# Patient Record
Sex: MALE | Race: WHITE | NOT HISPANIC OR LATINO | Employment: PART TIME | URBAN - NONMETROPOLITAN AREA
[De-identification: names, ages, dates, MRNs, and addresses within clinical notes are randomized per-mention and may not be internally consistent; named-entity substitution may affect disease eponyms.]

---

## 2021-11-19 ENCOUNTER — OFFICE VISIT (OUTPATIENT)
Dept: FAMILY MEDICINE CLINIC | Facility: CLINIC | Age: 46
End: 2021-11-19

## 2021-11-19 VITALS
OXYGEN SATURATION: 98 % | HEIGHT: 72 IN | BODY MASS INDEX: 42.66 KG/M2 | DIASTOLIC BLOOD PRESSURE: 64 MMHG | SYSTOLIC BLOOD PRESSURE: 132 MMHG | HEART RATE: 85 BPM | RESPIRATION RATE: 22 BRPM | WEIGHT: 315 LBS

## 2021-11-19 DIAGNOSIS — E10.9 TYPE 1 DIABETES MELLITUS WITHOUT COMPLICATION (HCC): ICD-10-CM

## 2021-11-19 DIAGNOSIS — I10 PRIMARY HYPERTENSION: ICD-10-CM

## 2021-11-19 DIAGNOSIS — Z76.89 ENCOUNTER TO ESTABLISH CARE: Primary | ICD-10-CM

## 2021-11-19 PROCEDURE — 99203 OFFICE O/P NEW LOW 30 MIN: CPT | Performed by: NURSE PRACTITIONER

## 2021-11-19 RX ORDER — ASCORBIC ACID 500 MG
500 TABLET ORAL DAILY
COMMUNITY

## 2021-11-19 RX ORDER — PROCHLORPERAZINE 25 MG/1
1 SUPPOSITORY RECTAL ONCE AS NEEDED
Qty: 1 EACH | Refills: 0 | Status: SHIPPED | OUTPATIENT
Start: 2021-11-19

## 2021-11-19 RX ORDER — LISINOPRIL AND HYDROCHLOROTHIAZIDE 25; 20 MG/1; MG/1
1 TABLET ORAL DAILY
COMMUNITY
Start: 2021-10-15 | End: 2021-11-19 | Stop reason: SDUPTHER

## 2021-11-19 RX ORDER — INSULIN GLARGINE 100 [IU]/ML
INJECTION, SOLUTION SUBCUTANEOUS
COMMUNITY
End: 2021-11-19

## 2021-11-19 RX ORDER — PROCHLORPERAZINE 25 MG/1
SUPPOSITORY RECTAL
Qty: 3 EACH | Refills: 3 | Status: SHIPPED | OUTPATIENT
Start: 2021-11-19

## 2021-11-19 RX ORDER — LISINOPRIL AND HYDROCHLOROTHIAZIDE 25; 20 MG/1; MG/1
1 TABLET ORAL DAILY
Qty: 30 TABLET | Refills: 11 | Status: SHIPPED | OUTPATIENT
Start: 2021-11-19 | End: 2022-02-02 | Stop reason: SDUPTHER

## 2021-11-19 RX ORDER — PROCHLORPERAZINE 25 MG/1
1 SUPPOSITORY RECTAL
Qty: 1 EACH | Refills: 3 | Status: SHIPPED | OUTPATIENT
Start: 2021-11-19

## 2021-11-19 RX ORDER — ASPIRIN 81 MG/1
81 TABLET, CHEWABLE ORAL DAILY
COMMUNITY

## 2021-11-19 NOTE — PROGRESS NOTES
Chief Complaint  Establish Care    Subjective          Taylor Le presents to [facility] - [place] to establish care. He is a Type 1 diabetic, currently using Lantus two times a day, Novolog with meals. Currently using augustin to help monitor his blood sugar, this has helped him be more compliant with keeping blood sugars stable.           Diabetes  He presents for his initial diabetic visit. He has type 1 diabetes mellitus. His disease course has been fluctuating. Hypoglycemia symptoms include sweats. Pertinent negatives for hypoglycemia include no confusion or dizziness. Associated symptoms include visual change. Pertinent negatives for diabetes include no fatigue, no polyphagia and no polyuria. Hypoglycemia complications include nocturnal hypoglycemia. Symptoms are improving. Risk factors for coronary artery disease include male sex, obesity and family history. He is compliant with treatment most of the time. He is following a generally healthy diet. Eye exam is current.     Outpatient Medications Prior to Visit   Medication Sig Dispense Refill   • ascorbic acid (VITAMIN C) 500 MG tablet Take 500 mg by mouth Daily.     • aspirin 81 MG chewable tablet Chew 81 mg Daily.     • insulin aspart (novoLOG FLEXPEN) 100 UNIT/ML solution pen-injector sc pen INJECT APPROXIMATELY 50 UNITS DAILY BASED ON CARBOHYDRATES AND BLOOD GLUCOSE.     • insulin glargine (LANTUS, SEMGLEE) 100 UNIT/ML injection Inject  under the skin into the appropriate area as directed.     • lisinopril-hydrochlorothiazide (PRINZIDE,ZESTORETIC) 20-25 MG per tablet Take 1 tablet by mouth Daily.       No facility-administered medications prior to visit.       Review of Systems   Constitutional: Negative for activity change, appetite change, diaphoresis, fatigue and unexpected weight change.   HENT: Negative for congestion and ear pain.    Eyes: Negative for discharge and visual disturbance.   Respiratory: Negative  "for apnea, cough, choking, shortness of breath, wheezing and stridor.    Gastrointestinal: Negative for abdominal distention, constipation, diarrhea and nausea.   Endocrine: Negative for cold intolerance, heat intolerance, polyphagia and polyuria.   Genitourinary: Negative.  Negative for frequency.   Musculoskeletal: Negative for arthralgias, back pain and myalgias.   Skin: Negative for color change and wound.   Allergic/Immunologic: Negative.    Neurological: Negative for dizziness, syncope and light-headedness.   Psychiatric/Behavioral: Negative for agitation and confusion.         Objective   Vital Signs:   Visit Vitals  /64 (BP Location: Right arm, Patient Position: Sitting, Cuff Size: Large Adult)   Pulse 85   Resp 22   Ht 182.9 cm (72\")   Wt (!) 144 kg (316 lb 8 oz)   SpO2 98%   BMI 42.93 kg/m²     Physical Exam  Vitals and nursing note reviewed.   Constitutional:       Appearance: Normal appearance. He is well-developed.   HENT:      Head: Normocephalic and atraumatic.      Right Ear: Hearing normal.      Left Ear: Hearing normal.      Nose: Nose normal. No mucosal edema or rhinorrhea.   Eyes:      General: Lids are normal.      Conjunctiva/sclera: Conjunctivae normal.      Pupils: Pupils are equal, round, and reactive to light.   Neck:      Thyroid: No thyroid mass or thyromegaly.      Trachea: Trachea normal. No tracheal tenderness or tracheal deviation.   Cardiovascular:      Rate and Rhythm: Normal rate.      Pulses: Normal pulses.      Heart sounds: Normal heart sounds.   Pulmonary:      Effort: Pulmonary effort is normal. No respiratory distress.      Breath sounds: Normal breath sounds. No stridor. No wheezing or rales.   Abdominal:      Palpations: Abdomen is soft.   Musculoskeletal:         General: Normal range of motion.      Cervical back: Normal range of motion.   Lymphadenopathy:      Head:      Right side of head: No submental, submandibular or tonsillar adenopathy.      Left side of " head: No submental, submandibular or tonsillar adenopathy.      Cervical: No cervical adenopathy.   Skin:     General: Skin is warm and dry.   Neurological:      Mental Status: He is alert and oriented to person, place, and time.   Psychiatric:         Mood and Affect: Mood is not anxious. Affect is not inappropriate.         Speech: Speech normal.         Behavior: Behavior normal. Behavior is not agitated or hyperactive.         Thought Content: Thought content normal.         Judgment: Judgment normal.        Result Review :                 Assessment and Plan    Diagnoses and all orders for this visit:    1. Encounter to establish care (Primary)  -     TSH; Future  -     Hemoglobin A1c; Future  -     Comprehensive Metabolic Panel; Future  -     CBC & Differential; Future  -     Vitamin B12; Future  -     Lipid Panel; Future    2. Type 1 diabetes mellitus without complication (HCC)  -     Continuous Blood Gluc  (Dexcom G6 ) device; 1 each 1 (One) Time As Needed (one device a year) for up to 1 dose.  Dispense: 1 each; Refill: 0  -     Continuous Blood Gluc Transmit (Dexcom G6 Transmitter) misc; 1 each Every 3 (Three) Months.  Dispense: 1 each; Refill: 3  -     Continuous Blood Gluc Sensor (Dexcom G6 Sensor); Every 10 (Ten) Days.  Dispense: 3 each; Refill: 3  -     TSH; Future  -     Hemoglobin A1c; Future  -     Comprehensive Metabolic Panel; Future  -     CBC & Differential; Future  -     Vitamin B12; Future  -     Lipid Panel; Future  -     Insulin Glargine (LANTUS SOLOSTAR) 100 UNIT/ML injection pen; Inject 30 Units under the skin into the appropriate area as directed 2 (Two) Times a Day.  Dispense: 10 pen; Refill: 11  -     insulin aspart (novoLOG FLEXPEN) 100 UNIT/ML solution pen-injector sc pen; Give 20 units with each meal and as needed  Dispense: 5 pen; Refill: 11    3. Primary hypertension  -     lisinopril-hydrochlorothiazide (PRINZIDE,ZESTORETIC) 20-25 MG per tablet; Take 1 tablet by  mouth Daily.  Dispense: 30 tablet; Refill: 11      Continue current medications       He is currently taking Lantus 38 units in AM, and PM taking 28 units. He has been waking with low blood sugar, we will cut 2 units on his PM dose.     Novolog- 1unit per 10carb      Continue to monitor blood pressure at home      Complete ordered lab work   We will call with results       I spent 30 minutes caring for Taylor on this date of service. This time includes time spent by me in the following activities:preparing for the visit, performing a medically appropriate examination and/or evaluation , counseling and educating the patient/family/caregiver, ordering medications, tests, or procedures and documenting information in the medical record  Follow Up   Return in about 3 months (around 2/19/2022), or if symptoms worsen or fail to improve, for Recheck.  Patient was given instructions and counseling regarding his condition or for health maintenance advice. Please see specific information pulled into the AVS if appropriate.           This document has been electronically signed by TRISHA Parra on November 19, 2021 15:16 CST

## 2021-12-03 ENCOUNTER — TELEPHONE (OUTPATIENT)
Dept: FAMILY MEDICINE CLINIC | Facility: CLINIC | Age: 46
End: 2021-12-03

## 2021-12-03 NOTE — TELEPHONE ENCOUNTER
Taylor called and said he and his wife spoke to Rachel about writing a letter stating they are medically fit to adopt.  He wanted an update at [phone]

## 2021-12-22 ENCOUNTER — LAB (OUTPATIENT)
Dept: LAB | Facility: HOSPITAL | Age: 46
End: 2021-12-22

## 2021-12-22 DIAGNOSIS — E10.9 TYPE 1 DIABETES MELLITUS WITHOUT COMPLICATION (HCC): Primary | ICD-10-CM

## 2021-12-22 DIAGNOSIS — Z76.89 ENCOUNTER TO ESTABLISH CARE: ICD-10-CM

## 2021-12-22 DIAGNOSIS — E10.9 TYPE 1 DIABETES MELLITUS WITHOUT COMPLICATION (HCC): ICD-10-CM

## 2021-12-22 PROCEDURE — 83036 HEMOGLOBIN GLYCOSYLATED A1C: CPT

## 2021-12-22 PROCEDURE — 36415 COLL VENOUS BLD VENIPUNCTURE: CPT

## 2021-12-22 PROCEDURE — 82607 VITAMIN B-12: CPT

## 2021-12-22 PROCEDURE — 80053 COMPREHEN METABOLIC PANEL: CPT

## 2021-12-22 PROCEDURE — 80061 LIPID PANEL: CPT

## 2021-12-22 PROCEDURE — 85025 COMPLETE CBC W/AUTO DIFF WBC: CPT

## 2021-12-22 PROCEDURE — 84443 ASSAY THYROID STIM HORMONE: CPT

## 2021-12-22 RX ORDER — FLASH GLUCOSE SCANNING READER
1 EACH MISCELLANEOUS AS NEEDED
Qty: 1 EACH | Refills: 0 | Status: SHIPPED | OUTPATIENT
Start: 2021-12-22

## 2021-12-22 RX ORDER — FLASH GLUCOSE SENSOR
1 KIT MISCELLANEOUS
Qty: 3 EACH | Refills: 11 | Status: SHIPPED | OUTPATIENT
Start: 2021-12-22 | End: 2022-01-03 | Stop reason: SDUPTHER

## 2021-12-23 ENCOUNTER — TELEPHONE (OUTPATIENT)
Dept: FAMILY MEDICINE CLINIC | Facility: CLINIC | Age: 46
End: 2021-12-23

## 2021-12-23 LAB
ALBUMIN SERPL-MCNC: 4.1 G/DL (ref 3.5–5.2)
ALBUMIN/GLOB SERPL: 1.5 G/DL
ALP SERPL-CCNC: 91 U/L (ref 39–117)
ALT SERPL W P-5'-P-CCNC: 27 U/L (ref 1–41)
ANION GAP SERPL CALCULATED.3IONS-SCNC: 7.5 MMOL/L (ref 5–15)
AST SERPL-CCNC: 18 U/L (ref 1–40)
BASOPHILS # BLD AUTO: 0.08 10*3/MM3 (ref 0–0.2)
BASOPHILS NFR BLD AUTO: 1.1 % (ref 0–1.5)
BILIRUB SERPL-MCNC: 0.3 MG/DL (ref 0–1.2)
BUN SERPL-MCNC: 17 MG/DL (ref 6–20)
BUN/CREAT SERPL: 18.7 (ref 7–25)
CALCIUM SPEC-SCNC: 9.3 MG/DL (ref 8.6–10.5)
CHLORIDE SERPL-SCNC: 98 MMOL/L (ref 98–107)
CHOLEST SERPL-MCNC: 178 MG/DL (ref 0–200)
CO2 SERPL-SCNC: 29.5 MMOL/L (ref 22–29)
CREAT SERPL-MCNC: 0.91 MG/DL (ref 0.76–1.27)
DEPRECATED RDW RBC AUTO: 39.8 FL (ref 37–54)
EOSINOPHIL # BLD AUTO: 0.13 10*3/MM3 (ref 0–0.4)
EOSINOPHIL NFR BLD AUTO: 1.8 % (ref 0.3–6.2)
ERYTHROCYTE [DISTWIDTH] IN BLOOD BY AUTOMATED COUNT: 13 % (ref 12.3–15.4)
GFR SERPL CREATININE-BSD FRML MDRD: 91 ML/MIN/1.73
GLOBULIN UR ELPH-MCNC: 2.7 GM/DL
GLUCOSE SERPL-MCNC: 203 MG/DL (ref 65–99)
HBA1C MFR BLD: 9.75 % (ref 4.8–5.6)
HCT VFR BLD AUTO: 40 % (ref 37.5–51)
HDLC SERPL-MCNC: 41 MG/DL (ref 40–60)
HGB BLD-MCNC: 13.5 G/DL (ref 13–17.7)
IMM GRANULOCYTES # BLD AUTO: 0.02 10*3/MM3 (ref 0–0.05)
IMM GRANULOCYTES NFR BLD AUTO: 0.3 % (ref 0–0.5)
LDLC SERPL CALC-MCNC: 125 MG/DL (ref 0–100)
LDLC/HDLC SERPL: 3.03 {RATIO}
LYMPHOCYTES # BLD AUTO: 2.56 10*3/MM3 (ref 0.7–3.1)
LYMPHOCYTES NFR BLD AUTO: 35 % (ref 19.6–45.3)
MCH RBC QN AUTO: 28.4 PG (ref 26.6–33)
MCHC RBC AUTO-ENTMCNC: 33.8 G/DL (ref 31.5–35.7)
MCV RBC AUTO: 84.2 FL (ref 79–97)
MONOCYTES # BLD AUTO: 0.52 10*3/MM3 (ref 0.1–0.9)
MONOCYTES NFR BLD AUTO: 7.1 % (ref 5–12)
NEUTROPHILS NFR BLD AUTO: 4 10*3/MM3 (ref 1.7–7)
NEUTROPHILS NFR BLD AUTO: 54.7 % (ref 42.7–76)
NRBC BLD AUTO-RTO: 0.1 /100 WBC (ref 0–0.2)
PLATELET # BLD AUTO: 315 10*3/MM3 (ref 140–450)
PMV BLD AUTO: 10.5 FL (ref 6–12)
POTASSIUM SERPL-SCNC: 4.7 MMOL/L (ref 3.5–5.2)
PROT SERPL-MCNC: 6.8 G/DL (ref 6–8.5)
RBC # BLD AUTO: 4.75 10*6/MM3 (ref 4.14–5.8)
SODIUM SERPL-SCNC: 135 MMOL/L (ref 136–145)
TRIGL SERPL-MCNC: 63 MG/DL (ref 0–150)
TSH SERPL DL<=0.05 MIU/L-ACNC: 3.36 UIU/ML (ref 0.27–4.2)
VIT B12 BLD-MCNC: 545 PG/ML (ref 211–946)
VLDLC SERPL-MCNC: 12 MG/DL (ref 5–40)
WBC NRBC COR # BLD: 7.31 10*3/MM3 (ref 3.4–10.8)

## 2021-12-23 NOTE — TELEPHONE ENCOUNTER
Patient is requesting the prescription for the sensors to be refax to [facility]. [facility] saying that [facility] didn't receive the rx order.

## 2022-01-03 DIAGNOSIS — E10.9 TYPE 1 DIABETES MELLITUS WITHOUT COMPLICATION: Primary | ICD-10-CM

## 2022-01-03 RX ORDER — FLASH GLUCOSE SENSOR
1 KIT MISCELLANEOUS
Qty: 3 EACH | Refills: 11 | Status: SHIPPED | OUTPATIENT
Start: 2022-01-03

## 2022-01-11 DIAGNOSIS — E10.65 TYPE 1 DIABETES MELLITUS WITH HYPERGLYCEMIA: ICD-10-CM

## 2022-01-11 DIAGNOSIS — E10.9 TYPE 1 DIABETES MELLITUS WITHOUT COMPLICATION: Primary | ICD-10-CM

## 2022-02-02 DIAGNOSIS — I10 PRIMARY HYPERTENSION: ICD-10-CM

## 2022-02-02 RX ORDER — LISINOPRIL AND HYDROCHLOROTHIAZIDE 25; 20 MG/1; MG/1
1 TABLET ORAL DAILY
Qty: 30 TABLET | Refills: 11 | Status: SHIPPED | OUTPATIENT
Start: 2022-02-02 | End: 2023-01-30 | Stop reason: SDUPTHER

## 2022-03-21 ENCOUNTER — OFFICE VISIT (OUTPATIENT)
Dept: FAMILY MEDICINE CLINIC | Facility: CLINIC | Age: 47
End: 2022-03-21

## 2022-03-21 VITALS
HEIGHT: 72 IN | OXYGEN SATURATION: 99 % | WEIGHT: 315 LBS | SYSTOLIC BLOOD PRESSURE: 144 MMHG | DIASTOLIC BLOOD PRESSURE: 80 MMHG | BODY MASS INDEX: 42.66 KG/M2 | RESPIRATION RATE: 24 BRPM | HEART RATE: 67 BPM

## 2022-03-21 DIAGNOSIS — R60.9 EDEMA, UNSPECIFIED TYPE: Primary | ICD-10-CM

## 2022-03-21 PROCEDURE — 99213 OFFICE O/P EST LOW 20 MIN: CPT | Performed by: NURSE PRACTITIONER

## 2022-03-21 RX ORDER — FUROSEMIDE 20 MG/1
20 TABLET ORAL 2 TIMES DAILY
Qty: 60 TABLET | Refills: 3 | Status: SHIPPED | OUTPATIENT
Start: 2022-03-21 | End: 2022-08-30 | Stop reason: SDUPTHER

## 2022-03-21 NOTE — PROGRESS NOTES
Chief Complaint  Hypertension    Subjective          Taylor Le presents to [facility] [place] for follow up. Swelling in both legs, typically always has some degree of swelling, but in the past few days it has been worse. He has compression socks, but does not wear them routinely.         Hypertension  Pertinent negatives include no shortness of breath.   Leg Swelling  This is a chronic problem. The current episode started more than 1 month ago. The problem occurs constantly. The problem has been waxing and waning. Pertinent negatives include no arthralgias, congestion, coughing, diaphoresis, fatigue, myalgias or nausea. The symptoms are aggravated by standing. The treatment provided no relief.     Outpatient Medications Prior to Visit   Medication Sig Dispense Refill   • ascorbic acid (VITAMIN C) 500 MG tablet Take 500 mg by mouth Daily.     • aspirin 81 MG chewable tablet Chew 81 mg Daily.     • Continuous Blood Gluc  (Dexcom G6 ) device 1 each 1 (One) Time As Needed (one device a year) for up to 1 dose. 1 each 0   • Continuous Blood Gluc  (FreeStyle Sander 14 Day [place]) device 1 Device As Needed (Check blood sugar 4 times a day and  PRN). 1 each 0   • Continuous Blood Gluc Sensor (Dexcom G6 Sensor) Every 10 (Ten) Days. 3 each 3   • Continuous Blood Gluc Sensor (FreeStyle Sander 14 Day Sensor) misc 1 each Every 14 (Fourteen) Days. 3 each 11   • Continuous Blood Gluc Transmit (Dexcom G6 Transmitter) misc 1 each Every 3 (Three) Months. 1 each 3   • insulin aspart (novoLOG FLEXPEN) 100 UNIT/ML solution pen-injector sc pen Give 20 units with each meal and as needed 5 pen 11   • Insulin Glargine (LANTUS SOLOSTAR) 100 UNIT/ML injection pen Inject 30 Units under the skin into the appropriate area as directed 2 (Two) Times a Day. 10 pen 11   • lisinopril-hydrochlorothiazide (PRINZIDE,ZESTORETIC) 20-25 MG per tablet Take 1 tablet by mouth Daily. 30  "tablet 11     No facility-administered medications prior to visit.       Review of Systems   Constitutional: Negative for activity change, appetite change, diaphoresis, fatigue and unexpected weight change.   HENT: Negative for congestion and ear pain.    Eyes: Negative for discharge and visual disturbance.   Respiratory: Negative for apnea, cough, choking, shortness of breath, wheezing and stridor.    Cardiovascular: Positive for leg swelling.   Gastrointestinal: Negative for abdominal distention, constipation, diarrhea and nausea.   Endocrine: Negative for cold intolerance, heat intolerance, polyphagia and polyuria.   Genitourinary: Negative.  Negative for frequency.   Musculoskeletal: Negative for arthralgias, back pain and myalgias.   Skin: Negative for color change and wound.   Allergic/Immunologic: Negative.    Neurological: Negative for dizziness, syncope and light-headedness.   Psychiatric/Behavioral: Negative for agitation and confusion. The patient is not nervous/anxious.          Objective   Vital Signs:   Visit Vitals  /80 (BP Location: Left arm, Patient Position: Sitting, Cuff Size: Large Adult)   Pulse 67   Resp 24   Ht 182.9 cm (72\")   Wt (!) 143 kg (316 lb 4.8 oz)   SpO2 99%   BMI 42.90 kg/m²     Physical Exam  Vitals and nursing note reviewed.   Constitutional:       Appearance: Normal appearance. He is well-developed.   HENT:      Head: Normocephalic and atraumatic.      Right Ear: Hearing normal.      Left Ear: Hearing normal.      Nose: Nose normal. No mucosal edema or rhinorrhea.   Eyes:      General: Lids are normal.      Conjunctiva/sclera: Conjunctivae normal.      Pupils: Pupils are equal, round, and reactive to light.   Neck:      Thyroid: No thyroid mass or thyromegaly.      Trachea: Trachea normal. No tracheal tenderness or tracheal deviation.   Cardiovascular:      Rate and Rhythm: Normal rate.      Pulses: Normal pulses.      Heart sounds: Normal heart sounds.   Pulmonary:      " Effort: Pulmonary effort is normal. No respiratory distress.      Breath sounds: Normal breath sounds. No stridor. No wheezing or rales.   Abdominal:      Palpations: Abdomen is soft.   Musculoskeletal:         General: Normal range of motion.      Cervical back: Normal range of motion.      Right lower leg: Edema present.      Left lower leg: Edema present.        Legs:    Lymphadenopathy:      Head:      Right side of head: No submental, submandibular or tonsillar adenopathy.      Left side of head: No submental, submandibular or tonsillar adenopathy.      Cervical: No cervical adenopathy.   Skin:     General: Skin is warm and dry.   Neurological:      Mental Status: He is alert and oriented to person, place, and time.   Psychiatric:         Mood and Affect: Mood is not anxious. Affect is not inappropriate.         Speech: Speech normal.         Behavior: Behavior normal. Behavior is not agitated or hyperactive.         Thought Content: Thought content normal.         Judgment: Judgment normal.        Result Review :                 Assessment and Plan    Diagnoses and all orders for this visit:    1. Edema, unspecified type (Primary)  -     furosemide (Lasix) 20 MG tablet; Take 1 tablet by mouth 2 (Two) Times a Day.  Dispense: 60 tablet; Refill: 3  -     Comprehensive Metabolic Panel; Future      We will start Lasix  If swelling worsens please call the office    Complete ordered labs   We will call with results  Increase dietary potassium at this time   We discussed possibly adding potassium supplement             I spent 25 minutes caring for Taylor on this date of service. This time includes time spent by me in the following activities:preparing for the visit, reviewing tests, performing a medically appropriate examination and/or evaluation , counseling and educating the patient/family/caregiver, ordering medications, tests, or procedures and documenting information in the medical record  Follow Up   Return in  about 2 weeks (around 4/4/2022), or if symptoms worsen or fail to improve, for Recheck.  Patient was given instructions and counseling regarding his condition or for health maintenance advice. Please see specific information pulled into the AVS if appropriate.           This document has been electronically signed by TRISHA Parra on March 22, 2022 08:34 CDT

## 2022-04-12 ENCOUNTER — LAB (OUTPATIENT)
Dept: LAB | Facility: HOSPITAL | Age: 47
End: 2022-04-12

## 2022-04-12 DIAGNOSIS — R60.9 EDEMA, UNSPECIFIED TYPE: ICD-10-CM

## 2022-04-12 PROCEDURE — 36415 COLL VENOUS BLD VENIPUNCTURE: CPT

## 2022-04-12 PROCEDURE — 80053 COMPREHEN METABOLIC PANEL: CPT

## 2022-04-13 ENCOUNTER — OFFICE VISIT (OUTPATIENT)
Dept: FAMILY MEDICINE CLINIC | Facility: CLINIC | Age: 47
End: 2022-04-13

## 2022-04-13 VITALS
DIASTOLIC BLOOD PRESSURE: 62 MMHG | OXYGEN SATURATION: 99 % | BODY MASS INDEX: 42.19 KG/M2 | SYSTOLIC BLOOD PRESSURE: 118 MMHG | HEIGHT: 72 IN | HEART RATE: 71 BPM | RESPIRATION RATE: 24 BRPM | WEIGHT: 311.5 LBS

## 2022-04-13 DIAGNOSIS — R60.9 EDEMA, UNSPECIFIED TYPE: Primary | ICD-10-CM

## 2022-04-13 LAB
ALBUMIN SERPL-MCNC: 4.2 G/DL (ref 3.5–5.2)
ALBUMIN/GLOB SERPL: 1.4 G/DL
ALP SERPL-CCNC: 87 U/L (ref 39–117)
ALT SERPL W P-5'-P-CCNC: 25 U/L (ref 1–41)
ANION GAP SERPL CALCULATED.3IONS-SCNC: 13.1 MMOL/L (ref 5–15)
AST SERPL-CCNC: 17 U/L (ref 1–40)
BILIRUB SERPL-MCNC: 0.3 MG/DL (ref 0–1.2)
BUN SERPL-MCNC: 19 MG/DL (ref 6–20)
BUN/CREAT SERPL: 17.9 (ref 7–25)
CALCIUM SPEC-SCNC: 8.9 MG/DL (ref 8.6–10.5)
CHLORIDE SERPL-SCNC: 93 MMOL/L (ref 98–107)
CO2 SERPL-SCNC: 25.9 MMOL/L (ref 22–29)
CREAT SERPL-MCNC: 1.06 MG/DL (ref 0.76–1.27)
EGFRCR SERPLBLD CKD-EPI 2021: 88.7 ML/MIN/1.73
GLOBULIN UR ELPH-MCNC: 2.9 GM/DL
GLUCOSE SERPL-MCNC: 330 MG/DL (ref 65–99)
POTASSIUM SERPL-SCNC: 4.7 MMOL/L (ref 3.5–5.2)
PROT SERPL-MCNC: 7.1 G/DL (ref 6–8.5)
SODIUM SERPL-SCNC: 132 MMOL/L (ref 136–145)

## 2022-04-13 PROCEDURE — 99213 OFFICE O/P EST LOW 20 MIN: CPT | Performed by: NURSE PRACTITIONER

## 2022-04-13 RX ORDER — FUROSEMIDE 40 MG/1
40 TABLET ORAL 2 TIMES DAILY
Qty: 10 TABLET | Refills: 0 | Status: SHIPPED | OUTPATIENT
Start: 2022-04-13 | End: 2022-04-20

## 2022-04-13 NOTE — PROGRESS NOTES
Chief Complaint  Edema    Subjective          Taylor Le presents to [facility] - [place] for follow-up regarding swelling.  Swelling has been improved however recently went on a long car ride and now swelling has returned.  Leg Swelling  This is a chronic problem. The current episode started more than 1 month ago. The problem occurs constantly. The problem has been waxing and waning. Pertinent negatives include no arthralgias, congestion, coughing, diaphoresis, fatigue, myalgias or nausea. The symptoms are aggravated by standing. The treatment provided no relief.     Outpatient Medications Prior to Visit   Medication Sig Dispense Refill   • ascorbic acid (VITAMIN C) 500 MG tablet Take 500 mg by mouth Daily.     • aspirin 81 MG chewable tablet Chew 81 mg Daily.     • Continuous Blood Gluc  (Dexcom G6 ) device 1 each 1 (One) Time As Needed (one device a year) for up to 1 dose. 1 each 0   • Continuous Blood Gluc  (FreeStyle Sander 14 Day [place]) device 1 Device As Needed (Check blood sugar 4 times a day and  PRN). 1 each 0   • Continuous Blood Gluc Sensor (Dexcom G6 Sensor) Every 10 (Ten) Days. 3 each 3   • Continuous Blood Gluc Sensor (FreeStyle Sander 14 Day Sensor) misc 1 each Every 14 (Fourteen) Days. 3 each 11   • Continuous Blood Gluc Transmit (Dexcom G6 Transmitter) misc 1 each Every 3 (Three) Months. 1 each 3   • furosemide (Lasix) 20 MG tablet Take 1 tablet by mouth 2 (Two) Times a Day. 60 tablet 3   • insulin aspart (novoLOG FLEXPEN) 100 UNIT/ML solution pen-injector sc pen Give 20 units with each meal and as needed 5 pen 11   • Insulin Glargine (LANTUS SOLOSTAR) 100 UNIT/ML injection pen Inject 30 Units under the skin into the appropriate area as directed 2 (Two) Times a Day. 10 pen 11   • lisinopril-hydrochlorothiazide (PRINZIDE,ZESTORETIC) 20-25 MG per tablet Take 1 tablet by mouth Daily. 30 tablet 11     No facility-administered  "medications prior to visit.       Review of Systems   Constitutional: Negative for diaphoresis and fatigue.   HENT: Negative for congestion.    Respiratory: Negative for cough.    Gastrointestinal: Negative for nausea.   Musculoskeletal: Negative for arthralgias and myalgias.         Objective   Vital Signs:   Visit Vitals  /62 (BP Location: Right arm, Patient Position: Sitting, Cuff Size: Large Adult)   Pulse 71   Resp 24   Ht 182.9 cm (72\")   Wt (!) 141 kg (311 lb 8 oz)   SpO2 99%   BMI 42.25 kg/m²     Physical Exam  Vitals and nursing note reviewed.   Constitutional:       Appearance: Normal appearance. He is well-developed.   HENT:      Head: Normocephalic and atraumatic.      Right Ear: Hearing normal.      Left Ear: Hearing normal.      Nose: Nose normal. No mucosal edema or rhinorrhea.   Eyes:      General: Lids are normal.      Conjunctiva/sclera: Conjunctivae normal.      Pupils: Pupils are equal, round, and reactive to light.   Neck:      Thyroid: No thyroid mass or thyromegaly.      Trachea: Trachea normal. No tracheal tenderness or tracheal deviation.   Cardiovascular:      Rate and Rhythm: Normal rate.      Pulses: Normal pulses.      Heart sounds: Normal heart sounds.   Pulmonary:      Effort: Pulmonary effort is normal. No respiratory distress.      Breath sounds: Normal breath sounds. No stridor. No wheezing or rales.   Abdominal:      Palpations: Abdomen is soft.   Musculoskeletal:         General: Normal range of motion.      Cervical back: Normal range of motion.      Right lower leg: Edema present.      Left lower leg: Edema present.        Legs:    Lymphadenopathy:      Head:      Right side of head: No submental, submandibular or tonsillar adenopathy.      Left side of head: No submental, submandibular or tonsillar adenopathy.      Cervical: No cervical adenopathy.   Skin:     General: Skin is warm and dry.   Neurological:      Mental Status: He is alert and oriented to person, place, and " time.   Psychiatric:         Mood and Affect: Mood is not anxious. Affect is not inappropriate.         Speech: Speech normal.         Behavior: Behavior normal. Behavior is not agitated or hyperactive.         Thought Content: Thought content normal.         Judgment: Judgment normal.        Result Review :                 Assessment and Plan    Diagnoses and all orders for this visit:    1. Edema, unspecified type (Primary)  -     furosemide (Lasix) 40 MG tablet; Take 1 tablet by mouth 2 (Two) Times a Day.  Dispense: 10 tablet; Refill: 0        We will increase Lasix to 40 mg twice daily for 5 days  Fact that we will go back to regular dose    Please call the office if you have any issues or worsening of edema.    Continue to elevate legs  Avoid long car rides    Follow Up   Return if symptoms worsen or fail to improve, for Next scheduled follow up.  Patient was given instructions and counseling regarding his condition or for health maintenance advice. Please see specific information pulled into the AVS if appropriate.           This document has been electronically signed by TRISHA Parra on April 18, 2022 15:44 CDT

## 2022-04-14 ENCOUNTER — TELEPHONE (OUTPATIENT)
Dept: FAMILY MEDICINE CLINIC | Facility: CLINIC | Age: 47
End: 2022-04-14

## 2022-04-14 ENCOUNTER — TRANSCRIBE ORDERS (OUTPATIENT)
Dept: LAB | Facility: HOSPITAL | Age: 47
End: 2022-04-14

## 2022-04-14 ENCOUNTER — LAB (OUTPATIENT)
Dept: LAB | Facility: HOSPITAL | Age: 47
End: 2022-04-14

## 2022-04-14 DIAGNOSIS — Z11.4 SCREENING FOR HUMAN IMMUNODEFICIENCY VIRUS: ICD-10-CM

## 2022-04-14 DIAGNOSIS — Z11.3 SCREENING EXAMINATION FOR VENEREAL DISEASE: Primary | ICD-10-CM

## 2022-04-14 DIAGNOSIS — Z11.3 SCREENING EXAMINATION FOR VENEREAL DISEASE: ICD-10-CM

## 2022-04-14 LAB
HBV SURFACE AG SERPL QL IA: NORMAL
HCV AB SER DONR QL: NORMAL
HIV1+2 AB SER QL: NORMAL

## 2022-04-14 PROCEDURE — 87340 HEPATITIS B SURFACE AG IA: CPT

## 2022-04-14 PROCEDURE — G0432 EIA HIV-1/HIV-2 SCREEN: HCPCS

## 2022-04-14 PROCEDURE — 86803 HEPATITIS C AB TEST: CPT

## 2022-04-14 PROCEDURE — 86780 TREPONEMA PALLIDUM: CPT

## 2022-04-14 PROCEDURE — 36415 COLL VENOUS BLD VENIPUNCTURE: CPT

## 2022-04-15 LAB — TREPONEMA PALLIDUM IGG+IGM AB [PRESENCE] IN SERUM OR PLASMA BY IMMUNOASSAY: NON REACTIVE

## 2022-04-20 ENCOUNTER — TELEMEDICINE (OUTPATIENT)
Dept: ENDOCRINOLOGY | Facility: CLINIC | Age: 47
End: 2022-04-20

## 2022-04-20 DIAGNOSIS — I10 PRIMARY HYPERTENSION: ICD-10-CM

## 2022-04-20 DIAGNOSIS — E78.2 MIXED DIABETIC HYPERLIPIDEMIA ASSOCIATED WITH TYPE 1 DIABETES MELLITUS: ICD-10-CM

## 2022-04-20 DIAGNOSIS — E10.65 TYPE 1 DIABETES MELLITUS WITH HYPERGLYCEMIA: Primary | ICD-10-CM

## 2022-04-20 DIAGNOSIS — E10.69 MIXED DIABETIC HYPERLIPIDEMIA ASSOCIATED WITH TYPE 1 DIABETES MELLITUS: ICD-10-CM

## 2022-04-20 PROCEDURE — 99204 OFFICE O/P NEW MOD 45 MIN: CPT | Performed by: INTERNAL MEDICINE

## 2022-04-20 PROCEDURE — 95251 CONT GLUC MNTR ANALYSIS I&R: CPT | Performed by: INTERNAL MEDICINE

## 2022-04-20 RX ORDER — GLUCAGON INJECTION, SOLUTION 1 MG/.2ML
1 INJECTION, SOLUTION SUBCUTANEOUS AS NEEDED
Qty: 0.4 ML | Refills: 1 | Status: SHIPPED | OUTPATIENT
Start: 2022-04-20

## 2022-04-20 RX ORDER — ROSUVASTATIN CALCIUM 10 MG/1
10 TABLET, COATED ORAL NIGHTLY
Qty: 30 TABLET | Refills: 11 | Status: SHIPPED | OUTPATIENT
Start: 2022-04-20 | End: 2023-04-20

## 2022-04-20 NOTE — PROGRESS NOTES
CC, Type 1 DM                                      This was a Telehealth Encounter. Benefits and Disadvantages of a Telehealth Visit were discussed and accepted by patient. .  Patient agreed to receive service through Telehealth visit as patient is being compliant with social distancing recommendations imparted by CDC.     You have chosen to receive care through a telehealth visit.  Do you consent to use a video/audio connection for your medical care today? Yes      History of Present Illness    46 y.o. male     Diabetes Type 1    Duration - since age 15 years old    Complications -    Neuropathy   Retinopathy , cataracts, vision loss    Present Monitoring -      Fingersticks -     CGM -     Present Regimen -  Basal, bolus insulin    Carb Intake -   Counts carbs     Exercise -   None    Symptoms -     Numbness, tingling   ==========================================  PE    There were no vitals taken for this visit.  AOx3  No visible goiter  Normal Respiratory Effort   No Edema    Labs    Lab Results   Component Value Date    WBC 7.31 12/22/2021    HGB 13.5 12/22/2021    HCT 40.0 12/22/2021    MCV 84.2 12/22/2021     12/22/2021     Lab Results   Component Value Date    GLUCOSE 330 (H) 04/12/2022    BUN 19 04/12/2022    CREATININE 1.06 04/12/2022    EGFRIFNONA 91 12/22/2021    BCR 17.9 04/12/2022     (L) 04/12/2022    K 4.7 04/12/2022    CO2 25.9 04/12/2022    CALCIUM 8.9 04/12/2022    ALBUMIN 4.20 04/12/2022    LABIL2 1.6 07/19/2021    AST 17 04/12/2022    ALT 25 04/12/2022                 ==========================================      ICD-10-CM ICD-9-CM   1. Type 1 diabetes mellitus with hyperglycemia (HCC)  E10.65 250.01   2. Mixed diabetic hyperlipidemia associated with type 1 diabetes mellitus (HCC)  E10.69 250.81    E78.2 272.2   3. Primary hypertension  I10 401.9       Diabetes  [place]    --------------------------------------------------------------------------------------------------------------------------------------------    Glycemic Management   Lab Results   Component Value Date    HGBA1C 9.75 (H) 12/22/2021       CGM 2 week review , augustin 2   Type 1 dm   w hypoglycemia      Lantus   38 am and 28 ---  Change to 30 units bid   And self adjust by 10 % changes    ---    Novolog   10 grams - 1 units, change to 1 to 8 , predict 1 :5    30 - 1 unit    Target 100          Glucagon  rx      Criteria for Approval of CGM and/or Insulin Pump     The patient has diabetes mellitus, insulin-dependent.    Our Diabetes Department has evaluated the patient in the last six months and will continue counseling on insulin adjustment.     The patient performs blood glucose testing at least four times daily with proven glucose variability from 50 to 300 mg per dl.    The patient is administering basal insulin and prandial insulin four times per day for more than six months.    The patient uses a home blood glucose monitor to assess blood glucose at least four times daily for more than six months.    The patient requires frequent adjustment of insulin treatment regimen based on blood glucose readings.    The patient has frequent variability in blood glucose readings due to activity and variability in meal content and time.     The patient has completed a diabetes education program with us.    The patient has demonstrated the ability to self-monitor her glucose.     The patient is motivated in improving diabetes control     ==========================================================================  Microvascular Complication Monitoring    Neuropathy                       Yes, sensorial , not painful   Retinopathy    yes   Renal     GFR   Lab Results   Component Value Date    EGFR 88.7 04/12/2022       Albuminuria   No results found for: ROBERT          ==========================================================================    Lipids  Lab Results   Component Value Date    CHOL 178 12/22/2021    CHLPL 199 05/15/2020    TRIG 63 12/22/2021    HDL 41 12/22/2021     (H) 12/22/2021       Statin  crestor 10 mg qhs     ==========================================================================    HTN  There were no vitals taken for this visit.    Lisinopril - hctz   Lasix       ==========================================================================    Bone Health    Vit D    No results found for: [id number]    Calcium intake     ==========================================================================    Thyroid Assessment  Lab Results   Component Value Date    TSH 3.360 12/22/2021           ==========================================================================    Vitamin B12  Lab Results   Component Value Date    OEJNYRLR10 545 12/22/2021         ==========================================================================    Celiac Panel     Pending         No orders of the defined types were placed in this encounter.               This document has been electronically signed by Bernabe Juarez MD on April 20, 2022 08:26 CDT

## 2022-04-28 ENCOUNTER — DOCUMENTATION (OUTPATIENT)
Dept: ENDOCRINOLOGY | Facility: CLINIC | Age: 47
End: 2022-04-28

## 2022-04-28 NOTE — PROGRESS NOTES
DARCIE PAPERWORK, CHART NOTES, INS CARDS AND CMN SENT TO [facility] AT [facility] 4/25/22    CONFIRMATION RECEIVED AND SENT TO  4/28/22

## 2022-07-01 ENCOUNTER — TELEPHONE (OUTPATIENT)
Dept: ENDOCRINOLOGY | Facility: CLINIC | Age: 47
End: 2022-07-01

## 2022-07-13 ENCOUNTER — TELEPHONE (OUTPATIENT)
Dept: ENDOCRINOLOGY | Facility: CLINIC | Age: 47
End: 2022-07-13

## 2022-07-20 ENCOUNTER — TELEMEDICINE (OUTPATIENT)
Dept: ENDOCRINOLOGY | Facility: CLINIC | Age: 47
End: 2022-07-20

## 2022-07-20 DIAGNOSIS — E10.649 TYPE 1 DIABETES MELLITUS WITH HYPOGLYCEMIA UNAWARENESS: Primary | ICD-10-CM

## 2022-07-20 DIAGNOSIS — E10.69 MIXED DIABETIC HYPERLIPIDEMIA ASSOCIATED WITH TYPE 1 DIABETES MELLITUS: ICD-10-CM

## 2022-07-20 DIAGNOSIS — E78.2 MIXED DIABETIC HYPERLIPIDEMIA ASSOCIATED WITH TYPE 1 DIABETES MELLITUS: ICD-10-CM

## 2022-07-20 DIAGNOSIS — I10 PRIMARY HYPERTENSION: ICD-10-CM

## 2022-07-20 LAB — HBA1C MFR BLD: 8.2 %

## 2022-07-20 PROCEDURE — 95251 CONT GLUC MNTR ANALYSIS I&R: CPT | Performed by: INTERNAL MEDICINE

## 2022-07-20 PROCEDURE — 99214 OFFICE O/P EST MOD 30 MIN: CPT | Performed by: INTERNAL MEDICINE

## 2022-07-20 NOTE — PROGRESS NOTES
CC, Type 1 DM                                      This was a Telehealth Encounter. Benefits and Disadvantages of a Telehealth Visit were discussed and accepted by patient. .  Patient agreed to receive service through Telehealth visit as patient is being compliant with social distancing recommendations imparted by CDC.     You have chosen to receive care through a telehealth visit.  Do you consent to use a video/audio connection for your medical care today? Yes      History of Present Illness    46 y.o. male     Diabetes Type 1    Duration - since age 15 years old    Complications -    Neuropathy   Retinopathy , cataracts, vision loss    Present Monitoring -      Fingersticks -     CGM -     Present Regimen -  Basal, bolus insulin    Carb Intake -   Counts carbs     Exercise -   None    Symptoms -     Numbness, tingling   ==========================================  PE    There were no vitals taken for this visit.  AOx3  No visible goiter  Normal Respiratory Effort   No Edema    Labs    Lab Results   Component Value Date    WBC 7.31 12/22/2021    HGB 13.5 12/22/2021    HCT 40.0 12/22/2021    MCV 84.2 12/22/2021     12/22/2021     Lab Results   Component Value Date    GLUCOSE 330 (H) 04/12/2022    BUN 19 04/12/2022    CREATININE 1.06 04/12/2022    EGFRIFNONA 91 12/22/2021    BCR 17.9 04/12/2022     (L) 04/12/2022    K 4.7 04/12/2022    CO2 25.9 04/12/2022    CALCIUM 8.9 04/12/2022    ALBUMIN 4.20 04/12/2022    LABIL2 1.6 07/19/2021    AST 17 04/12/2022    ALT 25 04/12/2022                 ==========================================      ICD-10-CM ICD-9-CM   1. Type 1 diabetes mellitus with hypoglycemia unawareness (HCC)  E10.649 250.81   2. Mixed diabetic hyperlipidemia associated with type 1 diabetes mellitus (HCC)  E10.69 250.81    E78.2 272.2   3. Primary hypertension  I10 401.9       Diabetes  Mellitus    --------------------------------------------------------------------------------------------------------------------------------------------    Glycemic Management   Lab Results   Component Value Date    HGBA1C 8.2 07/20/2022       CGM 2 week review , augustin 2   Type 1 dm w hyperglycemia           Lantus   38 am and 28 ---  Change to 30 units bid   Suggest 28 BID   And self adjust by 10 % changes    ---    Novolog   10 grams - 1.5 units per 10 grams  Plus 1       30 - 1 unit    Target 100          Glucagon  rx    Has TSlim   Pending to get dexcom     ==========================================================================  Microvascular Complication Monitoring    Neuropathy                       Yes, sensorial , not painful   Retinopathy    yes   Renal     GFR   Lab Results   Component Value Date    EGFR 88.7 04/12/2022       Albuminuria   No results found for: MALBCRERATIO         ==========================================================================    Lipids  Lab Results   Component Value Date    CHOL 178 12/22/2021    CHLPL 199 05/15/2020    TRIG 63 12/22/2021    HDL 41 12/22/2021     (H) 12/22/2021       Statin  crestor 10 mg qhs     ==========================================================================    HTN  There were no vitals taken for this visit.    Lisinopril - hctz   Lasix       ==========================================================================    Bone Health    Vit D    No results found for: HYTF49PI    Calcium intake     ==========================================================================    Thyroid Assessment  Lab Results   Component Value Date    TSH 3.360 12/22/2021           ==========================================================================    Vitamin B12  Lab Results   Component Value Date    [id number] 545 12/22/2021         ==========================================================================    Celiac Panel     Pending         Orders  Placed This Encounter   Procedures   • Hemoglobin A1c     This order was created through External Result Entry     Order Specific Question:   Release to patient     Answer:   Immediate                This document has been electronically signed by Bernabe Juarez MD on July 20, 2022 16:33 CDT

## 2022-07-21 ENCOUNTER — DOCUMENTATION (OUTPATIENT)
Dept: ENDOCRINOLOGY | Facility: CLINIC | Age: 47
End: 2022-07-21

## 2022-08-23 ENCOUNTER — OFFICE VISIT (OUTPATIENT)
Dept: ENDOCRINOLOGY | Facility: CLINIC | Age: 47
End: 2022-08-23

## 2022-08-23 DIAGNOSIS — E10.65 TYPE 1 DIABETES MELLITUS WITH HYPERGLYCEMIA: ICD-10-CM

## 2022-08-23 PROCEDURE — G0108 DIAB MANAGE TRN  PER INDIV: HCPCS | Performed by: DIETITIAN, REGISTERED

## 2022-08-24 ENCOUNTER — TELEPHONE (OUTPATIENT)
Dept: ENDOCRINOLOGY | Facility: CLINIC | Age: 47
End: 2022-08-24

## 2022-08-24 RX ORDER — INSULIN ASPART 100 [IU]/ML
INJECTION, SOLUTION INTRAVENOUS; SUBCUTANEOUS
Qty: 30 ML | Refills: 6 | Status: SHIPPED | OUTPATIENT
Start: 2022-08-24 | End: 2023-03-23

## 2022-08-24 NOTE — PROGRESS NOTES
Taylor Le is a 46 y.o. male referred for outpatient diabetes education by Dr. Juarez. Patient attended individual education for 30 minutes on 08/23/2022. Topics covered included:     1. Healthy Food Choices   i. Provided patient with detailed carbohydrate counting guide.    ii. Instructed patient to eat 45-60 grams of carbohydrate with each meal (3-4 exchange choices) and 15 grams of carb for snacks (1 exchange choices).   iii. Provided patient with list of non-starchy vegetables and foods that are low in carbohydrate for snacks and to incorporate with meals (Planning Healthy Meals Packet).    iv. Choose fruits, vegetables, whole grains, legumes, low-fat milk, fiber-rich foods, minimal saturated fats, and watch cholesterol and sodium intake.    v. Reviewed carbohydrate-containing foods, standard serving sizes, and measuring foods.   vi. Reviewed the difference between simple and complex carbohydrate. Encouraged patient to choose complex carbohydrates more often.      2. Discussed tips on using insulin pump (ie tucking excess tubing into pants to avoid snagging).      Provided patient with Diabetes and You book, and Planning Healthy Meals book. Provided handout of sample menu with carbs listed.      Thank you Dr. Juarez for this referral.        ISHAN Krishnamurthy, RD, LD

## 2022-08-30 DIAGNOSIS — R60.9 EDEMA, UNSPECIFIED TYPE: ICD-10-CM

## 2022-08-30 RX ORDER — FUROSEMIDE 20 MG/1
20 TABLET ORAL 2 TIMES DAILY
Qty: 60 TABLET | Refills: 3 | Status: SHIPPED | OUTPATIENT
Start: 2022-08-30 | End: 2023-01-09 | Stop reason: SDUPTHER

## 2022-11-02 ENCOUNTER — LAB (OUTPATIENT)
Dept: LAB | Facility: HOSPITAL | Age: 47
End: 2022-11-02

## 2022-11-02 ENCOUNTER — OFFICE VISIT (OUTPATIENT)
Dept: ENDOCRINOLOGY | Facility: CLINIC | Age: 47
End: 2022-11-02

## 2022-11-02 VITALS
BODY MASS INDEX: 42.12 KG/M2 | DIASTOLIC BLOOD PRESSURE: 60 MMHG | HEIGHT: 72 IN | SYSTOLIC BLOOD PRESSURE: 120 MMHG | HEART RATE: 74 BPM | WEIGHT: 311 LBS | OXYGEN SATURATION: 98 %

## 2022-11-02 DIAGNOSIS — E10.69 MIXED DIABETIC HYPERLIPIDEMIA ASSOCIATED WITH TYPE 1 DIABETES MELLITUS: ICD-10-CM

## 2022-11-02 DIAGNOSIS — I89.0 LYMPHEDEMA: ICD-10-CM

## 2022-11-02 DIAGNOSIS — G47.33 OBSTRUCTIVE SLEEP APNEA: ICD-10-CM

## 2022-11-02 DIAGNOSIS — I10 PRIMARY HYPERTENSION: ICD-10-CM

## 2022-11-02 DIAGNOSIS — E78.2 MIXED DIABETIC HYPERLIPIDEMIA ASSOCIATED WITH TYPE 1 DIABETES MELLITUS: ICD-10-CM

## 2022-11-02 DIAGNOSIS — E10.9 WELL CONTROLLED TYPE 1 DIABETES MELLITUS: Primary | ICD-10-CM

## 2022-11-02 LAB
ALBUMIN SERPL-MCNC: 4.4 G/DL (ref 3.5–5.2)
ALBUMIN/GLOB SERPL: 1.2 G/DL
ALP SERPL-CCNC: 108 U/L (ref 39–117)
ALT SERPL W P-5'-P-CCNC: 28 U/L (ref 1–41)
ANION GAP SERPL CALCULATED.3IONS-SCNC: 9 MMOL/L (ref 5–15)
AST SERPL-CCNC: 28 U/L (ref 1–40)
BASOPHILS # BLD AUTO: 0.09 10*3/MM3 (ref 0–0.2)
BASOPHILS NFR BLD AUTO: 1 % (ref 0–1.5)
BILIRUB SERPL-MCNC: 0.3 MG/DL (ref 0–1.2)
BUN SERPL-MCNC: 21 MG/DL (ref 6–20)
BUN/CREAT SERPL: 19.1 (ref 7–25)
CALCIUM SPEC-SCNC: 9.6 MG/DL (ref 8.6–10.5)
CHLORIDE SERPL-SCNC: 99 MMOL/L (ref 98–107)
CHOLEST SERPL-MCNC: 167 MG/DL (ref 0–200)
CO2 SERPL-SCNC: 30 MMOL/L (ref 22–29)
CREAT SERPL-MCNC: 1.1 MG/DL (ref 0.76–1.27)
DEPRECATED RDW RBC AUTO: 42.8 FL (ref 37–54)
EGFRCR SERPLBLD CKD-EPI 2021: 84.4 ML/MIN/1.73
EOSINOPHIL # BLD AUTO: 0.21 10*3/MM3 (ref 0–0.4)
EOSINOPHIL NFR BLD AUTO: 2.3 % (ref 0.3–6.2)
ERYTHROCYTE [DISTWIDTH] IN BLOOD BY AUTOMATED COUNT: 13.7 % (ref 12.3–15.4)
GLOBULIN UR ELPH-MCNC: 3.6 GM/DL
GLUCOSE SERPL-MCNC: 114 MG/DL (ref 65–99)
HBA1C MFR BLD: 6.9 % (ref 4.8–5.6)
HCT VFR BLD AUTO: 41.1 % (ref 37.5–51)
HDLC SERPL-MCNC: 48 MG/DL (ref 40–60)
HGB BLD-MCNC: 13.2 G/DL (ref 13–17.7)
IMM GRANULOCYTES # BLD AUTO: 0.02 10*3/MM3 (ref 0–0.05)
IMM GRANULOCYTES NFR BLD AUTO: 0.2 % (ref 0–0.5)
LDLC SERPL CALC-MCNC: 106 MG/DL (ref 0–100)
LDLC/HDLC SERPL: 2.19 {RATIO}
LYMPHOCYTES # BLD AUTO: 3.06 10*3/MM3 (ref 0.7–3.1)
LYMPHOCYTES NFR BLD AUTO: 33.6 % (ref 19.6–45.3)
MCH RBC QN AUTO: 27.4 PG (ref 26.6–33)
MCHC RBC AUTO-ENTMCNC: 32.1 G/DL (ref 31.5–35.7)
MCV RBC AUTO: 85.4 FL (ref 79–97)
MONOCYTES # BLD AUTO: 0.72 10*3/MM3 (ref 0.1–0.9)
MONOCYTES NFR BLD AUTO: 7.9 % (ref 5–12)
NEUTROPHILS NFR BLD AUTO: 5.01 10*3/MM3 (ref 1.7–7)
NEUTROPHILS NFR BLD AUTO: 55 % (ref 42.7–76)
NRBC BLD AUTO-RTO: 0 /100 WBC (ref 0–0.2)
PLATELET # BLD AUTO: 343 10*3/MM3 (ref 140–450)
PMV BLD AUTO: 9.7 FL (ref 6–12)
POTASSIUM SERPL-SCNC: 4.7 MMOL/L (ref 3.5–5.2)
PROT SERPL-MCNC: 8 G/DL (ref 6–8.5)
RBC # BLD AUTO: 4.81 10*6/MM3 (ref 4.14–5.8)
SODIUM SERPL-SCNC: 138 MMOL/L (ref 136–145)
TRIGL SERPL-MCNC: 70 MG/DL (ref 0–150)
TSH SERPL DL<=0.05 MIU/L-ACNC: 3.45 UIU/ML (ref 0.27–4.2)
VLDLC SERPL-MCNC: 13 MG/DL (ref 5–40)
WBC NRBC COR # BLD: 9.11 10*3/MM3 (ref 3.4–10.8)

## 2022-11-02 PROCEDURE — 85025 COMPLETE CBC W/AUTO DIFF WBC: CPT | Performed by: INTERNAL MEDICINE

## 2022-11-02 PROCEDURE — 99214 OFFICE O/P EST MOD 30 MIN: CPT | Performed by: INTERNAL MEDICINE

## 2022-11-02 PROCEDURE — 82570 ASSAY OF URINE CREATININE: CPT | Performed by: INTERNAL MEDICINE

## 2022-11-02 PROCEDURE — 36415 COLL VENOUS BLD VENIPUNCTURE: CPT | Performed by: INTERNAL MEDICINE

## 2022-11-02 PROCEDURE — 95251 CONT GLUC MNTR ANALYSIS I&R: CPT | Performed by: INTERNAL MEDICINE

## 2022-11-02 PROCEDURE — 82607 VITAMIN B-12: CPT | Performed by: INTERNAL MEDICINE

## 2022-11-02 PROCEDURE — 83036 HEMOGLOBIN GLYCOSYLATED A1C: CPT | Performed by: INTERNAL MEDICINE

## 2022-11-02 PROCEDURE — 82043 UR ALBUMIN QUANTITATIVE: CPT | Performed by: INTERNAL MEDICINE

## 2022-11-02 PROCEDURE — 80053 COMPREHEN METABOLIC PANEL: CPT | Performed by: INTERNAL MEDICINE

## 2022-11-02 PROCEDURE — 84443 ASSAY THYROID STIM HORMONE: CPT | Performed by: INTERNAL MEDICINE

## 2022-11-02 PROCEDURE — 80061 LIPID PANEL: CPT | Performed by: INTERNAL MEDICINE

## 2022-11-02 NOTE — PROGRESS NOTES
"CC, Type 1 DM                                 History of Present Illness    47 y.o. male     Diabetes Type 1    Duration - since age 15 years old    Complications -    Neuropathy   Retinopathy , cataracts, vision loss    Present Monitoring -      Fingersticks -     CGM - tandem , dexcom     Present Regimen -  Basal, bolus insulin    Carb Intake -   Counts carbs     Exercise -   None    Symptoms -     Numbness, tingling   ==========================================  PE    /60   Pulse 74   Ht 182.9 cm (72\")   Wt (!) 141 kg (311 lb)   SpO2 98%   BMI 42.18 kg/m²   AOx3  No visible goiter  Normal Respiratory Effort   Lymphedema      Labs    Lab Results   Component Value Date    WBC 7.31 12/22/2021    HGB 13.5 12/22/2021    HCT 40.0 12/22/2021    MCV 84.2 12/22/2021     12/22/2021     Lab Results   Component Value Date    GLUCOSE 330 (H) 04/12/2022    BUN 19 04/12/2022    CREATININE 1.06 04/12/2022    EGFRIFNONA 91 12/22/2021    BCR 17.9 04/12/2022     (L) 04/12/2022    K 4.7 04/12/2022    CO2 25.9 04/12/2022    CALCIUM 8.9 04/12/2022    ALBUMIN 4.20 04/12/2022    LABIL2 1.6 07/19/2021    AST 17 04/12/2022    ALT 25 04/12/2022                 ==========================================      ICD-10-CM ICD-9-CM   1. Type 1 diabetes mellitus with hyperglycemia (HCC)  E10.65 250.01   2. Mixed diabetic hyperlipidemia associated with type 1 diabetes mellitus (HCC)  E10.69 250.81    E78.2 272.2   3. Primary hypertension  I10 401.9       Diabetes Mellitus    --------------------------------------------------------------------------------------------------------------------------------------------    Glycemic Management   Lab Results   Component Value Date    HGBA1C 8.2 07/20/2022     Tandem , dexcom     2 week review    89% in range   2% low   Control IQ 6%  overriden bolus 16%    Tandem     Basal 1.83  Correction 20   Carb ratio 5 " "          Glucagon  rx    n    ==========================================================================  Microvascular Complication Monitoring    Neuropathy                       Yes, sensorial , not painful   Retinopathy    yes   Renal     GFR   Lab Results   Component Value Date    EGFR 88.7 04/12/2022       Albuminuria   No results found for: MALBCRERATIO         ==========================================================================    Lipids  Lab Results   Component Value Date    CHOL 178 12/22/2021    CHLPL 199 05/15/2020    TRIG 63 12/22/2021    HDL 41 12/22/2021     (H) 12/22/2021       Statin  crestor 10 mg qhs     ==========================================================================    HTN  /60   Pulse 74   Ht 182.9 cm (72\")   Wt (!) 141 kg (311 lb)   SpO2 98%   BMI 42.18 kg/m²     Lisinopril - hctz   Lasix       ==========================================================================    Bone Health    Vit D    No results found for: [id number]    Calcium intake     ==========================================================================    Thyroid Assessment  Lab Results   Component Value Date    TSH 3.360 12/22/2021           ==========================================================================    Vitamin B12  Lab Results   Component Value Date    [id number] 545 12/22/2021         ==========================================================================    Celiac Panel     Pending     Sleep apnea     refer to sleep medicine     No orders of the defined types were placed in this encounter.               This document has been electronically signed by Bernabe Juarez MD on November 2, 2022 16:18 CDT                      "

## 2022-11-03 DIAGNOSIS — I89.0 LYMPHEDEMA: Primary | ICD-10-CM

## 2022-11-03 DIAGNOSIS — R60.0 LOCALIZED EDEMA: ICD-10-CM

## 2022-11-03 LAB
ALBUMIN UR-MCNC: <1.2 MG/DL
CREAT UR-MCNC: 36.7 MG/DL
MICROALBUMIN/CREAT UR: NORMAL MG/G{CREAT}
VIT B12 BLD-MCNC: 575 PG/ML (ref 211–946)

## 2022-11-17 ENCOUNTER — APPOINTMENT (OUTPATIENT)
Dept: GENERAL RADIOLOGY | Facility: HOSPITAL | Age: 47
End: 2022-11-17

## 2022-11-17 ENCOUNTER — HOSPITAL ENCOUNTER (EMERGENCY)
Facility: HOSPITAL | Age: 47
Discharge: HOME | End: 2022-11-17
Attending: FAMILY MEDICINE | Admitting: FAMILY MEDICINE

## 2022-11-17 VITALS
DIASTOLIC BLOOD PRESSURE: 58 MMHG | SYSTOLIC BLOOD PRESSURE: 122 MMHG | RESPIRATION RATE: 18 BRPM | WEIGHT: 310 LBS | HEART RATE: 65 BPM | TEMPERATURE: 98 F | OXYGEN SATURATION: 97 % | HEIGHT: 72 IN | BODY MASS INDEX: 41.99 KG/M2

## 2022-11-17 DIAGNOSIS — M79.89 LEG SWELLING: Primary | ICD-10-CM

## 2022-11-17 DIAGNOSIS — L03.115 CELLULITIS OF RIGHT LOWER EXTREMITY: ICD-10-CM

## 2022-11-17 LAB
ALBUMIN SERPL-MCNC: 4 G/DL (ref 3.5–5.2)
ALBUMIN/GLOB SERPL: 1.1 G/DL
ALP SERPL-CCNC: 189 U/L (ref 39–117)
ALT SERPL W P-5'-P-CCNC: 44 U/L (ref 1–41)
ANION GAP SERPL CALCULATED.3IONS-SCNC: 10 MMOL/L (ref 5–15)
AST SERPL-CCNC: 29 U/L (ref 1–40)
BASOPHILS # BLD AUTO: 0.1 10*3/MM3 (ref 0–0.2)
BASOPHILS NFR BLD AUTO: 1.1 % (ref 0–1.5)
BILIRUB SERPL-MCNC: 0.6 MG/DL (ref 0–1.2)
BUN SERPL-MCNC: 19 MG/DL (ref 6–20)
BUN/CREAT SERPL: 17.8 (ref 7–25)
CALCIUM SPEC-SCNC: 9.6 MG/DL (ref 8.6–10.5)
CHLORIDE SERPL-SCNC: 96 MMOL/L (ref 98–107)
CK SERPL-CCNC: 157 U/L (ref 20–200)
CO2 SERPL-SCNC: 28 MMOL/L (ref 22–29)
CREAT SERPL-MCNC: 1.07 MG/DL (ref 0.76–1.27)
D-DIMER, QUANTITATIVE (MAD,POW, STR): 2820 NG/ML (FEU) (ref 0–470)
D-LACTATE SERPL-SCNC: 0.8 MMOL/L (ref 0.5–2)
DEPRECATED RDW RBC AUTO: 42.3 FL (ref 37–54)
EGFRCR SERPLBLD CKD-EPI 2021: 87.2 ML/MIN/1.73
EOSINOPHIL # BLD AUTO: 0.34 10*3/MM3 (ref 0–0.4)
EOSINOPHIL NFR BLD AUTO: 3.8 % (ref 0.3–6.2)
ERYTHROCYTE [DISTWIDTH] IN BLOOD BY AUTOMATED COUNT: 13.5 % (ref 12.3–15.4)
GLOBULIN UR ELPH-MCNC: 3.7 GM/DL
GLUCOSE SERPL-MCNC: 117 MG/DL (ref 65–99)
HCT VFR BLD AUTO: 37 % (ref 37.5–51)
HGB BLD-MCNC: 12 G/DL (ref 13–17.7)
HOLD SPECIMEN: NORMAL
IMM GRANULOCYTES # BLD AUTO: 0.04 10*3/MM3 (ref 0–0.05)
IMM GRANULOCYTES NFR BLD AUTO: 0.4 % (ref 0–0.5)
LYMPHOCYTES # BLD AUTO: 1.84 10*3/MM3 (ref 0.7–3.1)
LYMPHOCYTES NFR BLD AUTO: 20.6 % (ref 19.6–45.3)
MAGNESIUM SERPL-MCNC: 2.2 MG/DL (ref 1.6–2.6)
MCH RBC QN AUTO: 27.6 PG (ref 26.6–33)
MCHC RBC AUTO-ENTMCNC: 32.4 G/DL (ref 31.5–35.7)
MCV RBC AUTO: 85.1 FL (ref 79–97)
MONOCYTES # BLD AUTO: 0.85 10*3/MM3 (ref 0.1–0.9)
MONOCYTES NFR BLD AUTO: 9.5 % (ref 5–12)
NEUTROPHILS NFR BLD AUTO: 5.75 10*3/MM3 (ref 1.7–7)
NEUTROPHILS NFR BLD AUTO: 64.6 % (ref 42.7–76)
NRBC BLD AUTO-RTO: 0 /100 WBC (ref 0–0.2)
PLATELET # BLD AUTO: 388 10*3/MM3 (ref 140–450)
PMV BLD AUTO: 9.3 FL (ref 6–12)
POTASSIUM SERPL-SCNC: 4.3 MMOL/L (ref 3.5–5.2)
PROT SERPL-MCNC: 7.7 G/DL (ref 6–8.5)
RBC # BLD AUTO: 4.35 10*6/MM3 (ref 4.14–5.8)
SODIUM SERPL-SCNC: 134 MMOL/L (ref 136–145)
WBC NRBC COR # BLD: 8.92 10*3/MM3 (ref 3.4–10.8)

## 2022-11-17 PROCEDURE — 82550 ASSAY OF CK (CPK): CPT | Performed by: FAMILY MEDICINE

## 2022-11-17 PROCEDURE — 71045 X-RAY EXAM CHEST 1 VIEW: CPT

## 2022-11-17 PROCEDURE — 99283 EMERGENCY DEPT VISIT LOW MDM: CPT

## 2022-11-17 PROCEDURE — 85379 FIBRIN DEGRADATION QUANT: CPT | Performed by: FAMILY MEDICINE

## 2022-11-17 PROCEDURE — 87040 BLOOD CULTURE FOR BACTERIA: CPT | Performed by: FAMILY MEDICINE

## 2022-11-17 PROCEDURE — 83605 ASSAY OF LACTIC ACID: CPT | Performed by: FAMILY MEDICINE

## 2022-11-17 PROCEDURE — 36415 COLL VENOUS BLD VENIPUNCTURE: CPT

## 2022-11-17 PROCEDURE — 80053 COMPREHEN METABOLIC PANEL: CPT | Performed by: FAMILY MEDICINE

## 2022-11-17 PROCEDURE — 83735 ASSAY OF MAGNESIUM: CPT | Performed by: FAMILY MEDICINE

## 2022-11-17 PROCEDURE — 85025 COMPLETE CBC W/AUTO DIFF WBC: CPT | Performed by: FAMILY MEDICINE

## 2022-11-17 RX ORDER — FUROSEMIDE 20 MG/1
20 TABLET ORAL DAILY
Qty: 20 TABLET | Refills: 0 | Status: SHIPPED | OUTPATIENT
Start: 2022-11-17 | End: 2022-12-19

## 2022-11-17 RX ORDER — CEPHALEXIN 500 MG/1
500 CAPSULE ORAL 4 TIMES DAILY
Qty: 40 CAPSULE | Refills: 0 | Status: SHIPPED | OUTPATIENT
Start: 2022-11-17 | End: 2022-12-19

## 2022-11-17 NOTE — ED NOTES
Pt c/o painful right lower leg swelling and redness x2-3 days. Pt was at vascular yesterday and US done with no evidence of blood clot.

## 2022-11-17 NOTE — ED PROVIDER NOTES
Subjective   History of Present Illness  Patient presents emergency department with worsening of his chronic leg swelling.  He states that the symptoms have worsened over the past 2 days.  He is currently being worked up in the clinic for a questionable diagnosis of lymphedema.  He had an ultrasound of his lower extremities performed yesterday that were negative for any DVTs.  He states that the right leg has become red and painful and has more swelling than the left.      Leg Pain  Location:  Leg  Leg location:  L leg and R leg  Pain details:     Quality:  Aching    Severity:  Moderate    Duration:  2 days    Timing:  Constant    Progression:  Worsening  Relieved by:  Rest  Worsened by:  Activity  Associated symptoms: no fatigue, no fever and no neck pain    Leg Swelling  Associated symptoms: no abdominal pain, no chest pain, no congestion, no cough, no diarrhea, no ear pain, no fatigue, no fever, no headaches, no myalgias, no nausea, no rash, no rhinorrhea, no shortness of breath, no sore throat, no vomiting and no wheezing        Review of Systems   Constitutional: Negative for appetite change, chills, diaphoresis, fatigue and fever.   HENT: Negative for congestion, ear discharge, ear pain, nosebleeds, rhinorrhea, sinus pressure, sore throat and trouble swallowing.    Eyes: Negative for discharge and redness.   Respiratory: Negative for apnea, cough, chest tightness, shortness of breath and wheezing.    Cardiovascular: Positive for leg swelling. Negative for chest pain.   Gastrointestinal: Negative for abdominal pain, diarrhea, nausea and vomiting.   Endocrine: Negative for polyuria.   Genitourinary: Negative for dysuria, frequency and urgency.   Musculoskeletal: Negative for myalgias and neck pain.   Skin: Positive for color change. Negative for rash.   Allergic/Immunologic: Negative for immunocompromised state.   Neurological: Negative for dizziness, seizures, syncope, weakness, light-headedness and headaches.    Hematological: Negative for adenopathy. Does not bruise/bleed easily.   Psychiatric/Behavioral: Negative for behavioral problems and confusion.   All other systems reviewed and are negative.      Past Medical History:   Diagnosis Date   • Diabetes mellitus (HCC)        Allergies   Allergen Reactions   • Phenobarbital Seizure       Past Surgical History:   Procedure Laterality Date   • APPENDECTOMY     • EYE SURGERY         Family History   Problem Relation Age of Onset   • Heart disease Mother    • Diabetes Mother    • Diabetes Father    • Diabetes Sister    • Hypertension Sister    • Diabetes Maternal Grandmother    • Heart disease Maternal Grandfather    • Diabetes Paternal Grandmother    • Cancer Paternal Grandfather        Social History     Socioeconomic History   • Marital status:    Tobacco Use   • Smoking status: Never   • Smokeless tobacco: Never   Substance and Sexual Activity   • Alcohol use: Never   • Drug use: Never   • Sexual activity: Yes           Objective   Physical Exam  Vitals and nursing note reviewed.   Constitutional:       Appearance: He is well-developed.   HENT:      Head: Normocephalic and atraumatic.      Nose: Nose normal.   Eyes:      General: No scleral icterus.        Right eye: No discharge.         Left eye: No discharge.      Conjunctiva/sclera: Conjunctivae normal.      Pupils: Pupils are equal, round, and reactive to light.   Neck:      Trachea: No tracheal deviation.   Cardiovascular:      Rate and Rhythm: Normal rate and regular rhythm.      Heart sounds: Normal heart sounds. No murmur heard.  Pulmonary:      Effort: Pulmonary effort is normal. No respiratory distress.      Breath sounds: Normal breath sounds. No stridor. No wheezing or rales.   Abdominal:      General: Bowel sounds are normal. There is no distension.      Palpations: Abdomen is soft. There is no mass.      Tenderness: There is no abdominal tenderness. There is no guarding or rebound.    Musculoskeletal:         General: Swelling present.      Cervical back: Normal range of motion and neck supple.      Comments: Bilateral lower leg leg swelling that involves the foot ankle and lower extremity.  The swelling is greater on the right side.  There is developing erythema and warmth bilaterally.   Skin:     General: Skin is warm and dry.      Findings: No erythema or rash.   Neurological:      Mental Status: He is alert and oriented to person, place, and time.      Coordination: Coordination normal.   Psychiatric:         Behavior: Behavior normal.         Thought Content: Thought content normal.         Procedures           ED Course             Labs Reviewed   COMPREHENSIVE METABOLIC PANEL - Abnormal; Notable for the following components:       Result Value    Glucose 117 (*)     Sodium 134 (*)     Chloride 96 (*)     ALT (SGPT) 44 (*)     Alkaline Phosphatase 189 (*)     All other components within normal limits    Narrative:     GFR Normal >60  Chronic Kidney Disease <60  Kidney Failure <15     CBC WITH AUTO DIFFERENTIAL - Abnormal; Notable for the following components:    Hemoglobin 12.0 (*)     Hematocrit 37.0 (*)     All other components within normal limits   D-DIMER, QUANTITATIVE - Abnormal; Notable for the following components:    D-Dimer, Quantitative 2,820 (*)     All other components within normal limits    Narrative:     Dimer values <500 ng/ml FEU are FDA approved as aid in diagnosis of deep venous thrombosis and pulmonary embolism.  This test should not be used in an exclusion strategy with pretest probability alone.    A recent guideline regarding diagnosis for pulmonary thromboembolism recommends an adjusted exclusion criterion of age x 10 ng/ml FEU for patients >50 years of age (Nikole Intern Med 2015; 163: 701-711).     BLOOD CULTURE - Normal   LACTIC ACID, PLASMA - Normal   CK - Normal   MAGNESIUM - Normal   CBC AND DIFFERENTIAL    Narrative:     The following orders were created for  panel order CBC & Differential.  Procedure                               Abnormality         Status                     ---------                               -----------         ------                     CBC Auto Differential[[id number]]        Abnormal            Final result                 Please view results for these tests on the individual orders.   EXTRA TUBES    Narrative:     The following orders were created for panel order Extra Tubes.  Procedure                               Abnormality         Status                     ---------                               -----------         ------                     Gold Top - SST[[id number]]                                   Final result                 Please view results for these tests on the individual orders.   GOLD TOP - SST       XR Chest 1 View   Final Result      No evidence of acute cardiopulmonary disease on this single view   chest x-ray.      Electronically signed by:  Wm Henson DO  11/17/2022 8:47 AM   [facility] Workstation: [id number]                                          [facility]    Final diagnoses:   Leg swelling   Cellulitis of right lower extremity       ED Disposition  ED Disposition     ED Disposition   Discharge    Condition   Stable    Comment   --             Rachel Sue, TRISHA  [address] [place] [address]  [phone]    In 4 days      Rex Frias APRN  [address] [place] [address]  [phone]    In 4 days           Medication List      New Prescriptions    cephalexin 500 MG capsule  Commonly known as: KEFLEX  Take 1 capsule by mouth 4 (Four) Times a Day.        Changed    * furosemide 20 MG tablet  Commonly known as: Lasix  Take 1 tablet by mouth 2 (Two) Times a Day.  What changed: Another medication with the same name was added. Make sure you understand how and when to take each.     * furosemide 20 MG tablet  Commonly known as: LASIX  Take 1 tablet by mouth Daily.  What changed: You were already taking a  medication with the same name, and this prescription was added. Make sure you understand how and when to take each.         * This list has 2 medication(s) that are the same as other medications prescribed for you. Read the directions carefully, and ask your doctor or other care provider to review them with you.               Where to Get Your Medications      These medications were sent to [facility] [id number] - [place], [place] - [address] [place] [address] [place]WKINS AT [place] [id number] - [phone]  - [phone] [address] [place], [place] [id number]    Phone: [phone]   · cephalexin 500 MG capsule  · furosemide 20 MG tablet          Jeanmarie Castelan MD  11/18/22 [id number]

## 2022-11-18 ENCOUNTER — OFFICE VISIT (OUTPATIENT)
Dept: FAMILY MEDICINE CLINIC | Facility: CLINIC | Age: 47
End: 2022-11-18

## 2022-11-18 VITALS
HEIGHT: 72 IN | DIASTOLIC BLOOD PRESSURE: 64 MMHG | WEIGHT: 310 LBS | RESPIRATION RATE: 24 BRPM | OXYGEN SATURATION: 99 % | HEART RATE: 81 BPM | SYSTOLIC BLOOD PRESSURE: 128 MMHG | BODY MASS INDEX: 41.99 KG/M2

## 2022-11-18 DIAGNOSIS — I89.0 LYMPHEDEMA: Primary | ICD-10-CM

## 2022-11-18 DIAGNOSIS — L03.115 CELLULITIS OF RIGHT LOWER EXTREMITY: ICD-10-CM

## 2022-11-18 DIAGNOSIS — M79.89 LEG SWELLING: ICD-10-CM

## 2022-11-18 DIAGNOSIS — E66.01 OBESITY, MORBID: ICD-10-CM

## 2022-11-18 PROCEDURE — 99213 OFFICE O/P EST LOW 20 MIN: CPT | Performed by: NURSE PRACTITIONER

## 2022-11-18 NOTE — PROGRESS NOTES
Chief Complaint  ER discharge follow up (Went to ER on 11/17/2022 for bilateral leg swelling; right leg worse)    Subjective          Taylor Le presents to [facility] [place] er follow up for leg swelling. He is currently still taking medications given to him at ER, leg is somewhat improving         Leg Swelling  This is a chronic problem. The current episode started more than 1 month ago. The problem occurs constantly. The problem has been waxing and waning. Pertinent negatives include no arthralgias, congestion, coughing, diaphoresis, fatigue, myalgias or nausea. The symptoms are aggravated by standing. The treatment provided no relief.     Outpatient Medications Prior to Visit   Medication Sig Dispense Refill   • ascorbic acid (VITAMIN C) 500 MG tablet Take 500 mg by mouth Daily.     • aspirin 81 MG chewable tablet Chew 81 mg Daily.     • cephalexin (KEFLEX) 500 MG capsule Take 1 capsule by mouth 4 (Four) Times a Day. 40 capsule 0   • Continuous Blood Gluc  (Dexcom G6 ) device 1 each 1 (One) Time As Needed (one device a year) for up to 1 dose. 1 each 0   • Continuous Blood Gluc  (FreeStyle Sander 14 Day Centre) device 1 Device As Needed (Check blood sugar 4 times a day and  PRN). 1 each 0   • Continuous Blood Gluc Sensor (Dexcom G6 Sensor) Every 10 (Ten) Days. 3 each 3   • Continuous Blood Gluc Sensor (FreeStyle Sander 14 Day Sensor) misc 1 each Every 14 (Fourteen) Days. 3 each 11   • Continuous Blood Gluc Transmit (Dexcom G6 Transmitter) misc 1 each Every 3 (Three) Months. 1 each 3   • furosemide (Lasix) 20 MG tablet Take 1 tablet by mouth 2 (Two) Times a Day. 60 tablet 3   • furosemide (LASIX) 20 MG tablet Take 1 tablet by mouth Daily. 20 tablet 0   • Glucagon (Gvoke HypoPen 2-Pack) 1 MG/0.2ML solution auto-injector Inject 1 mg under the skin into the appropriate area as directed As Needed (for hypoglycemia). 0.4 mL 1   • Insulin Aspart  "(novoLOG) 100 UNIT/ML injection 100 units per day via insulin pump 30 mL 6   • Insulin Glargine (LANTUS SOLOSTAR) 100 UNIT/ML injection pen Inject 30 Units under the skin into the appropriate area as directed 2 (Two) Times a Day. 10 pen 11   • lisinopril-hydrochlorothiazide (PRINZIDE,ZESTORETIC) 20-25 MG per tablet Take 1 tablet by mouth Daily. 30 tablet 11   • rosuvastatin (Crestor) 10 MG tablet Take 1 tablet by mouth Every Night. 30 tablet 11     No facility-administered medications prior to visit.       Review of Systems   Constitutional: Negative for diaphoresis and fatigue.   HENT: Negative for congestion.    Respiratory: Negative for cough.    Gastrointestinal: Negative for nausea.   Musculoskeletal: Negative for arthralgias and myalgias.         Objective   Vital Signs:   Visit Vitals  /64 (BP Location: Left arm, Patient Position: Sitting, Cuff Size: Large Adult)   Pulse 81   Resp 24   Ht 182.9 cm (72\")   Wt (!) 141 kg (310 lb)   SpO2 99%   BMI 42.04 kg/m²     Physical Exam  Vitals and nursing note reviewed.   Constitutional:       Appearance: Normal appearance. He is well-developed.   HENT:      Head: Normocephalic and atraumatic.      Right Ear: Hearing normal.      Left Ear: Hearing normal.      Nose: Nose normal. No mucosal edema or rhinorrhea.   Eyes:      General: Lids are normal.      Conjunctiva/sclera: Conjunctivae normal.      Pupils: Pupils are equal, round, and reactive to light.   Neck:      Thyroid: No thyroid mass or thyromegaly.      Trachea: Trachea normal. No tracheal tenderness or tracheal deviation.   Cardiovascular:      Rate and Rhythm: Normal rate.      Pulses: Normal pulses.      Heart sounds: Normal heart sounds.   Pulmonary:      Effort: Pulmonary effort is normal. No respiratory distress.      Breath sounds: Normal breath sounds. No stridor. No wheezing or rales.   Abdominal:      Palpations: Abdomen is soft.   Musculoskeletal:      Cervical back: Normal range of motion.      " Right lower leg: Swelling and tenderness present. 2+ Edema present.      Left lower le+ Edema present.      Right ankle: Tenderness present. Decreased range of motion.      Left ankle: Swelling present.        Legs:    Skin:     General: Skin is warm and dry.   Neurological:      Mental Status: He is alert and oriented to person, place, and time.   Psychiatric:         Mood and Affect: Mood is not anxious. Affect is not inappropriate.         Speech: Speech normal.         Behavior: Behavior normal. Behavior is not agitated or hyperactive.         Thought Content: Thought content normal.         Judgment: Judgment normal.        Result Review :                 Assessment and Plan    Diagnoses and all orders for this visit:    1. Lymphedema (Primary)  -     Ambulatory Referral to Vascular Surgery    2. Leg swelling  -     Ambulatory Referral to Vascular Surgery    3. Obesity, morbid (HCC)  -     Ambulatory Referral to Vascular Surgery    4. Cellulitis of right lower extremity      Continue current medications   Please call the office if you have issues    Referral to Dr. Mejia at [facility] for Lymphedema     Continue to elevate leg when possible    Please call if changes are noted         Follow Up   Return if symptoms worsen or fail to improve, for Next scheduled follow up.  Patient was given instructions and counseling regarding his condition or for health maintenance advice. Please see specific information pulled into the AVS if appropriate.           This document has been electronically signed by TRISHA Parra on 2022 13:11 CST

## 2022-11-22 LAB — BACTERIA SPEC AEROBE CULT: NORMAL

## 2022-11-30 DIAGNOSIS — M79.89 LEG SWELLING: Primary | ICD-10-CM

## 2022-11-30 DIAGNOSIS — M79.671 RIGHT FOOT PAIN: ICD-10-CM

## 2022-11-30 DIAGNOSIS — S93.324A DISLOCATION OF TARSOMETATARSAL JOINT OF RIGHT FOOT, INITIAL ENCOUNTER: Primary | ICD-10-CM

## 2022-12-01 ENCOUNTER — TELEPHONE (OUTPATIENT)
Dept: FAMILY MEDICINE CLINIC | Facility: CLINIC | Age: 47
End: 2022-12-01

## 2022-12-01 NOTE — TELEPHONE ENCOUNTER
I called the pt and let him know that he is seeing TRISHA Bhakta, in Podiatry tomorrow at 2:00 and to arrive @15-20 minuters early to fill out new pt paperwork

## 2022-12-02 ENCOUNTER — HOSPITAL ENCOUNTER (OUTPATIENT)
Dept: CT IMAGING | Facility: HOSPITAL | Age: 47
Discharge: HOME | End: 2022-12-02
Admitting: NURSE PRACTITIONER

## 2022-12-02 ENCOUNTER — OFFICE VISIT (OUTPATIENT)
Dept: PODIATRY | Facility: CLINIC | Age: 47
End: 2022-12-02

## 2022-12-02 VITALS — WEIGHT: 310 LBS | BODY MASS INDEX: 41.99 KG/M2 | HEIGHT: 72 IN | HEART RATE: 78 BPM | OXYGEN SATURATION: 98 %

## 2022-12-02 DIAGNOSIS — M14.679 CHARCOT ARTHROPATHY OF MIDFOOT: ICD-10-CM

## 2022-12-02 DIAGNOSIS — S93.324A LISFRANC DISLOCATION, RIGHT, INITIAL ENCOUNTER: Primary | ICD-10-CM

## 2022-12-02 DIAGNOSIS — E11.42 TYPE 2 DIABETES MELLITUS WITH DIABETIC POLYNEUROPATHY, WITH LONG-TERM CURRENT USE OF INSULIN: ICD-10-CM

## 2022-12-02 DIAGNOSIS — Z79.4 TYPE 2 DIABETES MELLITUS WITH DIABETIC POLYNEUROPATHY, WITH LONG-TERM CURRENT USE OF INSULIN: ICD-10-CM

## 2022-12-02 DIAGNOSIS — M79.672 LEFT FOOT PAIN: ICD-10-CM

## 2022-12-02 DIAGNOSIS — S93.324A LISFRANC DISLOCATION, RIGHT, INITIAL ENCOUNTER: ICD-10-CM

## 2022-12-02 PROCEDURE — 73700 CT LOWER EXTREMITY W/O DYE: CPT

## 2022-12-02 PROCEDURE — 99214 OFFICE O/P EST MOD 30 MIN: CPT | Performed by: NURSE PRACTITIONER

## 2022-12-02 NOTE — PROGRESS NOTES
Taylor Le  1975  47 y.o. male   PCP: Radha Sue 11/30/2022  BS: 106 per patient       12/02/2022    Chief Complaint   Patient presents with   • Right Foot - Fracture       History of Present Illness    Taylor Le is a 47 y.o.male came to clinic with wife with concern of right foot pain. Patient obtained Xray on 11/30/2022. Patient was concern about swelling went to ER on 11/17/2022. Patient was placed on antibiotics and had lab work. Patient states he has no injury's.           Past Medical History:   Diagnosis Date   • Diabetes mellitus (HCC)          Past Surgical History:   Procedure Laterality Date   • APPENDECTOMY     • EYE SURGERY           Family History   Problem Relation Age of Onset   • Heart disease Mother    • Diabetes Mother    • Diabetes Father    • Diabetes Sister    • Hypertension Sister    • Diabetes Maternal Grandmother    • Heart disease Maternal Grandfather    • Diabetes Paternal Grandmother    • Cancer Paternal Grandfather        Allergies   Allergen Reactions   • Phenobarbital Seizure       Social History     Socioeconomic History   • Marital status:    Tobacco Use   • Smoking status: Never   • Smokeless tobacco: Never   Vaping Use   • Vaping Use: Never used   Substance and Sexual Activity   • Alcohol use: Never   • Drug use: Never   • Sexual activity: Yes         Current Outpatient Medications   Medication Sig Dispense Refill   • ascorbic acid (VITAMIN C) 500 MG tablet Take 500 mg by mouth Daily.     • aspirin 81 MG chewable tablet Chew 81 mg Daily.     • Continuous Blood Gluc  (Dexcom G6 ) device 1 each 1 (One) Time As Needed (one device a year) for up to 1 dose. 1 each 0   • Continuous Blood Gluc  (FreeStyle Sander 14 Day [place]) device 1 Device As Needed (Check blood sugar 4 times a day and  PRN). 1 each 0   • Continuous Blood Gluc Sensor (Dexcom G6 Sensor) Every 10 (Ten) Days. 3 each 3   • Continuous Blood Gluc Sensor (FreeStyle Sander 14 Day Sensor)  "misc 1 each Every 14 (Fourteen) Days. 3 each 11   • Continuous Blood Gluc Transmit (Dexcom G6 Transmitter) misc 1 each Every 3 (Three) Months. 1 each 3   • furosemide (Lasix) 20 MG tablet Take 1 tablet by mouth 2 (Two) Times a Day. 60 tablet 3   • furosemide (LASIX) 20 MG tablet Take 1 tablet by mouth Daily. 20 tablet 0   • Glucagon (Gvoke HypoPen 2-Pack) 1 MG/0.2ML solution auto-injector Inject 1 mg under the skin into the appropriate area as directed As Needed (for hypoglycemia). 0.4 mL 1   • Insulin Aspart (novoLOG) 100 UNIT/ML injection 100 units per day via insulin pump 30 mL 6   • Insulin Glargine (LANTUS SOLOSTAR) 100 UNIT/ML injection pen Inject 30 Units under the skin into the appropriate area as directed 2 (Two) Times a Day. 10 pen 11   • lisinopril-hydrochlorothiazide (PRINZIDE,ZESTORETIC) 20-25 MG per tablet Take 1 tablet by mouth Daily. 30 tablet 11   • rosuvastatin (Crestor) 10 MG tablet Take 1 tablet by mouth Every Night. 30 tablet 11   • cephalexin (KEFLEX) 500 MG capsule Take 1 capsule by mouth 4 (Four) Times a Day. 40 capsule 0     No current facility-administered medications for this visit.       Review of Systems   Constitutional: Negative.    HENT: Negative.    Eyes: Negative.    Respiratory: Negative.    Cardiovascular: Negative.    Gastrointestinal: Negative.    Endocrine: Negative.    Genitourinary: Negative.    Musculoskeletal:        Foot pain    Skin: Negative.    Allergic/Immunologic: Negative.    Neurological: Negative.    Hematological: Negative.    Psychiatric/Behavioral: Negative.    All other systems reviewed and are negative.        OBJECTIVE    Pulse 78   Ht 182.9 cm (72\")   Wt (!) 141 kg (310 lb)   SpO2 98%   BMI 42.04 kg/m²     Physical Exam  Vitals reviewed.   Constitutional:       Appearance: Normal appearance. He is well-developed.   HENT:      Head: Normocephalic and atraumatic.   Neck:      Trachea: Trachea and phonation normal.   Cardiovascular:      Pulses:           " Dorsalis pedis pulses are detected w/ Doppler on the right side and detected w/ Doppler on the left side.        Posterior tibial pulses are detected w/ Doppler on the right side and detected w/ Doppler on the left side.   Pulmonary:      Effort: Pulmonary effort is normal. No respiratory distress.   Abdominal:      General: There is no distension.      Palpations: Abdomen is soft.   Musculoskeletal:      Right foot: Decreased range of motion. Swelling (Bilateral lymphedema) and Charcot foot present. No tenderness.      Left foot: Decreased range of motion. Swelling (Bilateral lymphedema) present. No tenderness.   Feet:      Right foot:      Skin integrity: Skin integrity normal.      Toenail Condition: Right toenails are normal.      Left foot:      Skin integrity: Skin integrity normal.      Toenail Condition: Left toenails are normal.   Skin:     General: Skin is warm and dry.   Neurological:      Mental Status: He is alert and oriented to person, place, and time.      GCS: GCS eye subscore is 4. GCS verbal subscore is 5. GCS motor subscore is 6.   Psychiatric:         Speech: Speech normal.         Behavior: Behavior normal. Behavior is cooperative.         Thought Content: Thought content normal.         Judgment: Judgment normal.                Procedures        ASSESSMENT AND PLAN    Diagnoses and all orders for this visit:    1. Lisfranc dislocation, right, initial encounter (Primary)  -     CT FOOT RIGHT WITHOUT CONTRAST; Future    2. Left foot pain  -     Cancel: CT FOOT LEFT WITHOUT CONTRAST; Future  -     CT FOOT RIGHT WITHOUT CONTRAST; Future    3. Type 2 diabetes mellitus with diabetic polyneuropathy, with long-term current use of insulin ([facility])  -     CT FOOT RIGHT WITHOUT CONTRAST; Future    4. Charcot arthropathy of midfoot  -     CT FOOT RIGHT WITHOUT CONTRAST; Future        Reviewed x-rays with Dr. Ceballos who recommended CT scan and follow-up afterwards to discuss surgical options.  Discussed this  with the patient and his significant other and they verbalized understand the plan of care and were sent to the hospital to obtain a CT scan.  He was placed in a Ortho boot prior to leaving, he was neurovascularly intact and I recommended modified weightbearing with crutches and/or walker.  We also discussed lymphedema treatment including compression stockings which advised the patient to wear and maintain elevation is much as possible over the next week.  He was instructed to contact the office for any worsening symptoms and performs frequent skin checks.          This document has been electronically signed by Carlo RICO, FNP-C, ONP-C on December 2, 2022 14:47 CST

## 2022-12-05 ENCOUNTER — OFFICE VISIT (OUTPATIENT)
Dept: FAMILY MEDICINE CLINIC | Facility: CLINIC | Age: 47
End: 2022-12-05

## 2022-12-05 VITALS
HEART RATE: 77 BPM | WEIGHT: 293.3 LBS | HEIGHT: 72 IN | SYSTOLIC BLOOD PRESSURE: 116 MMHG | BODY MASS INDEX: 39.73 KG/M2 | RESPIRATION RATE: 24 BRPM | OXYGEN SATURATION: 99 % | DIASTOLIC BLOOD PRESSURE: 62 MMHG

## 2022-12-05 DIAGNOSIS — Z00.00 MEDICARE ANNUAL WELLNESS VISIT, SUBSEQUENT: Primary | ICD-10-CM

## 2022-12-05 PROCEDURE — 1170F FXNL STATUS ASSESSED: CPT | Performed by: NURSE PRACTITIONER

## 2022-12-05 PROCEDURE — 1159F MED LIST DOCD IN RCRD: CPT | Performed by: NURSE PRACTITIONER

## 2022-12-05 PROCEDURE — G0439 PPPS, SUBSEQ VISIT: HCPCS | Performed by: NURSE PRACTITIONER

## 2022-12-05 PROCEDURE — 1126F AMNT PAIN NOTED NONE PRSNT: CPT | Performed by: NURSE PRACTITIONER

## 2022-12-05 PROCEDURE — 96160 PT-FOCUSED HLTH RISK ASSMT: CPT | Performed by: NURSE PRACTITIONER

## 2022-12-05 NOTE — PROGRESS NOTES
Chief Complaint  Medicare Wellness-subsequent    Subjective     {Problem List  Visit Diagnosis   Encounters  Notes  Medications  Labs  Result Review Imaging  Media :23}     Taylor Le presents to [facility] [place]  History of Present Illness  Outpatient Medications Prior to Visit   Medication Sig Dispense Refill   • ascorbic acid (VITAMIN C) 500 MG tablet Take 500 mg by mouth Daily.     • aspirin 81 MG chewable tablet Chew 81 mg Daily.     • cephalexin (KEFLEX) 500 MG capsule Take 1 capsule by mouth 4 (Four) Times a Day. 40 capsule 0   • Continuous Blood Gluc  (Dexcom G6 ) device 1 each 1 (One) Time As Needed (one device a year) for up to 1 dose. 1 each 0   • Continuous Blood Gluc  (FreeStyle Sander 14 Day [place]) device 1 Device As Needed (Check blood sugar 4 times a day and  PRN). 1 each 0   • Continuous Blood Gluc Sensor (Dexcom G6 Sensor) Every 10 (Ten) Days. 3 each 3   • Continuous Blood Gluc Sensor (FreeStyle Sander 14 Day Sensor) misc 1 each Every 14 (Fourteen) Days. 3 each 11   • Continuous Blood Gluc Transmit (Dexcom G6 Transmitter) misc 1 each Every 3 (Three) Months. 1 each 3   • furosemide (Lasix) 20 MG tablet Take 1 tablet by mouth 2 (Two) Times a Day. 60 tablet 3   • furosemide (LASIX) 20 MG tablet Take 1 tablet by mouth Daily. 20 tablet 0   • Glucagon (Gvoke HypoPen 2-Pack) 1 MG/0.2ML solution auto-injector Inject 1 mg under the skin into the appropriate area as directed As Needed (for hypoglycemia). 0.4 mL 1   • Insulin Aspart (novoLOG) 100 UNIT/ML injection 100 units per day via insulin pump 30 mL 6   • Insulin Glargine (LANTUS SOLOSTAR) 100 UNIT/ML injection pen Inject 30 Units under the skin into the appropriate area as directed 2 (Two) Times a Day. 10 pen 11   • lisinopril-hydrochlorothiazide (PRINZIDE,ZESTORETIC) 20-25 MG per tablet Take 1 tablet by mouth Daily. 30 tablet 11   • rosuvastatin (Crestor) 10 MG tablet  "Take 1 tablet by mouth Every Night. 30 tablet 11     No facility-administered medications prior to visit.       Review of Systems      Objective   Vital Signs:   Visit Vitals  /62 (BP Location: Right arm, Patient Position: Sitting, Cuff Size: Large Adult)   Pulse 77   Resp 24   Ht 182.9 cm (72\")   Wt 133 kg (293 lb 4.8 oz)   SpO2 99%   BMI 39.78 kg/m²     Physical Exam   Result Review :{Labs  Result Review  Imaging  Med Tab  Media :23}   {The following data was reviewed by (Optional):87015}  {Ambulatory Labs (Optional):75164}  {Data reviewed (Optional):68385:::1}          Assessment and Plan {CC Problem List  Visit Diagnosis  ROS  Review (Popup)  Health Maintenance  Quality  BestPractice  Medications  SmartSets  SnapShot Encounters  Media :23}   There are no diagnoses linked to this encounter.  {Time Spent (Optional):27817}  Follow Up {Instructions Charge Capture  Follow-up Communications :23}  No follow-ups on file.  Patient was given instructions and counseling regarding his condition or for health maintenance advice. Please see specific information pulled into the AVS if appropriate.           This document has been electronically signed by TRISHA Parra on December 5, 2022 16:03 CST  "

## 2022-12-05 NOTE — PROGRESS NOTES
The ABCs of the Annual Wellness Visit  Subsequent Medicare Wellness Visit    Chief Complaint   Patient presents with   • Medicare Wellness-subsequent      Subjective    History of Present Illness:  Taylor Le is a 47 y.o. male who presents for a Subsequent Medicare Wellness Visit.    The following portions of the patient's history were reviewed and   updated as appropriate: allergies, current medications, past family history, past medical history, past social history, past surgical history and problem list.    Compared to one year ago, the patient feels his physical   health is the same.    Compared to one year ago, the patient feels his mental   health is better.      Currently has a Homolateral Lisfranc fracture in right foot that he is following with podiatry for. He will see Dr. Ceballos on Wednesday to find out his plan.   No injury noted to right foot, unknown how this happened.   Recent Hospitalizations:  He was not admitted to the hospital during the last year.       Current Medical Providers:  Patient Care Team:  Rachel Sue APRN as PCP - General (Urgent Care)    Outpatient Medications Prior to Visit   Medication Sig Dispense Refill   • ascorbic acid (VITAMIN C) 500 MG tablet Take 500 mg by mouth Daily.     • aspirin 81 MG chewable tablet Chew 81 mg Daily.     • cephalexin (KEFLEX) 500 MG capsule Take 1 capsule by mouth 4 (Four) Times a Day. 40 capsule 0   • Continuous Blood Gluc  (Dexcom G6 ) device 1 each 1 (One) Time As Needed (one device a year) for up to 1 dose. 1 each 0   • Continuous Blood Gluc  (FreeStyle Sander 14 Day [place]) device 1 Device As Needed (Check blood sugar 4 times a day and  PRN). 1 each 0   • Continuous Blood Gluc Sensor (Dexcom G6 Sensor) Every 10 (Ten) Days. 3 each 3   • Continuous Blood Gluc Sensor (FreeStyle Sander 14 Day Sensor) misc 1 each Every 14 (Fourteen) Days. 3 each 11   • Continuous Blood Gluc Transmit (Dexcom G6 Transmitter) misc 1 each Every 3  "(Three) Months. 1 each 3   • furosemide (Lasix) 20 MG tablet Take 1 tablet by mouth 2 (Two) Times a Day. 60 tablet 3   • furosemide (LASIX) 20 MG tablet Take 1 tablet by mouth Daily. 20 tablet 0   • Glucagon (Gvoke HypoPen 2-Pack) 1 MG/0.2ML solution auto-injector Inject 1 mg under the skin into the appropriate area as directed As Needed (for hypoglycemia). 0.4 mL 1   • Insulin Aspart (novoLOG) 100 UNIT/ML injection 100 units per day via insulin pump 30 mL 6   • Insulin Glargine (LANTUS SOLOSTAR) 100 UNIT/ML injection pen Inject 30 Units under the skin into the appropriate area as directed 2 (Two) Times a Day. 10 pen 11   • lisinopril-hydrochlorothiazide (PRINZIDE,ZESTORETIC) 20-25 MG per tablet Take 1 tablet by mouth Daily. 30 tablet 11   • rosuvastatin (Crestor) 10 MG tablet Take 1 tablet by mouth Every Night. 30 tablet 11     No facility-administered medications prior to visit.       No opioid medication identified on active medication list. I have reviewed chart for other potential  high risk medication/s and harmful drug interactions in the elderly.          Aspirin is on active medication list. Aspirin use is indicated based on review of current medical condition/s. Pros and cons of this therapy have been discussed today. Benefits of this medication outweigh potential harm.  Patient has been encouraged to continue taking this medication.  .      There is no problem list on file for this patient.    Advance Care Planning  Advance Directive is not on file.  ACP discussion was declined by the patient. Patient does not have an advance directive, declines further assistance.          Objective    Vitals:    12/05/22 1555   BP: 116/62   BP Location: Right arm   Patient Position: Sitting   Cuff Size: Large Adult   Pulse: 77   Resp: 24   SpO2: 99%   Weight: 133 kg (293 lb 4.8 oz)   Height: 182.9 cm (72\")   PainSc: 0-No pain     Estimated body mass index is 39.78 kg/m² as calculated from the following:    Height as of " "this encounter: 182.9 cm (72\").    Weight as of this encounter: 133 kg (293 lb 4.8 oz).    Class 2 Severe Obesity (BMI >=35 and <=39.9). Obesity-related health conditions include the following: hypertension, diabetes mellitus and dyslipidemias. Obesity is unchanged. BMI is is above average; BMI management plan is completed. We discussed low calorie, low carb based diet program, portion control and increasing exercise.      Does the patient have evidence of cognitive impairment? No    Physical Exam  Vitals and nursing note reviewed.   Constitutional:       Appearance: Normal appearance. He is well-developed.   HENT:      Head: Normocephalic and atraumatic.      Right Ear: Hearing normal.      Left Ear: Hearing normal.      Nose: Nose normal. No mucosal edema or rhinorrhea.   Eyes:      General: Lids are normal.      Conjunctiva/sclera: Conjunctivae normal.      Pupils: Pupils are equal, round, and reactive to light.   Neck:      Thyroid: No thyroid mass or thyromegaly.      Trachea: Trachea normal. No tracheal tenderness or tracheal deviation.   Cardiovascular:      Rate and Rhythm: Normal rate.      Pulses: Normal pulses.      Heart sounds: Normal heart sounds.   Pulmonary:      Effort: Pulmonary effort is normal. No respiratory distress.      Breath sounds: Normal breath sounds. No stridor. No wheezing or rales.   Abdominal:      Palpations: Abdomen is soft.   Musculoskeletal:         General: Normal range of motion.      Cervical back: Normal range of motion.      Left lower leg: Swelling present.        Legs:       Comments: Right foot in boot, using a scooter to help ambulate    Lymphadenopathy:      Head:      Right side of head: No submental, submandibular or tonsillar adenopathy.      Left side of head: No submental, submandibular or tonsillar adenopathy.      Cervical: No cervical adenopathy.   Skin:     General: Skin is warm and dry.   Neurological:      Mental Status: He is alert and oriented to person, " place, and time.   Psychiatric:         Mood and Affect: Mood is not anxious. Affect is not inappropriate.         Speech: Speech normal.         Behavior: Behavior normal. Behavior is not agitated or hyperactive.         Thought Content: Thought content normal.         Judgment: Judgment normal.       Lab Results   Component Value Date    TRIG 70 11/02/2022    HDL 48 11/02/2022     (H) 11/02/2022    VLDL 13 11/02/2022    HGBA1C 6.90 (H) 11/02/2022            HEALTH RISK ASSESSMENT    Smoking Status:  Social History     Tobacco Use   Smoking Status Never   Smokeless Tobacco Never     Alcohol Consumption:  Social History     Substance and Sexual Activity   Alcohol Use Never     Fall Risk Screen:    STEADI Fall Risk Assessment has not been completed.    Depression Screening:  PHQ-2/PHQ-9 Depression Screening 12/5/2022   Retired PHQ-9 Total Score -   Retired Total Score -   Little Interest or Pleasure in Doing Things 0-->not at all   Feeling Down, Depressed or Hopeless 0-->not at all   PHQ-9: Brief Depression Severity Measure Score 0       Health Habits and Functional and Cognitive Screening:  Functional & Cognitive Status 12/5/2022   Do you have difficulty preparing food and eating? No   Do you have difficulty bathing yourself, getting dressed or grooming yourself? No   Do you have difficulty using the toilet? No   Do you have difficulty moving around from place to place? No   Do you have trouble with steps or getting out of a bed or a chair? No   Current Diet Diabetic Diet   Dental Exam Not up to date   Eye Exam Up to date   Exercise (times per week) 4 times per week   Current Exercises Include Walking   Do you need help using the phone?  No   Are you deaf or do you have serious difficulty hearing?  No   Do you need help with transportation? No   Do you need help shopping? No   Do you need help preparing meals?  No   Do you need help with housework?  No   Do you need help with laundry? No   Do you need help  taking your medications? No   Do you need help managing money? No   Do you ever drive or ride in a car without wearing a seat belt? No   Have you felt unusual stress, anger or loneliness in the last month? No   Who do you live with? Spouse   If you need help, do you have trouble finding someone available to you? No   Have you been bothered in the last four weeks by sexual problems? No   Do you have difficulty concentrating, remembering or making decisions? No       Age-appropriate Screening Schedule:  Refer to the list below for future screening recommendations based on patient's age, sex and/or medical conditions. Orders for these recommended tests are listed in the plan section. The patient has been provided with a written plan.    Health Maintenance   Topic Date Due   • TDAP/TD VACCINES (2 - Tdap) 08/16/2006   • DIABETIC FOOT EXAM  Never done   • DIABETIC EYE EXAM  Never done   • INFLUENZA VACCINE  08/01/2022   • HEMOGLOBIN A1C  05/02/2023   • LIPID PANEL  11/02/2023   • URINE MICROALBUMIN  11/02/2023              Assessment & Plan   CMS Preventative Services Quick Reference  Risk Factors Identified During Encounter  Cardiovascular Disease  Obesity/Overweight   Polypharmacy  The above risks/problems have been discussed with the patient.  Follow up actions/plans if indicated are seen below in the Assessment/Plan Section.  Pertinent information has been shared with the patient in the After Visit Summary.    There are no diagnoses linked to this encounter.    Follow Up:   Return if symptoms worsen or fail to improve.     An After Visit Summary and PPPS were made available to the patient.

## 2022-12-07 ENCOUNTER — OFFICE VISIT (OUTPATIENT)
Dept: PODIATRY | Facility: CLINIC | Age: 47
End: 2022-12-07

## 2022-12-07 VITALS
OXYGEN SATURATION: 97 % | HEART RATE: 80 BPM | BODY MASS INDEX: 39.68 KG/M2 | DIASTOLIC BLOOD PRESSURE: 92 MMHG | HEIGHT: 72 IN | SYSTOLIC BLOOD PRESSURE: 158 MMHG | WEIGHT: 293 LBS

## 2022-12-07 DIAGNOSIS — E11.42 TYPE 2 DIABETES MELLITUS WITH DIABETIC POLYNEUROPATHY, WITH LONG-TERM CURRENT USE OF INSULIN: ICD-10-CM

## 2022-12-07 DIAGNOSIS — Z79.4 TYPE 2 DIABETES MELLITUS WITH DIABETIC POLYNEUROPATHY, WITH LONG-TERM CURRENT USE OF INSULIN: ICD-10-CM

## 2022-12-07 DIAGNOSIS — M14.679 CHARCOT ARTHROPATHY OF MIDFOOT: Primary | ICD-10-CM

## 2022-12-07 DIAGNOSIS — I89.0 LYMPHEDEMA: ICD-10-CM

## 2022-12-07 PROCEDURE — 99213 OFFICE O/P EST LOW 20 MIN: CPT | Performed by: PODIATRIST

## 2022-12-07 NOTE — PROGRESS NOTES
Taylor Le  1975  47 y.o. male      12/07/2022      History of Present Illness    Taylor Le is a 47 y.o.male presents to clinic today for follow-up.      Past Medical History:   Diagnosis Date   • Diabetes mellitus (HCC)          Past Surgical History:   Procedure Laterality Date   • APPENDECTOMY     • EYE SURGERY           Family History   Problem Relation Age of Onset   • Heart disease Mother    • Diabetes Mother    • Diabetes Father    • Diabetes Sister    • Hypertension Sister    • Diabetes Maternal Grandmother    • Heart disease Maternal Grandfather    • Diabetes Paternal Grandmother    • Cancer Paternal Grandfather        Allergies   Allergen Reactions   • Phenobarbital Seizure       Social History     Socioeconomic History   • Marital status:    Tobacco Use   • Smoking status: Never   • Smokeless tobacco: Never   Vaping Use   • Vaping Use: Never used   Substance and Sexual Activity   • Alcohol use: Never   • Drug use: Never   • Sexual activity: Yes         Current Outpatient Medications   Medication Sig Dispense Refill   • ascorbic acid (VITAMIN C) 500 MG tablet Take 500 mg by mouth Daily.     • aspirin 81 MG chewable tablet Chew 81 mg Daily.     • cephalexin (KEFLEX) 500 MG capsule Take 1 capsule by mouth 4 (Four) Times a Day. 40 capsule 0   • Continuous Blood Gluc  (Dexcom G6 ) device 1 each 1 (One) Time As Needed (one device a year) for up to 1 dose. 1 each 0   • Continuous Blood Gluc  (FreeStyle Sander 14 Day [place]) device 1 Device As Needed (Check blood sugar 4 times a day and  PRN). 1 each 0   • Continuous Blood Gluc Sensor (Dexcom G6 Sensor) Every 10 (Ten) Days. 3 each 3   • Continuous Blood Gluc Sensor (FreeStyle Sander 14 Day Sensor) misc 1 each Every 14 (Fourteen) Days. 3 each 11   • Continuous Blood Gluc Transmit (Dexcom G6 Transmitter) misc 1 each Every 3 (Three) Months. 1 each 3   • furosemide (Lasix) 20 MG tablet Take 1 tablet by mouth 2 (Two) Times a Day.  "60 tablet 3   • furosemide (LASIX) 20 MG tablet Take 1 tablet by mouth Daily. 20 tablet 0   • Glucagon (Gvoke HypoPen 2-Pack) 1 MG/0.2ML solution auto-injector Inject 1 mg under the skin into the appropriate area as directed As Needed (for hypoglycemia). 0.4 mL 1   • Insulin Aspart (novoLOG) 100 UNIT/ML injection 100 units per day via insulin pump 30 mL 6   • Insulin Glargine (LANTUS SOLOSTAR) 100 UNIT/ML injection pen Inject 30 Units under the skin into the appropriate area as directed 2 (Two) Times a Day. 10 pen 11   • lisinopril-hydrochlorothiazide (PRINZIDE,ZESTORETIC) 20-25 MG per tablet Take 1 tablet by mouth Daily. 30 tablet 11   • rosuvastatin (Crestor) 10 MG tablet Take 1 tablet by mouth Every Night. 30 tablet 11     No current facility-administered medications for this visit.       Review of Systems   Musculoskeletal:        Foot pain   All other systems reviewed and are negative.        OBJECTIVE    /92   Pulse 80   Ht 182.9 cm (72\")   Wt 133 kg (293 lb)   SpO2 97%   BMI 39.74 kg/m²     Physical Exam  Vitals reviewed.   Constitutional:       General: He is not in acute distress.     Appearance: He is well-developed.   HENT:      Head: Normocephalic and atraumatic.      Nose: Nose normal.   Eyes:      Conjunctiva/sclera: Conjunctivae normal.      Pupils: Pupils are equal, round, and reactive to light.   Cardiovascular:      Pulses:           Dorsalis pedis pulses are 1+ on the right side.        Posterior tibial pulses are 1+ on the right side.   Pulmonary:      Effort: Pulmonary effort is normal. No respiratory distress.      Breath sounds: No wheezing.   Musculoskeletal:      Right lower leg: 3+ Edema present.      Right foot: Deformity and Charcot foot present.   Feet:      Right foot:      Skin integrity: Skin integrity normal.      Comments: Unstable right midfoot  Skin:     General: Skin is warm and dry.      Capillary Refill: Capillary refill takes less than 2 seconds.   Neurological:     "  Mental Status: He is alert and oriented to person, place, and time.   Psychiatric:         Behavior: Behavior normal.         Thought Content: Thought content normal.                Procedures        ASSESSMENT AND PLAN    Diagnoses and all orders for this visit:    1. Charcot arthropathy of midfoot (Primary)    2. Type 2 diabetes mellitus with diabetic polyneuropathy, with long-term current use of insulin (HCC)    3. Lymphedema  -     Ambulatory Referral to Wound Clinic        -Imaging reviewed with the patient.  Lengthy discussion held patient regarding treatment going forward.  Will attempt to get lymphedema under control prior to scheduling surgical Charcot reconstruction.  Patient verbalized understanding.  Referral to wound center to be evaluated for lymphedema pumps.          This document has been electronically signed by Quentin Ceballos DPM on December 11, 2022 14:00 CST     12/11/2022  14:00 CST

## 2022-12-14 ENCOUNTER — OFFICE VISIT (OUTPATIENT)
Dept: WOUND CARE | Facility: HOSPITAL | Age: 47
End: 2022-12-14

## 2022-12-14 PROCEDURE — G0463 HOSPITAL OUTPT CLINIC VISIT: HCPCS

## 2022-12-19 ENCOUNTER — OFFICE VISIT (OUTPATIENT)
Dept: CARDIAC SURGERY | Facility: CLINIC | Age: 47
End: 2022-12-19

## 2022-12-19 VITALS
HEART RATE: 89 BPM | BODY MASS INDEX: 39.68 KG/M2 | DIASTOLIC BLOOD PRESSURE: 80 MMHG | SYSTOLIC BLOOD PRESSURE: 144 MMHG | OXYGEN SATURATION: 98 % | WEIGHT: 293 LBS | HEIGHT: 72 IN

## 2022-12-19 DIAGNOSIS — Z96.41 DIABETES MELLITUS TYPE 2, WITH COMPLICATION, ON LONG TERM INSULIN PUMP: ICD-10-CM

## 2022-12-19 DIAGNOSIS — E11.8 DIABETES MELLITUS TYPE 2, WITH COMPLICATION, ON LONG TERM INSULIN PUMP: ICD-10-CM

## 2022-12-19 DIAGNOSIS — R06.01 ORTHOPNEA: Primary | ICD-10-CM

## 2022-12-19 DIAGNOSIS — E78.2 MIXED HYPERLIPIDEMIA: ICD-10-CM

## 2022-12-19 DIAGNOSIS — E66.9 CLASS II OBESITY: ICD-10-CM

## 2022-12-19 DIAGNOSIS — I89.0 LYMPHEDEMA: ICD-10-CM

## 2022-12-19 PROBLEM — I10 ESSENTIAL HYPERTENSION: Status: ACTIVE | Noted: 2022-12-19

## 2022-12-19 PROBLEM — R60.0 LEG EDEMA: Status: ACTIVE | Noted: 2022-12-19

## 2022-12-19 PROCEDURE — 99214 OFFICE O/P EST MOD 30 MIN: CPT | Performed by: NURSE PRACTITIONER

## 2022-12-21 ENCOUNTER — OFFICE VISIT (OUTPATIENT)
Dept: WOUND CARE | Facility: HOSPITAL | Age: 47
End: 2022-12-21

## 2022-12-21 PROCEDURE — G0463 HOSPITAL OUTPT CLINIC VISIT: HCPCS

## 2022-12-27 DIAGNOSIS — I51.7 VENTRICULAR HYPERTROPHY: Primary | ICD-10-CM

## 2022-12-27 DIAGNOSIS — R06.01 ORTHOPNEA: Primary | ICD-10-CM

## 2023-01-03 ENCOUNTER — OFFICE VISIT (OUTPATIENT)
Dept: PODIATRY | Facility: CLINIC | Age: 48
End: 2023-01-03
Payer: MEDICARE

## 2023-01-03 ENCOUNTER — LAB (OUTPATIENT)
Dept: LAB | Facility: HOSPITAL | Age: 48
End: 2023-01-03
Payer: MEDICARE

## 2023-01-03 VITALS — OXYGEN SATURATION: 97 % | WEIGHT: 293 LBS | HEART RATE: 78 BPM | HEIGHT: 72 IN | BODY MASS INDEX: 39.68 KG/M2

## 2023-01-03 DIAGNOSIS — R06.01 ORTHOPNEA: ICD-10-CM

## 2023-01-03 DIAGNOSIS — M14.679 CHARCOT ARTHROPATHY OF MIDFOOT: Primary | ICD-10-CM

## 2023-01-03 DIAGNOSIS — Z79.4 TYPE 2 DIABETES MELLITUS WITH DIABETIC POLYNEUROPATHY, WITH LONG-TERM CURRENT USE OF INSULIN: ICD-10-CM

## 2023-01-03 DIAGNOSIS — E11.42 TYPE 2 DIABETES MELLITUS WITH DIABETIC POLYNEUROPATHY, WITH LONG-TERM CURRENT USE OF INSULIN: ICD-10-CM

## 2023-01-03 LAB — NT-PROBNP SERPL-MCNC: 33.4 PG/ML (ref 0–450)

## 2023-01-03 PROCEDURE — 1160F RVW MEDS BY RX/DR IN RCRD: CPT | Performed by: PODIATRIST

## 2023-01-03 PROCEDURE — 36415 COLL VENOUS BLD VENIPUNCTURE: CPT

## 2023-01-03 PROCEDURE — 99213 OFFICE O/P EST LOW 20 MIN: CPT | Performed by: PODIATRIST

## 2023-01-03 PROCEDURE — 1159F MED LIST DOCD IN RCRD: CPT | Performed by: PODIATRIST

## 2023-01-03 PROCEDURE — 83880 ASSAY OF NATRIURETIC PEPTIDE: CPT

## 2023-01-03 NOTE — PROGRESS NOTES
Taylor Le  1975  47 y.o. male   PCP: Rachel Sue 12/05/2022  BS: 140 per pt      01/03/2023      History of Present Illness    Taylor Le is a 47 y.o.male presents to clinic today for follow-up on his right foot.      Past Medical History:   Diagnosis Date   • Diabetes mellitus (HCC)          Past Surgical History:   Procedure Laterality Date   • APPENDECTOMY     • EYE SURGERY           Family History   Problem Relation Age of Onset   • Heart disease Mother    • Diabetes Mother    • Diabetes Father    • Diabetes Sister    • Hypertension Sister    • Diabetes Maternal Grandmother    • Heart disease Maternal Grandfather    • Diabetes Paternal Grandmother    • Cancer Paternal Grandfather        Allergies   Allergen Reactions   • Phenobarbital Seizure       Social History     Socioeconomic History   • Marital status:    Tobacco Use   • Smoking status: Never   • Smokeless tobacco: Never   Vaping Use   • Vaping Use: Never used   Substance and Sexual Activity   • Alcohol use: Never   • Drug use: Never   • Sexual activity: Yes         Current Outpatient Medications   Medication Sig Dispense Refill   • ascorbic acid (VITAMIN C) 500 MG tablet Take 500 mg by mouth Daily.     • aspirin 81 MG chewable tablet Chew 81 mg Daily.     • Continuous Blood Gluc  (Dexcom G6 ) device 1 each 1 (One) Time As Needed (one device a year) for up to 1 dose. 1 each 0   • Continuous Blood Gluc  (FreeStyle Sander 14 Day [place]) device 1 Device As Needed (Check blood sugar 4 times a day and  PRN). 1 each 0   • Continuous Blood Gluc Sensor (Dexcom G6 Sensor) Every 10 (Ten) Days. 3 each 3   • Continuous Blood Gluc Sensor (FreeStyle Sander 14 Day Sensor) misc 1 each Every 14 (Fourteen) Days. 3 each 11   • Continuous Blood Gluc Transmit (Dexcom G6 Transmitter) misc 1 each Every 3 (Three) Months. 1 each 3   • furosemide (Lasix) 20 MG tablet Take 1 tablet by mouth 2 (Two) Times a Day. 60 tablet 3   • Glucagon (Gvoke  HypoPen 2-Pack) 1 MG/0.2ML solution auto-injector Inject 1 mg under the skin into the appropriate area as directed As Needed (for hypoglycemia). 0.4 mL 1   • Insulin Aspart (novoLOG) 100 UNIT/ML injection 100 units per day via insulin pump 30 mL 6   • Insulin Glargine (LANTUS SOLOSTAR) 100 UNIT/ML injection pen Inject 30 Units under the skin into the appropriate area as directed 2 (Two) Times a Day. 10 pen 11   • lisinopril-hydrochlorothiazide (PRINZIDE,ZESTORETIC) 20-25 MG per tablet Take 1 tablet by mouth Daily. 30 tablet 11   • rosuvastatin (Crestor) 10 MG tablet Take 1 tablet by mouth Every Night. 30 tablet 11     No current facility-administered medications for this visit.       Review of Systems   Musculoskeletal:        Foot pain   All other systems reviewed and are negative.        OBJECTIVE    Pulse 78   Ht 182.9 cm (72.01\")   Wt 133 kg (293 lb)   SpO2 97%   BMI 39.73 kg/m²     Physical Exam  Vitals reviewed.   Constitutional:       General: He is not in acute distress.     Appearance: He is well-developed.   HENT:      Head: Normocephalic and atraumatic.      Nose: Nose normal.   Eyes:      Conjunctiva/sclera: Conjunctivae normal.      Pupils: Pupils are equal, round, and reactive to light.   Cardiovascular:      Pulses:           Dorsalis pedis pulses are 1+ on the right side.        Posterior tibial pulses are 1+ on the right side.   Pulmonary:      Effort: Pulmonary effort is normal. No respiratory distress.      Breath sounds: No wheezing.   Musculoskeletal:      Right lower leg: 3+ Edema present.      Right foot: Deformity and Charcot foot present.   Feet:      Right foot:      Skin integrity: Skin integrity normal.      Comments: Unstable right midfoot  Skin:     General: Skin is warm and dry.      Capillary Refill: Capillary refill takes less than 2 seconds.   Neurological:      Mental Status: He is alert and oriented to person, place, and time.   Psychiatric:         Behavior: Behavior normal.          Thought Content: Thought content normal.                Procedures        ASSESSMENT AND PLAN    Diagnoses and all orders for this visit:    1. Charcot arthropathy of midfoot (Primary)    2. Type 2 diabetes mellitus with diabetic polyneuropathy, with long-term current use of insulin (HCC)        -Patient has received his lymphedema pumps.  He has not started using them just yet.  Swelling in his right lower extremity has improved.  Midfoot remains unstable.  Discussed conservative and surgical approaches.  Patient to recheck in 4 weeks and possible scheduling of surgery          This document has been electronically signed by Quentin Ceballos DPM on January 3, 2023 10:10 CST     1/3/2023  10:10 CST

## 2023-01-09 ENCOUNTER — OFFICE VISIT (OUTPATIENT)
Dept: CARDIOLOGY | Facility: CLINIC | Age: 48
End: 2023-01-09
Payer: MEDICARE

## 2023-01-09 VITALS
WEIGHT: 306 LBS | HEART RATE: 68 BPM | OXYGEN SATURATION: 99 % | SYSTOLIC BLOOD PRESSURE: 138 MMHG | BODY MASS INDEX: 41.45 KG/M2 | HEIGHT: 72 IN | DIASTOLIC BLOOD PRESSURE: 78 MMHG

## 2023-01-09 DIAGNOSIS — M79.89 LEG SWELLING: Primary | ICD-10-CM

## 2023-01-09 DIAGNOSIS — I89.0 LYMPHEDEMA: ICD-10-CM

## 2023-01-09 DIAGNOSIS — E66.9 CLASS II OBESITY: ICD-10-CM

## 2023-01-09 LAB
QT INTERVAL: 368 MS
QTC INTERVAL: 391 MS

## 2023-01-09 PROCEDURE — 99214 OFFICE O/P EST MOD 30 MIN: CPT | Performed by: NURSE PRACTITIONER

## 2023-01-09 PROCEDURE — 93000 ELECTROCARDIOGRAM COMPLETE: CPT | Performed by: INTERNAL MEDICINE

## 2023-01-09 RX ORDER — FUROSEMIDE 40 MG/1
40 TABLET ORAL DAILY
Qty: 90 TABLET | Refills: 0 | Status: SHIPPED | OUTPATIENT
Start: 2023-01-09

## 2023-01-09 NOTE — PROGRESS NOTES
Leg Swelling (Chief Complaint )      History of Present Illness    Mr. Taylor Le is a very pleasant 47-year-old  male with medical history of hypertension, hyperlipidemia, diabetes, obesity, neuropathy who was referred to us from [facility] after he was evaluated there for bilateral lower extremity edema and possible lymphedema.  According to patient, swelling been ongoing for the past year however worsening for the past 6 months.  Severity has worsened and is now present throughout the whole day. Does improve some at night. He works from home and sits a lot for his job. Denies orthopnea, PND, SOA, palpitations, chest pain, dizziness or syncope.  He does have Charcot arthropathy and is in a walking boot/scooter.  He has been on Lasix 20 mg twice daily since August.  There is some concern that his leg swelling could be CHF.    Patient denies any previous cardiac history or previous cardiac work-up.  EKG normal in office today with no acute ST-T wave changes in sinus rhythm.  proBNP normal.  Chest x-ray showing no acute changes some mild venous congestion, no effusions.  Venous duplex showing limited study due to edema. Negative for DT, deep venous valvular incompetence to bilateral common femoral.  Echocardiogram showing normal biventricular function, right ventricle is borderline dilated.      The 10-year ASCVD risk score (Harvinder DK, et al., 2019) is: 4%    Values used to calculate the score:      Age: 47 years      Sex: Male      Is Non- : No      Diabetic: Yes      Tobacco smoker: No      Systolic Blood Pressure: 138 mmHg      Is BP treated: Yes      HDL Cholesterol: 48 mg/dL      Total Cholesterol: 167 mg/dL          Past Medical History:   Diagnosis Date   • Diabetes mellitus (HCC)      Past Surgical History:   Procedure Laterality Date   • APPENDECTOMY     • EYE SURGERY       Social History     Socioeconomic History   • Marital status:    Tobacco Use    • Smoking status: Never   • Smokeless tobacco: Never   Vaping Use   • Vaping Use: Never used   Substance and Sexual Activity   • Alcohol use: Never   • Drug use: Never   • Sexual activity: Yes     Family History   Problem Relation Age of Onset   • Heart disease Mother    • Diabetes Mother    • Diabetes Father    • Diabetes Sister    • Hypertension Sister    • Diabetes Maternal Grandmother    • Heart disease Maternal Grandfather    • Diabetes Paternal Grandmother    • Cancer Paternal Grandfather        ALLERGIES:  Allergies   Allergen Reactions   • Phenobarbital Seizure         Review of Systems   Constitutional: Negative for chills, fever, malaise/fatigue and weight gain.   HENT: Negative for nosebleeds and tinnitus.    Eyes: Negative for blurred vision and double vision.   Cardiovascular: Positive for leg swelling. Negative for chest pain, dyspnea on exertion, irregular heartbeat, palpitations and syncope.   Respiratory: Negative for cough, shortness of breath, sleep disturbances due to breathing and snoring.    Endocrine: Negative for polydipsia, polyphagia and polyuria.   Hematologic/Lymphatic: Negative for bleeding problem. Does not bruise/bleed easily.   Skin: Negative for color change and suspicious lesions.   Musculoskeletal: Negative for falls and myalgias.   Gastrointestinal: Negative for bloating, heartburn and hematochezia.   Genitourinary: Negative for dysuria and hematuria.   Neurological: Negative for dizziness, headaches, seizures, vertigo and weakness.   Psychiatric/Behavioral: Negative for altered mental status and depression. The patient does not have insomnia and is not nervous/anxious.    Allergic/Immunologic: Negative for environmental allergies and persistent infections.       Current Outpatient Medications   Medication Sig Dispense Refill   • ascorbic acid (VITAMIN C) 500 MG tablet Take 500 mg by mouth Daily.     • aspirin 81 MG chewable tablet Chew 81 mg Daily.     • Continuous Blood Gluc   (Dexcom G6 ) device 1 each 1 (One) Time As Needed (one device a year) for up to 1 dose. 1 each 0   • Continuous Blood Gluc  (FreeStyle Sander 14 Day [place]) device 1 Device As Needed (Check blood sugar 4 times a day and  PRN). 1 each 0   • Continuous Blood Gluc Sensor (Dexcom G6 Sensor) Every 10 (Ten) Days. 3 each 3   • Continuous Blood Gluc Sensor (FreeStyle Sander 14 Day Sensor) misc 1 each Every 14 (Fourteen) Days. 3 each 11   • Continuous Blood Gluc Transmit (Dexcom G6 Transmitter) misc 1 each Every 3 (Three) Months. 1 each 3   • furosemide (Lasix) 40 MG tablet Take 1 tablet by mouth Daily. 90 tablet 0   • Glucagon (Gvoke HypoPen 2-Pack) 1 MG/0.2ML solution auto-injector Inject 1 mg under the skin into the appropriate area as directed As Needed (for hypoglycemia). 0.4 mL 1   • Insulin Aspart (novoLOG) 100 UNIT/ML injection 100 units per day via insulin pump 30 mL 6   • Insulin Glargine (LANTUS SOLOSTAR) 100 UNIT/ML injection pen Inject 30 Units under the skin into the appropriate area as directed 2 (Two) Times a Day. 10 pen 11   • lisinopril-hydrochlorothiazide (PRINZIDE,ZESTORETIC) 20-25 MG per tablet Take 1 tablet by mouth Daily. 30 tablet 11   • rosuvastatin (Crestor) 10 MG tablet Take 1 tablet by mouth Every Night. 30 tablet 11     No current facility-administered medications for this visit.       OBJECTIVE:    Physical Exam:   Vitals reviewed.   Constitutional:       General: Not in acute distress.     Appearance: Normal appearance. Well-developed. Not toxic-appearing or diaphoretic.   Eyes:      General: Lids are normal.      Conjunctiva/sclera: Conjunctivae normal.   HENT:      Head: Normocephalic and atraumatic.      Right Ear: External ear normal.      Left Ear: External ear normal.   Neck:      Vascular: No JVD.   Pulmonary:      Effort: Pulmonary effort is normal. No respiratory distress.      Breath sounds: Normal breath sounds. No decreased breath sounds. No wheezing. No  rales.   Chest:      Chest wall: Not tender to palpatation.   Cardiovascular:      PMI at left midclavicular line. Normal rate. Regular rhythm. Normal S1 with normal intensity. Normal S2 with normal intensity.      Murmurs: There is no murmur.      No gallop. No S3 and S4 gallop. No click. No rub.   Pulses:     Intact distal pulses. No decreased pulses.   Edema:     Pretibial: bilateral 2+ edema of the pretibial area.     Ankle: bilateral 2+ edema of the ankle.     Feet: bilateral 2+ edema of the feet.  Abdominal:      General: Bowel sounds are normal. There is no distension.      Palpations: Abdomen is soft.      Tenderness: There is no abdominal tenderness.   Musculoskeletal:      Cervical back: Normal range of motion and neck supple. Skin:     General: Skin is warm and dry.      Coloration: Skin is not pale.      Findings: No erythema or rash.        Neurological:      Mental Status: Alert and oriented to person, place, and time.      Gait: Gait normal.   Psychiatric:         Behavior: Behavior normal.         Thought Content: Thought content normal.         Judgment: Judgment normal.       Vitals:    01/09/23 1403   BP: 138/78   BP Location: Left arm   Patient Position: Sitting   Cuff Size: Adult   Pulse: 68   SpO2: 99%   Weight: (!) 139 kg (306 lb)   Height: 182.9 cm (72\")       DATA REVIEWED:   Results for orders placed in visit on 12/22/22    Adult Transthoracic Echo Complete W/ Cont if Necessary Per Protocol    Interpretation Summary  •  Left ventricular ejection fraction appears to be 61 - 65%.  •  The right ventricular cavity is borderline dilated.  •  Estimated right ventricular systolic pressure from tricuspid regurgitation is normal (<35 mmHg).  •  No significant valve abnormalities      No radiology results for the last 30 days.       Labs: BMP, CBC, LIPID, TSH  Lab Results   Component Value Date    GLUCOSE 117 (H) 11/17/2022    CALCIUM 9.6 11/17/2022     (L) 11/17/2022    K 4.3 11/17/2022    CO2  28.0 11/17/2022    CL 96 (L) 11/17/2022    BUN 19 11/17/2022    CREATININE 1.07 11/17/2022    EGFRIFNONA 91 12/22/2021    BCR 17.8 11/17/2022    ANIONGAP 10.0 11/17/2022     Lab Results   Component Value Date    WBC 8.92 11/17/2022    HGB 12.0 (L) 11/17/2022    HCT 37.0 (L) 11/17/2022    MCV 85.1 11/17/2022     11/17/2022     Lab Results   Component Value Date    CHOL 167 11/02/2022    CHOL 178 12/22/2021     Lab Results   Component Value Date    TRIG 70 11/02/2022    TRIG 63 12/22/2021    TRIG 104 05/15/2020     Lab Results   Component Value Date    HDL 48 11/02/2022    HDL 41 12/22/2021    HDL 44 05/15/2020     No components found for: LDLCALC  Lab Results   Component Value Date     (H) 11/02/2022     (H) 12/22/2021     (H) 05/15/2020     No results found for: HDLLDLRATIO  No components found for: CHOLHDL  Lab Results   Component Value Date    TSH 3.450 11/02/2022     Lab Results   Component Value Date    PROBNP 33.4 01/03/2023     EKG:           The following portions of the patient's history were reviewed and updated as appropriate: allergies, current medications, past family history, past medical history, past social history, past surgical history and problem list.  Old records reviewed and pertinent information is included in the above objective data.     ASSESSMENT/PLAN:       Diagnosis Plan   1. Leg swelling  ECG 12 Lead    furosemide (Lasix) 40 MG tablet      2. Lymphedema        3. Class II obesity          #1. LE Edema:/#2. Lymphedema There is no overt conclusive evidence of CHF upon exam today. ProBNP is normal. Echocardiogram relatively benign with normal EF, right ventricle is borderline dilated.  We discussed the possibility of referral to sleep medicine. He denies any orthopnea, SOA or other symptoms concerning for CHF.     -Recommend continuing with the legs pumps and compression therapy.   -Change lasix from 20mg BID to 40mg daily  -We discussed weight loss and  ambulating to help circulation  -Educated on decreasing sodium intake in diet        Class 3 Severe Obesity (BMI >=40). Obesity-related health conditions include the following: hypertension, diabetes mellitus and dyslipidemias. Obesity is unchanged. BMI is is above average; BMI management plan is completed. We discussed portion control and increasing exercise.      I spent 37 minutes caring for Taylor on this date of service. This time includes time spent by me in the following activities: preparing for the visit, reviewing tests, obtaining and/or reviewing a separately obtained history, performing a medically appropriate examination and/or evaluation, counseling and educating the patient/family/caregiver, ordering medications, tests, or procedures and documenting information in the medical record    Follow up: 1 month            This document has been electronically signed by TRISHA Kerr on January 9, 2023 16:43 CST

## 2023-01-11 ENCOUNTER — PATIENT ROUNDING (BHMG ONLY) (OUTPATIENT)
Dept: CARDIOLOGY | Facility: CLINIC | Age: 48
End: 2023-01-11
Payer: MEDICARE

## 2023-01-11 NOTE — PROGRESS NOTES
January 11, 2023    Hello, may I speak with Taylor Le?    My name is CLINT Murray      I am  with [facility] CARDIOLOG[facility]  [address] [place] [address] [phone].    Before we get started may I verify your date of birth? 1975    I am calling to officially welcome you to our practice and ask about your recent visit. Is this a good time to talk? yes    Tell me about your visit with us. What things went well?  She was pleasant. Everything went well.        We're always looking for ways to make our patients' experiences even better. Do you have recommendations on ways we may improve?  no    Overall were you satisfied with your first visit to our practice? yes       I appreciate you taking the time to speak with me today. Is there anything else I can do for you? no      Thank you, and have a great day.

## 2023-01-30 DIAGNOSIS — I10 PRIMARY HYPERTENSION: ICD-10-CM

## 2023-01-30 RX ORDER — LISINOPRIL AND HYDROCHLOROTHIAZIDE 25; 20 MG/1; MG/1
1 TABLET ORAL DAILY
Qty: 30 TABLET | Refills: 11 | Status: SHIPPED | OUTPATIENT
Start: 2023-01-30

## 2023-02-15 ENCOUNTER — OFFICE VISIT (OUTPATIENT)
Dept: ENDOCRINOLOGY | Facility: CLINIC | Age: 48
End: 2023-02-15
Payer: MEDICARE

## 2023-02-15 VITALS
DIASTOLIC BLOOD PRESSURE: 70 MMHG | SYSTOLIC BLOOD PRESSURE: 136 MMHG | HEIGHT: 72 IN | WEIGHT: 306 LBS | BODY MASS INDEX: 41.45 KG/M2 | OXYGEN SATURATION: 98 % | HEART RATE: 79 BPM

## 2023-02-15 DIAGNOSIS — E10.42 DIABETIC POLYNEUROPATHY ASSOCIATED WITH TYPE 1 DIABETES MELLITUS: ICD-10-CM

## 2023-02-15 DIAGNOSIS — E10.9 WELL CONTROLLED TYPE 1 DIABETES MELLITUS: Primary | ICD-10-CM

## 2023-02-15 DIAGNOSIS — E10.69 MIXED DIABETIC HYPERLIPIDEMIA ASSOCIATED WITH TYPE 1 DIABETES MELLITUS: ICD-10-CM

## 2023-02-15 DIAGNOSIS — I10 PRIMARY HYPERTENSION: ICD-10-CM

## 2023-02-15 DIAGNOSIS — E78.2 MIXED DIABETIC HYPERLIPIDEMIA ASSOCIATED WITH TYPE 1 DIABETES MELLITUS: ICD-10-CM

## 2023-02-15 LAB — HBA1C MFR BLD: 6.2 %

## 2023-02-15 PROCEDURE — 99214 OFFICE O/P EST MOD 30 MIN: CPT | Performed by: INTERNAL MEDICINE

## 2023-02-15 PROCEDURE — 3044F HG A1C LEVEL LT 7.0%: CPT | Performed by: INTERNAL MEDICINE

## 2023-02-15 PROCEDURE — 95251 CONT GLUC MNTR ANALYSIS I&R: CPT | Performed by: INTERNAL MEDICINE

## 2023-02-15 RX ORDER — MULTIPLE VITAMINS W/ MINERALS TAB 9MG-400MCG
1 TAB ORAL DAILY
COMMUNITY

## 2023-02-15 NOTE — PROGRESS NOTES
"CC, Type 1 DM                                 History of Present Illness    47 y.o. male     Diabetes Type 1    Duration - since age 15 years old    Complications -    Neuropathy , charcot   Retinopathy , cataracts, vision loss    Present Monitoring -      Fingersticks -     CGM - tandem , dexcom     Present Regimen -  Basal, bolus insulin    Carb Intake -   Counts carbs     Exercise -   None    Symptoms -     Numbness, tingling   ==========================================  PE    /70   Pulse 79   Ht 182.9 cm (72\")   Wt (!) 139 kg (306 lb)   SpO2 98%   BMI 41.50 kg/m²   AOx3  No visible goiter  Normal Respiratory Effort   Lymphedema  Right Charcot       Labs    Lab Results   Component Value Date    WBC 8.92 11/17/2022    HGB 12.0 (L) 11/17/2022    HCT 37.0 (L) 11/17/2022    MCV 85.1 11/17/2022     11/17/2022     Lab Results   Component Value Date    GLUCOSE 117 (H) 11/17/2022    BUN 19 11/17/2022    CREATININE 1.07 11/17/2022    EGFRIFNONA 91 12/22/2021    BCR 17.8 11/17/2022     (L) 11/17/2022    K 4.3 11/17/2022    CO2 28.0 11/17/2022    CALCIUM 9.6 11/17/2022    ALBUMIN 4.00 11/17/2022    LABIL2 1.6 07/19/2021    AST 29 11/17/2022    ALT 44 (H) 11/17/2022                 ==========================================      ICD-10-CM ICD-9-CM   1. Well controlled type 1 diabetes mellitus (HCC)  E10.9 250.01   2. Mixed diabetic hyperlipidemia associated with type 1 diabetes mellitus (HCC)  E10.69 250.81    E78.2 272.2   3. Primary hypertension  I10 401.9   4. Diabetic polyneuropathy associated with type 1 diabetes mellitus (HCC)  E10.42 250.61     357.2       Diabetes Mellitus    --------------------------------------------------------------------------------------------------------------------------------------------    Glycemic Management   Lab Results   Component Value Date    HGBA1C 6.2 02/15/2023     Tandem , dexcom     2 week review Feb 2 to 15, 2023   80% in range   2% low   Control IQ 7%   " "    Tandem     Basal 1.83  Correction 20   Carb ratio 5           Glucagon  rx    n    ==========================================================================  Microvascular Complication Monitoring    Neuropathy                       Yes, Charcot   Retinopathy    yes   Renal     GFR   Lab Results   Component Value Date    EGFR 87.2 11/17/2022    EGFR 84.4 11/02/2022    EGFR 88.7 04/12/2022       Albuminuria   Lab Results   Component Value Date    MALBCRERATIO  11/02/2022      Comment:      Unable to calculate            ==========================================================================    Lipids  Lab Results   Component Value Date    CHOL 167 11/02/2022    CHLPL 199 05/15/2020    TRIG 70 11/02/2022    HDL 48 11/02/2022     (H) 11/02/2022       Statin  crestor 10 mg qhs     ==========================================================================    HTN  /70   Pulse 79   Ht 182.9 cm (72\")   Wt (!) 139 kg (306 lb)   SpO2 98%   BMI 41.50 kg/m²     Lisinopril - hctz   Lasix       ==========================================================================    Bone Health    Vit D    No results found for: [id number]    Calcium intake     ==========================================================================    Thyroid Assessment  Lab Results   Component Value Date    TSH 3.450 11/02/2022           ==========================================================================    Vitamin B12  Lab Results   Component Value Date    [id number] 575 11/02/2022         ==========================================================================    Celiac Panel     Pending     Sleep apnea     refer to sleep medicine     Orders Placed This Encounter   Procedures   • Hemoglobin A1c     This order was created through External Result Entry     Order Specific Question:   Release to patient     Answer:   Routine Release   • CBC Auto Differential     Standing Status:   Future     Standing Expiration Date:   " 2/15/2024     Order Specific Question:   Release to patient     Answer:   Routine Release   • Comprehensive Metabolic Panel     Standing Status:   Future     Standing Expiration Date:   2/15/2024     Order Specific Question:   Release to patient     Answer:   Routine Release   • Hemoglobin A1c     Standing Status:   Future     Standing Expiration Date:   2/15/2024     Order Specific Question:   Release to patient     Answer:   Routine Release   • Lipid Panel     Standing Status:   Future     Standing Expiration Date:   2/15/2024   • Microalbumin / Creatinine Urine Ratio - Urine, Clean Catch     Standing Status:   Future     Standing Expiration Date:   2/15/2024     Order Specific Question:   Release to patient     Answer:   Routine Release   • TSH     Standing Status:   Future     Standing Expiration Date:   2/15/2024     Order Specific Question:   Release to patient     Answer:   Routine Release   • Vitamin B12     Standing Status:   Future     Standing Expiration Date:   2/15/2024     Order Specific Question:   Release to patient     Answer:   Routine Release                This document has been electronically signed by Bernabe Juarez MD on February 15, 2023 16:51 CST

## 2023-03-08 ENCOUNTER — OFFICE VISIT (OUTPATIENT)
Dept: PODIATRY | Facility: CLINIC | Age: 48
End: 2023-03-08
Payer: MEDICARE

## 2023-03-08 VITALS
HEIGHT: 72 IN | DIASTOLIC BLOOD PRESSURE: 79 MMHG | BODY MASS INDEX: 41.45 KG/M2 | HEART RATE: 71 BPM | OXYGEN SATURATION: 96 % | WEIGHT: 306 LBS | SYSTOLIC BLOOD PRESSURE: 124 MMHG

## 2023-03-08 DIAGNOSIS — E11.42 TYPE 2 DIABETES MELLITUS WITH DIABETIC POLYNEUROPATHY, WITH LONG-TERM CURRENT USE OF INSULIN: ICD-10-CM

## 2023-03-08 DIAGNOSIS — Z79.4 TYPE 2 DIABETES MELLITUS WITH DIABETIC POLYNEUROPATHY, WITH LONG-TERM CURRENT USE OF INSULIN: ICD-10-CM

## 2023-03-08 DIAGNOSIS — M14.679 CHARCOT ARTHROPATHY OF MIDFOOT: Primary | ICD-10-CM

## 2023-03-08 DIAGNOSIS — I89.0 LYMPHEDEMA: ICD-10-CM

## 2023-03-08 PROCEDURE — 99214 OFFICE O/P EST MOD 30 MIN: CPT | Performed by: PODIATRIST

## 2023-03-08 PROCEDURE — 3074F SYST BP LT 130 MM HG: CPT | Performed by: PODIATRIST

## 2023-03-08 PROCEDURE — 3078F DIAST BP <80 MM HG: CPT | Performed by: PODIATRIST

## 2023-03-08 NOTE — PROGRESS NOTES
Taylor Le  1975  47 y.o. male   PCP: Rachel Sue 12/05/2022  BS: 127 per pt    03/08/2023    History of Present Illness    Taylor Le is a 47 y.o.male presents to clinic today for follow-up on his right foot. Patient has charcot of the right foot and lymphadema on both lower legs.      Past Medical History:   Diagnosis Date   • Diabetes mellitus (HCC)          Past Surgical History:   Procedure Laterality Date   • APPENDECTOMY     • EYE SURGERY           Family History   Problem Relation Age of Onset   • Heart disease Mother    • Diabetes Mother    • Diabetes Father    • Diabetes Sister    • Hypertension Sister    • Diabetes Maternal Grandmother    • Heart disease Maternal Grandfather    • Diabetes Paternal Grandmother    • Cancer Paternal Grandfather        Allergies   Allergen Reactions   • Phenobarbital Seizure       Social History     Socioeconomic History   • Marital status:    Tobacco Use   • Smoking status: Never   • Smokeless tobacco: Never   Vaping Use   • Vaping Use: Never used   Substance and Sexual Activity   • Alcohol use: Never   • Drug use: Never   • Sexual activity: Yes         Current Outpatient Medications   Medication Sig Dispense Refill   • ascorbic acid (VITAMIN C) 500 MG tablet Take 1 tablet by mouth Daily.     • aspirin 81 MG chewable tablet Chew 1 tablet Daily.     • Continuous Blood Gluc  (Dexcom G6 ) device 1 each 1 (One) Time As Needed (one device a year) for up to 1 dose. 1 each 0   • Continuous Blood Gluc  (FreeStyle Sander 14 Day [place]) device 1 Device As Needed (Check blood sugar 4 times a day and  PRN). 1 each 0   • Continuous Blood Gluc Sensor (Dexcom G6 Sensor) Every 10 (Ten) Days. 3 each 3   • Continuous Blood Gluc Sensor (FreeStyle Sander 14 Day Sensor) misc 1 each Every 14 (Fourteen) Days. 3 each 11   • Continuous Blood Gluc Transmit (Dexcom G6 Transmitter) misc 1 each Every 3 (Three) Months. 1 each 3   • furosemide (Lasix) 40 MG tablet  "Take 1 tablet by mouth Daily. 90 tablet 0   • Glucagon (Gvoke HypoPen 2-Pack) 1 MG/0.2ML solution auto-injector Inject 1 mg under the skin into the appropriate area as directed As Needed (for hypoglycemia). 0.4 mL 1   • Insulin Aspart (novoLOG) 100 UNIT/ML injection 100 units per day via insulin pump 30 mL 6   • Insulin Glargine (LANTUS SOLOSTAR) 100 UNIT/ML injection pen Inject 30 Units under the skin into the appropriate area as directed 2 (Two) Times a Day. 10 pen 11   • lisinopril-hydrochlorothiazide (PRINZIDE,ZESTORETIC) 20-25 MG per tablet Take 1 tablet by mouth Daily. 30 tablet 11   • multivitamin with minerals tablet tablet Take 1 tablet by mouth Daily.     • rosuvastatin (Crestor) 10 MG tablet Take 1 tablet by mouth Every Night. 30 tablet 11     No current facility-administered medications for this visit.       Review of Systems   Musculoskeletal:        Foot pain   All other systems reviewed and are negative.        OBJECTIVE    /79   Pulse 71   Ht 182.9 cm (72\")   Wt (!) 139 kg (306 lb)   SpO2 96%   BMI 41.50 kg/m²     Physical Exam  Vitals reviewed.   Constitutional:       General: He is not in acute distress.     Appearance: He is well-developed.   HENT:      Head: Normocephalic and atraumatic.      Nose: Nose normal.   Eyes:      Conjunctiva/sclera: Conjunctivae normal.      Pupils: Pupils are equal, round, and reactive to light.   Cardiovascular:      Pulses:           Dorsalis pedis pulses are 1+ on the right side.        Posterior tibial pulses are 1+ on the right side.   Pulmonary:      Effort: Pulmonary effort is normal. No respiratory distress.      Breath sounds: No wheezing.   Musculoskeletal:      Right lower leg: 3+ Edema present.      Right foot: Deformity and Charcot foot present.   Feet:      Right foot:      Skin integrity: Skin integrity normal.      Comments: Unstable right midfoot  Skin:     General: Skin is warm and dry.      Capillary Refill: Capillary refill takes less " than 2 seconds.   Neurological:      Mental Status: He is alert and oriented to person, place, and time.   Psychiatric:         Behavior: Behavior normal.         Thought Content: Thought content normal.                Procedures        ASSESSMENT AND PLAN    Diagnoses and all orders for this visit:    1. Charcot arthropathy of midfoot (Primary)  -     XR Foot 3+ View Right  -     Case Request; Standing  -     ceFAZolin (ANCEF) 2 g in sodium chloride 0.9 % 100 mL IVPB  -     Case Request    2. Type 2 diabetes mellitus with diabetic polyneuropathy, with long-term current use of insulin ([facility])    3. Lymphedema    Other orders  -     Follow Anesthesia Guidelines / Protocol; Future  -     Follow Anesthesia Guidelines / Protocol; Standing  -     Verify NPO Status; Standing  -     Obtain informed consent (if not collected inpatient or PAT); Standing  -     Notify Physician - Standard; Standing        -Repeat radiographs taken and reviewed  -Plan to proceed with surgical treatment  -Surgical plan is right Charcot reconstruction and all indicated procedures.  -Risks and benefits of the procedure including but not limited to postoperative infection, incisional dehiscence, numbness, swelling, residual pain and postoperative blood clot discussed in detail.  No guarantees were given or implied.  -Tentative date for the surgery April 10, 2023  -All questions were answered  -Recheck following surgery           This document has been electronically signed by Quentin Ceballos DPM on March 18, 2023 11:25 CDT     3/18/2023  11:25 CDT

## 2023-03-10 ENCOUNTER — TELEPHONE (OUTPATIENT)
Dept: PODIATRY | Facility: CLINIC | Age: 48
End: 2023-03-10
Payer: MEDICARE

## 2023-03-10 RX ORDER — BUPIVACAINE HCL/0.9 % NACL/PF 0.1 %
2 PLASTIC BAG, INJECTION (ML) EPIDURAL ONCE
OUTPATIENT
Start: 2023-04-10 | End: 2023-03-10

## 2023-03-10 NOTE — TELEPHONE ENCOUNTER
PT CALLED AND WANTED TO SEE IF HE COULD RESCHEDULE SURGERY FOR APRIL 10TH. CALL BACK [phone]

## 2023-03-13 PROBLEM — M14.679 CHARCOT ARTHROPATHY OF MIDFOOT: Status: ACTIVE | Noted: 2023-03-13

## 2023-03-23 ENCOUNTER — TELEPHONE (OUTPATIENT)
Dept: ENDOCRINOLOGY | Facility: CLINIC | Age: 48
End: 2023-03-23
Payer: MEDICARE

## 2023-03-23 DIAGNOSIS — E10.9 WELL CONTROLLED TYPE 1 DIABETES MELLITUS: Primary | ICD-10-CM

## 2023-03-23 NOTE — TELEPHONE ENCOUNTER
"Pt called stating that his insurance won't cover a generic form of insulin, and Novolog has to be sent in. \"It must say Novolog\".    Pharmacy is [facility]    Pt callback number [phone]  "

## 2023-03-27 ENCOUNTER — DOCUMENTATION (OUTPATIENT)
Dept: ENDOCRINOLOGY | Facility: CLINIC | Age: 48
End: 2023-03-27
Payer: MEDICARE

## 2023-03-27 RX ORDER — INSULIN ASPART 100 [IU]/ML
INJECTION, SOLUTION INTRAVENOUS; SUBCUTANEOUS
Qty: 5 EACH | Refills: 11 | Status: SHIPPED | OUTPATIENT
Start: 2023-03-27 | End: 2023-03-28 | Stop reason: SDUPTHER

## 2023-03-28 ENCOUNTER — TELEPHONE (OUTPATIENT)
Dept: ENDOCRINOLOGY | Facility: CLINIC | Age: 48
End: 2023-03-28
Payer: MEDICARE

## 2023-03-28 RX ORDER — INSULIN ASPART 100 [IU]/ML
INJECTION, SOLUTION INTRAVENOUS; SUBCUTANEOUS
Qty: 5 EACH | Refills: 11 | Status: SHIPPED | OUTPATIENT
Start: 2023-03-28 | End: 2023-03-29

## 2023-03-28 NOTE — TELEPHONE ENCOUNTER
Pt called stating that [facility] pharmacy is needing a DX code for his Novolog.    Please advise.    Thanks

## 2023-03-29 DIAGNOSIS — E10.9 WELL CONTROLLED TYPE 1 DIABETES MELLITUS: Primary | ICD-10-CM

## 2023-03-29 RX ORDER — INSULIN ASPART 100 [IU]/ML
INJECTION, SOLUTION INTRAVENOUS; SUBCUTANEOUS
Qty: 5 ML | Refills: 11 | Status: SHIPPED | OUTPATIENT
Start: 2023-03-29

## 2023-03-29 NOTE — TELEPHONE ENCOUNTER
[facility] PHARMACY IS REQUESTING NEW RX FOR NOVOLOG BRAND TO BE SENT AS PATIENT'S INSURANCE PREFERS BRAND.

## 2023-03-31 ENCOUNTER — PRE-ADMISSION TESTING (OUTPATIENT)
Dept: PREADMISSION TESTING | Facility: HOSPITAL | Age: 48
End: 2023-03-31
Payer: MEDICARE

## 2023-03-31 VITALS
SYSTOLIC BLOOD PRESSURE: 160 MMHG | HEART RATE: 67 BPM | OXYGEN SATURATION: 98 % | WEIGHT: 306 LBS | DIASTOLIC BLOOD PRESSURE: 80 MMHG | RESPIRATION RATE: 14 BRPM | HEIGHT: 72 IN | BODY MASS INDEX: 41.45 KG/M2

## 2023-03-31 LAB
ANION GAP SERPL CALCULATED.3IONS-SCNC: 11 MMOL/L (ref 5–15)
BUN SERPL-MCNC: 22 MG/DL (ref 6–20)
BUN/CREAT SERPL: 23.2 (ref 7–25)
CALCIUM SPEC-SCNC: 9.3 MG/DL (ref 8.6–10.5)
CHLORIDE SERPL-SCNC: 95 MMOL/L (ref 98–107)
CO2 SERPL-SCNC: 26 MMOL/L (ref 22–29)
CREAT SERPL-MCNC: 0.95 MG/DL (ref 0.76–1.27)
EGFRCR SERPLBLD CKD-EPI 2021: 100.6 ML/MIN/1.73
GLUCOSE SERPL-MCNC: 118 MG/DL (ref 65–99)
POTASSIUM SERPL-SCNC: 4.3 MMOL/L (ref 3.5–5.2)
SODIUM SERPL-SCNC: 132 MMOL/L (ref 136–145)

## 2023-03-31 PROCEDURE — 36415 COLL VENOUS BLD VENIPUNCTURE: CPT

## 2023-03-31 PROCEDURE — 80048 BASIC METABOLIC PNL TOTAL CA: CPT

## 2023-03-31 RX ORDER — SODIUM CHLORIDE 9 MG/ML
1000 INJECTION, SOLUTION INTRAVENOUS CONTINUOUS
Status: CANCELLED | OUTPATIENT
Start: 2023-04-10

## 2023-04-10 ENCOUNTER — ANESTHESIA EVENT (OUTPATIENT)
Dept: PERIOP | Facility: HOSPITAL | Age: 48
End: 2023-04-10
Payer: MEDICARE

## 2023-04-10 ENCOUNTER — APPOINTMENT (OUTPATIENT)
Dept: GENERAL RADIOLOGY | Facility: HOSPITAL | Age: 48
End: 2023-04-10
Payer: MEDICARE

## 2023-04-10 ENCOUNTER — HOSPITAL ENCOUNTER (OUTPATIENT)
Facility: HOSPITAL | Age: 48
Discharge: HOME | End: 2023-04-13
Attending: PODIATRIST | Admitting: PODIATRIST
Payer: MEDICARE

## 2023-04-10 ENCOUNTER — ANESTHESIA (OUTPATIENT)
Dept: PERIOP | Facility: HOSPITAL | Age: 48
End: 2023-04-10
Payer: MEDICARE

## 2023-04-10 DIAGNOSIS — M14.679 CHARCOT ARTHROPATHY OF MIDFOOT: ICD-10-CM

## 2023-04-10 DIAGNOSIS — Z74.09 IMPAIRED MOBILITY AND ADLS: ICD-10-CM

## 2023-04-10 DIAGNOSIS — Z74.09 IMPAIRED FUNCTIONAL MOBILITY, BALANCE, GAIT, AND ENDURANCE: ICD-10-CM

## 2023-04-10 DIAGNOSIS — Z78.9 IMPAIRED MOBILITY AND ADLS: ICD-10-CM

## 2023-04-10 LAB
ATMOSPHERIC PRESS: 754 MMHG
BASE EXCESS BLDV CALC-SCNC: -2.7 MMOL/L (ref 0–2)
BDY SITE: ABNORMAL
COHGB MFR BLD: 1.2 % (ref 0–5)
GLUCOSE BLDC GLUCOMTR-MCNC: 128 MG/DL (ref 70–130)
GLUCOSE BLDC GLUCOMTR-MCNC: 186 MG/DL (ref 70–130)
GLUCOSE BLDC GLUCOMTR-MCNC: 248 MG/DL (ref 70–130)
HCO3 BLDV-SCNC: 22.9 MMOL/L (ref 18–23)
HGB BLDA-MCNC: 12.5 G/DL (ref 14–18)
METHGB BLD QL: 0.8 % (ref 0–3)
MODALITY: ABNORMAL
NOTE: ABNORMAL
OXYHGB MFR BLDV: 84.6 % (ref 45–75)
PCO2 BLDV: 41.8 MM HG (ref 41–51)
PH BLDV: 7.35 PH UNITS (ref 7.29–7.37)
PO2 BLDV: 54.4 MM HG (ref 27–53)
POTASSIUM BLDV-SCNC: 4.3 MMOL/L (ref 3.5–5)
SAO2 % BLDCOA: 84.6 % (ref 94–99)
SODIUM BLDV-SCNC: 138 MMOL/L (ref 136–146)

## 2023-04-10 PROCEDURE — 82962 GLUCOSE BLOOD TEST: CPT

## 2023-04-10 PROCEDURE — 25010000002 PROPOFOL 200 MG/20ML EMULSION: Performed by: ANESTHESIOLOGY

## 2023-04-10 PROCEDURE — 25010000002 SUCCINYLCHOLINE PER 20 MG: Performed by: ANESTHESIOLOGY

## 2023-04-10 PROCEDURE — 76000 FLUOROSCOPY <1 HR PHYS/QHP: CPT

## 2023-04-10 PROCEDURE — 25010000002 FENTANYL CITRATE (PF) 100 MCG/2ML SOLUTION: Performed by: ANESTHESIOLOGY

## 2023-04-10 PROCEDURE — 25010000002 KETOROLAC TROMETHAMINE PER 15 MG: Performed by: NURSE ANESTHETIST, CERTIFIED REGISTERED

## 2023-04-10 PROCEDURE — 28715 ARTHRODESIS TRIPLE: CPT | Performed by: PODIATRIST

## 2023-04-10 PROCEDURE — 63710000001 SENNOSIDES-DOCUSATE 8.6-50 MG TABLET: Performed by: PODIATRIST

## 2023-04-10 PROCEDURE — 25010000002 CEFAZOLIN PER 500 MG: Performed by: PODIATRIST

## 2023-04-10 PROCEDURE — 27685 REVISION OF LOWER LEG TENDON: CPT | Performed by: PODIATRIST

## 2023-04-10 PROCEDURE — 25010000002 MIDAZOLAM PER 1 MG: Performed by: ANESTHESIOLOGY

## 2023-04-10 PROCEDURE — 25010000002 HYDROMORPHONE 1 MG/ML SOLUTION: Performed by: NURSE ANESTHETIST, CERTIFIED REGISTERED

## 2023-04-10 PROCEDURE — A9270 NON-COVERED ITEM OR SERVICE: HCPCS | Performed by: PODIATRIST

## 2023-04-10 PROCEDURE — 25010000002 ONDANSETRON PER 1 MG: Performed by: NURSE ANESTHETIST, CERTIFIED REGISTERED

## 2023-04-10 PROCEDURE — 28730 FUSION OF FOOT BONES: CPT | Performed by: PODIATRIST

## 2023-04-10 PROCEDURE — 82820 HEMOGLOBIN-OXYGEN AFFINITY: CPT | Performed by: PODIATRIST

## 2023-04-10 PROCEDURE — 25010000002 PHENYLEPHRINE 10 MG/ML SOLUTION: Performed by: ANESTHESIOLOGY

## 2023-04-10 PROCEDURE — 25010000002 CEFAZOLIN PER 500 MG: Performed by: NURSE ANESTHETIST, CERTIFIED REGISTERED

## 2023-04-10 PROCEDURE — 82805 BLOOD GASES W/O2 SATURATION: CPT | Performed by: PODIATRIST

## 2023-04-10 RX ORDER — LIDOCAINE HYDROCHLORIDE 20 MG/ML
INJECTION, SOLUTION EPIDURAL; INFILTRATION; INTRACAUDAL; PERINEURAL AS NEEDED
Status: DISCONTINUED | OUTPATIENT
Start: 2023-04-10 | End: 2023-04-10 | Stop reason: SURG

## 2023-04-10 RX ORDER — ONDANSETRON 2 MG/ML
INJECTION INTRAMUSCULAR; INTRAVENOUS AS NEEDED
Status: DISCONTINUED | OUTPATIENT
Start: 2023-04-10 | End: 2023-04-10 | Stop reason: SURG

## 2023-04-10 RX ORDER — ONDANSETRON 2 MG/ML
4 INJECTION INTRAMUSCULAR; INTRAVENOUS EVERY 6 HOURS PRN
Status: DISCONTINUED | OUTPATIENT
Start: 2023-04-10 | End: 2023-04-13 | Stop reason: HOSPADM

## 2023-04-10 RX ORDER — SODIUM CHLORIDE 0.9 % (FLUSH) 0.9 %
10 SYRINGE (ML) INJECTION EVERY 12 HOURS SCHEDULED
Status: DISCONTINUED | OUTPATIENT
Start: 2023-04-10 | End: 2023-04-13 | Stop reason: HOSPADM

## 2023-04-10 RX ORDER — SODIUM CHLORIDE 9 MG/ML
INJECTION, SOLUTION INTRAVENOUS CONTINUOUS PRN
Status: DISCONTINUED | OUTPATIENT
Start: 2023-04-10 | End: 2023-04-10 | Stop reason: SURG

## 2023-04-10 RX ORDER — ACETAMINOPHEN 650 MG/1
650 SUPPOSITORY RECTAL ONCE AS NEEDED
Status: COMPLETED | OUTPATIENT
Start: 2023-04-10 | End: 2023-04-10

## 2023-04-10 RX ORDER — EPHEDRINE SULFATE 50 MG/ML
INJECTION INTRAVENOUS AS NEEDED
Status: DISCONTINUED | OUTPATIENT
Start: 2023-04-10 | End: 2023-04-10 | Stop reason: SURG

## 2023-04-10 RX ORDER — FLUMAZENIL 0.1 MG/ML
0.2 INJECTION INTRAVENOUS AS NEEDED
Status: DISCONTINUED | OUTPATIENT
Start: 2023-04-10 | End: 2023-04-10 | Stop reason: HOSPADM

## 2023-04-10 RX ORDER — SODIUM CHLORIDE, SODIUM GLUCONATE, SODIUM ACETATE, POTASSIUM CHLORIDE AND MAGNESIUM CHLORIDE 526; 502; 368; 37; 30 MG/100ML; MG/100ML; MG/100ML; MG/100ML; MG/100ML
INJECTION, SOLUTION INTRAVENOUS CONTINUOUS PRN
Status: DISCONTINUED | OUTPATIENT
Start: 2023-04-10 | End: 2023-04-10 | Stop reason: SURG

## 2023-04-10 RX ORDER — PHENYLEPHRINE HCL IN 0.9% NACL 0.5 MG/5ML
SYRINGE (ML) INTRAVENOUS AS NEEDED
Status: DISCONTINUED | OUTPATIENT
Start: 2023-04-10 | End: 2023-04-10 | Stop reason: SURG

## 2023-04-10 RX ORDER — PROMETHAZINE HYDROCHLORIDE 25 MG/1
25 SUPPOSITORY RECTAL ONCE AS NEEDED
Status: DISCONTINUED | OUTPATIENT
Start: 2023-04-10 | End: 2023-04-10 | Stop reason: HOSPADM

## 2023-04-10 RX ORDER — SUCCINYLCHOLINE CHLORIDE 20 MG/ML
INJECTION INTRAMUSCULAR; INTRAVENOUS AS NEEDED
Status: DISCONTINUED | OUTPATIENT
Start: 2023-04-10 | End: 2023-04-10 | Stop reason: SURG

## 2023-04-10 RX ORDER — NALOXONE HCL 0.4 MG/ML
0.4 VIAL (ML) INJECTION
Status: DISCONTINUED | OUTPATIENT
Start: 2023-04-10 | End: 2023-04-13 | Stop reason: HOSPADM

## 2023-04-10 RX ORDER — AMOXICILLIN 250 MG
2 CAPSULE ORAL 2 TIMES DAILY
Status: DISCONTINUED | OUTPATIENT
Start: 2023-04-10 | End: 2023-04-13 | Stop reason: HOSPADM

## 2023-04-10 RX ORDER — OXYCODONE AND ACETAMINOPHEN 10; 325 MG/1; MG/1
1 TABLET ORAL EVERY 4 HOURS PRN
Qty: 30 TABLET | Refills: 0 | Status: SHIPPED | OUTPATIENT
Start: 2023-04-10

## 2023-04-10 RX ORDER — CEFAZOLIN SODIUM 1 G/3ML
INJECTION, POWDER, FOR SOLUTION INTRAMUSCULAR; INTRAVENOUS AS NEEDED
Status: DISCONTINUED | OUTPATIENT
Start: 2023-04-10 | End: 2023-04-10 | Stop reason: SURG

## 2023-04-10 RX ORDER — ACETAMINOPHEN 325 MG/1
650 TABLET ORAL ONCE AS NEEDED
Status: COMPLETED | OUTPATIENT
Start: 2023-04-10 | End: 2023-04-10

## 2023-04-10 RX ORDER — ROCURONIUM BROMIDE 10 MG/ML
INJECTION, SOLUTION INTRAVENOUS AS NEEDED
Status: DISCONTINUED | OUTPATIENT
Start: 2023-04-10 | End: 2023-04-10 | Stop reason: SURG

## 2023-04-10 RX ORDER — OXYCODONE AND ACETAMINOPHEN 7.5; 325 MG/1; MG/1
1 TABLET ORAL EVERY 4 HOURS PRN
Status: DISCONTINUED | OUTPATIENT
Start: 2023-04-10 | End: 2023-04-13 | Stop reason: HOSPADM

## 2023-04-10 RX ORDER — PROPOFOL 10 MG/ML
INJECTION, EMULSION INTRAVENOUS AS NEEDED
Status: DISCONTINUED | OUTPATIENT
Start: 2023-04-10 | End: 2023-04-10 | Stop reason: SURG

## 2023-04-10 RX ORDER — SODIUM CHLORIDE 0.9 % (FLUSH) 0.9 %
10 SYRINGE (ML) INJECTION AS NEEDED
Status: DISCONTINUED | OUTPATIENT
Start: 2023-04-10 | End: 2023-04-13 | Stop reason: HOSPADM

## 2023-04-10 RX ORDER — DIPHENHYDRAMINE HYDROCHLORIDE 50 MG/ML
12.5 INJECTION INTRAMUSCULAR; INTRAVENOUS
Status: DISCONTINUED | OUTPATIENT
Start: 2023-04-10 | End: 2023-04-10 | Stop reason: HOSPADM

## 2023-04-10 RX ORDER — NALOXONE HCL 0.4 MG/ML
0.4 VIAL (ML) INJECTION AS NEEDED
Status: DISCONTINUED | OUTPATIENT
Start: 2023-04-10 | End: 2023-04-10 | Stop reason: HOSPADM

## 2023-04-10 RX ORDER — KETOROLAC TROMETHAMINE 30 MG/ML
INJECTION, SOLUTION INTRAMUSCULAR; INTRAVENOUS AS NEEDED
Status: DISCONTINUED | OUTPATIENT
Start: 2023-04-10 | End: 2023-04-10 | Stop reason: SURG

## 2023-04-10 RX ORDER — HEPARIN SODIUM 5000 [USP'U]/ML
5000 INJECTION, SOLUTION INTRAVENOUS; SUBCUTANEOUS EVERY 12 HOURS SCHEDULED
Status: DISCONTINUED | OUTPATIENT
Start: 2023-04-11 | End: 2023-04-13 | Stop reason: HOSPADM

## 2023-04-10 RX ORDER — ASPIRIN 325 MG
325 TABLET ORAL EVERY 12 HOURS
Qty: 30 TABLET | Refills: 1 | Status: SHIPPED | OUTPATIENT
Start: 2023-04-10

## 2023-04-10 RX ORDER — MEPERIDINE HYDROCHLORIDE 50 MG/ML
12.5 INJECTION INTRAMUSCULAR; INTRAVENOUS; SUBCUTANEOUS
Status: DISCONTINUED | OUTPATIENT
Start: 2023-04-10 | End: 2023-04-10 | Stop reason: HOSPADM

## 2023-04-10 RX ORDER — BACITRACIN ZINC 500 [USP'U]/G
OINTMENT TOPICAL AS NEEDED
Status: DISCONTINUED | OUTPATIENT
Start: 2023-04-10 | End: 2023-04-10 | Stop reason: HOSPADM

## 2023-04-10 RX ORDER — MIDAZOLAM HYDROCHLORIDE 1 MG/ML
INJECTION INTRAMUSCULAR; INTRAVENOUS AS NEEDED
Status: DISCONTINUED | OUTPATIENT
Start: 2023-04-10 | End: 2023-04-10 | Stop reason: SURG

## 2023-04-10 RX ORDER — ONDANSETRON 2 MG/ML
4 INJECTION INTRAMUSCULAR; INTRAVENOUS ONCE AS NEEDED
Status: DISCONTINUED | OUTPATIENT
Start: 2023-04-10 | End: 2023-04-10 | Stop reason: HOSPADM

## 2023-04-10 RX ORDER — FENTANYL CITRATE 50 UG/ML
INJECTION, SOLUTION INTRAMUSCULAR; INTRAVENOUS AS NEEDED
Status: DISCONTINUED | OUTPATIENT
Start: 2023-04-10 | End: 2023-04-10 | Stop reason: SURG

## 2023-04-10 RX ORDER — BUPIVACAINE HCL/0.9 % NACL/PF 0.1 %
2 PLASTIC BAG, INJECTION (ML) EPIDURAL ONCE
Status: COMPLETED | OUTPATIENT
Start: 2023-04-10 | End: 2023-04-10

## 2023-04-10 RX ORDER — BISACODYL 10 MG
10 SUPPOSITORY, RECTAL RECTAL DAILY PRN
Status: DISCONTINUED | OUTPATIENT
Start: 2023-04-10 | End: 2023-04-13 | Stop reason: HOSPADM

## 2023-04-10 RX ORDER — PROMETHAZINE HYDROCHLORIDE 25 MG/1
25 TABLET ORAL ONCE AS NEEDED
Status: DISCONTINUED | OUTPATIENT
Start: 2023-04-10 | End: 2023-04-10 | Stop reason: HOSPADM

## 2023-04-10 RX ORDER — SODIUM CHLORIDE 9 MG/ML
1000 INJECTION, SOLUTION INTRAVENOUS CONTINUOUS
Status: DISPENSED | OUTPATIENT
Start: 2023-04-10 | End: 2023-04-12

## 2023-04-10 RX ORDER — POLYETHYLENE GLYCOL 3350 17 G/17G
17 POWDER, FOR SOLUTION ORAL DAILY PRN
Status: DISCONTINUED | OUTPATIENT
Start: 2023-04-10 | End: 2023-04-13 | Stop reason: HOSPADM

## 2023-04-10 RX ORDER — SODIUM CHLORIDE 9 MG/ML
40 INJECTION, SOLUTION INTRAVENOUS AS NEEDED
Status: DISCONTINUED | OUTPATIENT
Start: 2023-04-10 | End: 2023-04-13 | Stop reason: HOSPADM

## 2023-04-10 RX ORDER — GABAPENTIN 300 MG/1
300 CAPSULE ORAL 3 TIMES DAILY
Qty: 30 CAPSULE | Refills: 0 | Status: SHIPPED | OUTPATIENT
Start: 2023-04-10

## 2023-04-10 RX ORDER — BISACODYL 5 MG/1
5 TABLET, DELAYED RELEASE ORAL DAILY PRN
Status: DISCONTINUED | OUTPATIENT
Start: 2023-04-10 | End: 2023-04-13 | Stop reason: HOSPADM

## 2023-04-10 RX ADMIN — EPHEDRINE SULFATE 5 MG: 50 INJECTION INTRAVENOUS at 12:06

## 2023-04-10 RX ADMIN — SODIUM CHLORIDE: 9 INJECTION, SOLUTION INTRAVENOUS at 12:16

## 2023-04-10 RX ADMIN — EPHEDRINE SULFATE 5 MG: 50 INJECTION INTRAVENOUS at 11:50

## 2023-04-10 RX ADMIN — ONDANSETRON 4 MG: 2 INJECTION INTRAMUSCULAR; INTRAVENOUS at 15:25

## 2023-04-10 RX ADMIN — PHENYLEPHRINE HYDROCHLORIDE 100 MCG: 10 INJECTION, SOLUTION INTRAVENOUS at 11:30

## 2023-04-10 RX ADMIN — ACETAMINOPHEN 650 MG: 325 TABLET ORAL at 18:19

## 2023-04-10 RX ADMIN — EPHEDRINE SULFATE 5 MG: 50 INJECTION INTRAVENOUS at 13:15

## 2023-04-10 RX ADMIN — FENTANYL CITRATE 25 MCG: 50 INJECTION, SOLUTION INTRAMUSCULAR; INTRAVENOUS at 12:33

## 2023-04-10 RX ADMIN — KETOROLAC TROMETHAMINE 30 MG: 30 INJECTION, SOLUTION INTRAMUSCULAR; INTRAVENOUS at 15:25

## 2023-04-10 RX ADMIN — EPHEDRINE SULFATE 5 MG: 50 INJECTION INTRAVENOUS at 11:12

## 2023-04-10 RX ADMIN — MIDAZOLAM HYDROCHLORIDE 2 MG: 1 INJECTION, SOLUTION INTRAMUSCULAR; INTRAVENOUS at 10:43

## 2023-04-10 RX ADMIN — FENTANYL CITRATE 25 MCG: 50 INJECTION, SOLUTION INTRAMUSCULAR; INTRAVENOUS at 14:07

## 2023-04-10 RX ADMIN — SODIUM CHLORIDE, SODIUM GLUCONATE, SODIUM ACETATE, POTASSIUM CHLORIDE AND MAGNESIUM CHLORIDE: 526; 502; 368; 37; 30 INJECTION, SOLUTION INTRAVENOUS at 14:38

## 2023-04-10 RX ADMIN — EPHEDRINE SULFATE 10 MG: 50 INJECTION INTRAVENOUS at 11:52

## 2023-04-10 RX ADMIN — EPHEDRINE SULFATE 5 MG: 50 INJECTION INTRAVENOUS at 12:38

## 2023-04-10 RX ADMIN — EPHEDRINE SULFATE 5 MG: 50 INJECTION INTRAVENOUS at 11:01

## 2023-04-10 RX ADMIN — HYDROMORPHONE HYDROCHLORIDE 0.25 MG: 1 INJECTION, SOLUTION INTRAMUSCULAR; INTRAVENOUS; SUBCUTANEOUS at 14:48

## 2023-04-10 RX ADMIN — FENTANYL CITRATE 50 MCG: 50 INJECTION, SOLUTION INTRAMUSCULAR; INTRAVENOUS at 11:17

## 2023-04-10 RX ADMIN — PROPOFOL 50 MG: 10 INJECTION, EMULSION INTRAVENOUS at 10:45

## 2023-04-10 RX ADMIN — SUCCINYLCHOLINE CHLORIDE 200 MG: 20 INJECTION, SOLUTION INTRAMUSCULAR; INTRAVENOUS at 10:44

## 2023-04-10 RX ADMIN — EPHEDRINE SULFATE 5 MG: 50 INJECTION INTRAVENOUS at 11:48

## 2023-04-10 RX ADMIN — HYDROMORPHONE HYDROCHLORIDE 0.25 MG: 1 INJECTION, SOLUTION INTRAMUSCULAR; INTRAVENOUS; SUBCUTANEOUS at 14:59

## 2023-04-10 RX ADMIN — FENTANYL CITRATE 25 MCG: 50 INJECTION, SOLUTION INTRAMUSCULAR; INTRAVENOUS at 13:08

## 2023-04-10 RX ADMIN — PHENYLEPHRINE HYDROCHLORIDE 100 MCG: 10 INJECTION, SOLUTION INTRAVENOUS at 11:13

## 2023-04-10 RX ADMIN — PHENYLEPHRINE HYDROCHLORIDE 100 MCG: 10 INJECTION, SOLUTION INTRAVENOUS at 15:54

## 2023-04-10 RX ADMIN — LIDOCAINE HYDROCHLORIDE 100 MG: 20 INJECTION, SOLUTION EPIDURAL; INFILTRATION; INTRACAUDAL at 10:44

## 2023-04-10 RX ADMIN — Medication 2 G: at 10:54

## 2023-04-10 RX ADMIN — SODIUM CHLORIDE 1000 ML: 9 INJECTION, SOLUTION INTRAVENOUS at 09:28

## 2023-04-10 RX ADMIN — ROCURONIUM BROMIDE 5 MG: 50 INJECTION INTRAVENOUS at 10:44

## 2023-04-10 RX ADMIN — FENTANYL CITRATE 50 MCG: 50 INJECTION, SOLUTION INTRAMUSCULAR; INTRAVENOUS at 10:44

## 2023-04-10 RX ADMIN — DOCUSATE SODIUM 50 MG AND SENNOSIDES 8.6 MG 2 TABLET: 8.6; 5 TABLET, FILM COATED ORAL at 21:28

## 2023-04-10 RX ADMIN — PROPOFOL 150 MG: 10 INJECTION, EMULSION INTRAVENOUS at 10:44

## 2023-04-10 RX ADMIN — CEFAZOLIN SODIUM 2 G: 1 INJECTION, POWDER, FOR SOLUTION INTRAMUSCULAR; INTRAVENOUS at 14:53

## 2023-04-10 RX ADMIN — PHENYLEPHRINE HYDROCHLORIDE 100 MCG: 10 INJECTION, SOLUTION INTRAVENOUS at 11:00

## 2023-04-10 RX ADMIN — PHENYLEPHRINE HYDROCHLORIDE 100 MCG: 10 INJECTION, SOLUTION INTRAVENOUS at 11:40

## 2023-04-10 RX ADMIN — FENTANYL CITRATE 25 MCG: 50 INJECTION, SOLUTION INTRAMUSCULAR; INTRAVENOUS at 14:17

## 2023-04-10 NOTE — H&P
[facility]   PREOPERATIVE HISTORY AND PHYSICAL    Patient Name:Taylor Le  : 1975  MRN: [id number]  Primary Care Physician: Rachel Sue APRN  Date of admission: 4/10/2023    Subjective   Subjective     Chief Complaint: preoperative evaluation    History of Present Illness  Taylor Le is a 47 y.o. male who presents for preoperative evaluation. He is scheduled for CHARCOT FOOT RECONSTRUCTION AND ALL OTHER INDICATED PROCEDURES (Right)    Review of Systems   Cardiovascular: Positive for leg swelling.   All other systems reviewed and are negative.       Personal History     Past Medical History:   Diagnosis Date   • Diabetes mellitus    • Hyperlipidemia    • Hypertension        Past Surgical History:   Procedure Laterality Date   • APPENDECTOMY     • CATARACT EXTRACTION Bilateral    • RETINAL DETACHMENT REPAIR Right 2010   • RETINAL DETACHMENT SURGERY Left 2009       Family History: His family history includes Cancer in his paternal grandfather; Diabetes in his father, maternal grandmother, mother, paternal grandmother, and sister; Heart disease in his maternal grandfather and mother; Hypertension in his sister.     Social History: He  reports that he has never smoked. He has never used smokeless tobacco. He reports that he does not drink alcohol and does not use drugs.    Home Medications:  Dexcom G6 , Dexcom G6 Sensor, Dexcom G6 Transmitter, FreeStyle Sander 14 Day [place], FreeStyle Sander 14 Day Sensor, Glucagon, Insulin Aspart, Insulin Glargine, ascorbic acid, aspirin, furosemide, lisinopril-hydrochlorothiazide, multivitamin with minerals, and rosuvastatin    Allergies:  He is allergic to phenobarbital.    Objective    Objective     Vitals:    Temp:  [97.2 °F (36.2 °C)] 97.2 °F (36.2 °C)  Heart Rate:  [78] 78  Resp:  [18] 18  BP: (119)/(55) 119/55    Physical Exam  Vitals reviewed.   Constitutional:       General: He is not in acute distress.     Appearance: He is well-developed.   HENT:       Head: Normocephalic and atraumatic.      Nose: Nose normal.   Eyes:      Conjunctiva/sclera: Conjunctivae normal.      Pupils: Pupils are equal, round, and reactive to light.   Cardiovascular:      Pulses:           Dorsalis pedis pulses are 2+ on the right side.        Posterior tibial pulses are 2+ on the right side.   Pulmonary:      Effort: Pulmonary effort is normal. No respiratory distress.      Breath sounds: No wheezing.   Musculoskeletal:      Right foot: Decreased range of motion. Deformity present.   Feet:      Right foot:      Skin integrity: Skin integrity normal.   Skin:     General: Skin is warm and dry.      Capillary Refill: Capillary refill takes less than 2 seconds.   Neurological:      Mental Status: He is alert and oriented to person, place, and time.   Psychiatric:         Behavior: Behavior normal.         Thought Content: Thought content normal.         Assessment & Plan   Assessment / Plan     Brief Patient Summary:  Taylor Le is a 47 y.o. male who presents for preoperative evaluation.    Pre-Op Diagnosis Codes:     * Charcot arthropathy of midfoot [M14.679]    Active Hospital Problems:  Active Hospital Problems    Diagnosis    • **Charcot arthropathy of midfoot      Plan:   Procedure(s):  CHARCOT FOOT RECONSTRUCTION AND ALL OTHER INDICATED PROCEDURES    The risks, benefits, and alternatives of the procedure including but not limited to infection, wound healing complications and blood clots  and risks of the anesthesia were discussed in detail with the patient and questions were answered. No guarantees were made or implied. Informed consent was obtained.    Quentin Ceballos DPM

## 2023-04-10 NOTE — ANESTHESIA PROCEDURE NOTES
Airway  Urgency: elective    Date/Time: 4/10/2023 10:49 AM  End Time:4/10/2023 10:50 AM  Airway not difficult    General Information and Staff    Patient location during procedure: OR  CRNA/CAA: Jeet Griffin CRNA  SRNA: Moshe Vicente SRNA  Indications and Patient Condition  Indications for airway management: airway protection    Preoxygenated: yes  MILS not maintained throughout  Mask difficulty assessment: 1 - vent by mask    Final Airway Details  Final airway type: endotracheal airway      Successful airway: ETT  Cuffed: yes   Successful intubation technique: video laryngoscopy  Facilitating devices/methods: intubating stylet  Endotracheal tube insertion site: oral  Blade: Mondragon  Blade size: 3  ETT size (mm): 7.5  Cormack-Lehane Classification: grade IIa - partial view of glottis  Placement verified by: chest auscultation and capnometry   Cuff volume (mL): 8  Measured from: lips  ETT/EBT  to lips (cm): 22  Number of attempts at approach: 2  Assessment: lips, teeth, and gum same as pre-op and atraumatic intubation

## 2023-04-10 NOTE — BRIEF OP NOTE
CHARCOT FOOT RECONSTRUCTION  Progress Note    Taylor SOARES Rey  4/10/2023    Pre-op Diagnosis:   Charcot arthropathy of midfoot [M14.679]       Post-Op Diagnosis Codes:     * Charcot arthropathy of midfoot [M14.679]    Procedure/CPT® Codes:        Procedure(s):  CHARCOT FOOT RECONSTRUCTION         Surgeon(s):  Quentin Ceballos DPM    Anesthesia: General    Staff:   Circulator: Lou Pino RN; Rajat Riggins RN; Magaly Mcfadden RN  Scrub Person: Sofya [place]; Bharati Moreno; Vicky Stoll  Assistant: Rachel Zelaya MA  Assistant: Rachel Zelaya MA      Estimated Blood Loss: 500 mL    Urine Voided: 1300 mL    Specimens:                None          Drains: * No LDAs found *    Findings: consistent with pre-op dx        Complications: none    Assistant: Rachel Zelaya MA  was responsible for performing the following activities: Retraction, Suction, Irrigation and Placing Dressing and their skilled assistance was necessary for the success of this case.    Quentin Ceballos DPM     Date: 4/10/2023  Time: 16:26 CDT

## 2023-04-10 NOTE — ANESTHESIA POSTPROCEDURE EVALUATION
Patient: Taylor Le    Procedure Summary     Date: 04/10/23 Room / Location: [facility] OR  /  [facility] OR    Anesthesia Start: 1037 Anesthesia Stop: 1622    Procedure: CHARCOT FOOT RECONSTRUCTION AND ALL OTHER INDICATED PROCEDURES (Right: Foot) Diagnosis:       Charcot arthropathy of midfoot      (Charcot arthropathy of midfoot [M14.679])    Surgeons: Quentin Ceballos DPM Provider: Teodora Sung CRNA    Anesthesia Type: general ASA Status: 3          Anesthesia Type: general    Vitals  Vitals Value Taken Time   /58 04/10/23 1618   Temp 97.8 °F (36.6 °C) 04/10/23 1618   Pulse 90 04/10/23 1618   Resp     SpO2 94 % 04/10/23 1618           Post Anesthesia Care and Evaluation    Patient location during evaluation: PACU  Patient participation: complete - patient participated  Level of consciousness: awake and alert  Pain score: 0  Pain management: adequate    Airway patency: patent  Anesthetic complications: No anesthetic complications    Cardiovascular status: acceptable  Respiratory status: acceptable, spontaneous ventilation and room air  Hydration status: acceptable    Comments: HR 90  SpO2 93%  RR 16  /58  T. 97.8

## 2023-04-10 NOTE — ANESTHESIA PREPROCEDURE EVALUATION
Anesthesia Evaluation     Patient summary reviewed and Nursing notes reviewed   no history of anesthetic complications:  NPO Solid Status: > 8 hours  NPO Liquid Status: > 2 hours           Airway   Mallampati: II  TM distance: >3 FB  Neck ROM: full  possible difficult intubation  Comment:  Date: 06/19/12; Time: 1913; Airway Device: ETT- Cuffed; Size: 8; Difficulty: Atraumatic; Removal Date: 06/19/12; Removal Time: 1958   Dental    (+) poor dentation    Pulmonary - negative pulmonary ROS    breath sounds clear to auscultation  (-) asthma, shortness of breath, not a smoker  Cardiovascular     ECG reviewed  Rhythm: regular  Rate: normal    (+) hypertension, valvular problems/murmurs murmur and TI, murmur (Grade II/VI systolic), hyperlipidemia,   (-) past MI, angina, CHRISTIANSON, cardiac stents    ROS comment: Normal sinus rhythm  Rightward axis  Borderline ECG  No previous ECGs available     Referred By: NEVA           Confirmed By: STEPHEN CORTEZ MD•  Left ventricular ejection fraction appears to be 61 - 65%.  •  The right ventricular cavity is borderline dilated.  •  Estimated right ventricular systolic pressure from tricuspid regurgitation is normal (<35 mmHg).  •  No significant valve abnormalities      Neuro/Psych- negative ROS  GI/Hepatic/Renal/Endo    (+) morbid obesity,  diabetes mellitus (Insulin pump) type 2 using insulin,     Musculoskeletal     Abdominal   (+) obese,    Substance History - negative use     OB/GYN negative ob/gyn ROS         Other   arthritis,          Phys Exam Other: Full trimmed beard.                Anesthesia Plan    ASA 3     general     intravenous induction     Anesthetic plan, risks, benefits, and alternatives have been provided, discussed and informed consent has been obtained with: patient.  Pre-procedure education provided  Plan discussed with CRNA.        CODE STATUS:

## 2023-04-10 NOTE — DISCHARGE INSTRUCTIONS
Discharge home  Keep dressing and splint clean,dry, and intact  Nonweight bearing to  Right lower extremity  Ice and elevate on blue foam elevator  Percocet 10mg   Gabapentin 300mg  Aspirin 325mg  Follow up in 1 week          This document has been electronically signed by Quentin Ceballos DPM on April 10, 2023 16:30 CDT         What to expect after a Nerve Block    Nerve blocks administered to block pain affect many types of nerves, including those nerves that control movement, pain, and normal sensation. Following a nerve block, you may notice some bruising at the site where the block was given. You may experience sensations such as: numbness of the affected area or limb, tingling, heaviness (that is the limb feels heavy to you), weakness or inability to move the affected arm or leg, or a feeling as if your arm or leg has “fallen asleep.”     A nerve block can last from 2 to 36 hours depending on the medications used.  Usually the weakness wears off first followed by the tingling and heaviness. As the block wears off, you may begin to notice pain; however, this sequence of events may occur in any order. Typically, you will be able to move your limb before you will feel it. Once a nerve block begins to wear off, the effects are usually completely gone within 60 minutes.  If you experience continued side effects that you believe are block related for longer than 48 hours, please call your healthcare provider. Please see block-specific instructions below.    Instructions for any Block involving the leg/foot:   If you have had a leg /foot block, you should not bear weight on the affected leg until the block has worn off. After the block has worn off, weight bearing should be as directed by your surgeon. You may be sent home with crutches. You are at high risk for falling because of the anesthetic effects on your leg. Please use caution when standing or trying to move or walk. Have someone assist you until your leg and  foot function have returned to normal.     Protection of a “blocked” limb  After a nerve block, you cannot feel pain, pressure, or extremes of temperature in the affected limb. And because of this, your blocked limb is at more risk for injury. For example, it is possible to burn your limb on an extremely hot surface without feeling it.     When resting, it is important to reposition your limb periodically to avoid prolonged pressure on it. This may require the use of pillows and padding.    While sleeping, you should avoid rolling onto the affected limb or putting too much pressure on it.     If you have a cast or tight dressing, check the color of your fingers or toes of the affected limb. Call your surgeon if they look discolored (that is, dusky, dark colored).    Use caution in cold weather. Cover your limb appropriately to protect it from the cold.    Pain Management:  Your surgeon will give you a prescription for pain medication. Begin taking this before the nerve block wears off. Bear in mind that sometimes the block can wear off in the middle of the night.

## 2023-04-11 LAB
ALBUMIN SERPL-MCNC: 3.2 G/DL (ref 3.5–5.2)
ALBUMIN/GLOB SERPL: 1.2 G/DL
ALP SERPL-CCNC: 61 U/L (ref 39–117)
ALT SERPL W P-5'-P-CCNC: 23 U/L (ref 1–41)
ANION GAP SERPL CALCULATED.3IONS-SCNC: 8 MMOL/L (ref 5–15)
AST SERPL-CCNC: 36 U/L (ref 1–40)
BASOPHILS # BLD AUTO: NORMAL 10*3/UL
BASOPHILS NFR BLD AUTO: NORMAL %
BILIRUB SERPL-MCNC: 0.4 MG/DL (ref 0–1.2)
BUN SERPL-MCNC: 27 MG/DL (ref 6–20)
BUN/CREAT SERPL: 23.5 (ref 7–25)
CALCIUM SPEC-SCNC: 8.3 MG/DL (ref 8.6–10.5)
CHLORIDE SERPL-SCNC: 101 MMOL/L (ref 98–107)
CO2 SERPL-SCNC: 26 MMOL/L (ref 22–29)
CREAT SERPL-MCNC: 1.15 MG/DL (ref 0.76–1.27)
EGFRCR SERPLBLD CKD-EPI 2021: 80 ML/MIN/1.73
EOSINOPHIL # BLD AUTO: NORMAL 10*3/UL
EOSINOPHIL NFR BLD AUTO: NORMAL %
ERYTHROCYTE [DISTWIDTH] IN BLOOD BY AUTOMATED COUNT: NORMAL %
GLOBULIN UR ELPH-MCNC: 2.7 GM/DL
GLUCOSE SERPL-MCNC: 83 MG/DL (ref 65–99)
HCT VFR BLD AUTO: NORMAL %
HGB BLD-MCNC: NORMAL G/DL
LYMPHOCYTES # BLD AUTO: NORMAL 10*3/UL
LYMPHOCYTES NFR BLD AUTO: NORMAL %
MCH RBC QN AUTO: NORMAL PG
MCHC RBC AUTO-ENTMCNC: NORMAL G/DL
MCV RBC AUTO: NORMAL FL
MONOCYTES # BLD AUTO: NORMAL 10*3/UL
MONOCYTES NFR BLD AUTO: NORMAL %
NEUTROPHILS NFR BLD AUTO: NORMAL %
NEUTROPHILS NFR BLD AUTO: NORMAL %
PLATELET # BLD AUTO: NORMAL 10*3/UL
POTASSIUM SERPL-SCNC: 4.5 MMOL/L (ref 3.5–5.2)
PROT SERPL-MCNC: 5.9 G/DL (ref 6–8.5)
RBC # BLD AUTO: NORMAL 10*6/UL
SODIUM SERPL-SCNC: 135 MMOL/L (ref 136–145)
WBC NRBC COR # BLD: NORMAL 10*3/UL

## 2023-04-11 PROCEDURE — 97166 OT EVAL MOD COMPLEX 45 MIN: CPT

## 2023-04-11 PROCEDURE — 85025 COMPLETE CBC W/AUTO DIFF WBC: CPT | Performed by: PODIATRIST

## 2023-04-11 PROCEDURE — A9270 NON-COVERED ITEM OR SERVICE: HCPCS | Performed by: PODIATRIST

## 2023-04-11 PROCEDURE — 97530 THERAPEUTIC ACTIVITIES: CPT

## 2023-04-11 PROCEDURE — 80053 COMPREHEN METABOLIC PANEL: CPT | Performed by: PODIATRIST

## 2023-04-11 PROCEDURE — 97116 GAIT TRAINING THERAPY: CPT

## 2023-04-11 PROCEDURE — 99024 POSTOP FOLLOW-UP VISIT: CPT | Performed by: PODIATRIST

## 2023-04-11 PROCEDURE — 97162 PT EVAL MOD COMPLEX 30 MIN: CPT

## 2023-04-11 PROCEDURE — 63710000001 OXYCODONE-ACETAMINOPHEN 7.5-325 MG TABLET: Performed by: PODIATRIST

## 2023-04-11 PROCEDURE — 25010000002 HEPARIN (PORCINE) PER 1000 UNITS: Performed by: PODIATRIST

## 2023-04-11 PROCEDURE — 63710000001 SENNOSIDES-DOCUSATE 8.6-50 MG TABLET: Performed by: PODIATRIST

## 2023-04-11 RX ADMIN — DOCUSATE SODIUM 50 MG AND SENNOSIDES 8.6 MG 2 TABLET: 8.6; 5 TABLET, FILM COATED ORAL at 20:32

## 2023-04-11 RX ADMIN — Medication 10 ML: at 20:33

## 2023-04-11 RX ADMIN — OXYCODONE HYDROCHLORIDE AND ACETAMINOPHEN 1 TABLET: 7.5; 325 TABLET ORAL at 03:16

## 2023-04-11 RX ADMIN — HEPARIN SODIUM 5000 UNITS: 5000 INJECTION INTRAVENOUS; SUBCUTANEOUS at 20:32

## 2023-04-11 RX ADMIN — HEPARIN SODIUM 5000 UNITS: 5000 INJECTION INTRAVENOUS; SUBCUTANEOUS at 09:02

## 2023-04-11 RX ADMIN — DOCUSATE SODIUM 50 MG AND SENNOSIDES 8.6 MG 2 TABLET: 8.6; 5 TABLET, FILM COATED ORAL at 09:02

## 2023-04-11 RX ADMIN — OXYCODONE HYDROCHLORIDE AND ACETAMINOPHEN 1 TABLET: 7.5; 325 TABLET ORAL at 15:58

## 2023-04-11 NOTE — THERAPY TREATMENT NOTE
Patient Name: Taylor Le  : 1975    MRN: [id number]                              Today's Date: 2023       Admit Date: 4/10/2023    Visit Dx:     ICD-10-CM ICD-9-CM   1. Charcot arthropathy of midfoot  M14.679 094.0     713.5   2. Impaired mobility and ADLs  Z74.09 V49.89    Z78.9    3. Impaired functional mobility, balance, gait, and endurance  Z74.09 V49.89     Patient Active Problem List   Diagnosis   • Lymphedema   • Class II obesity   • Essential hypertension   • Diabetes mellitus type 2, with complication, on long term insulin pump   • Mixed hyperlipidemia   • Leg edema   • Leg swelling   • Charcot arthropathy of midfoot     Past Medical History:   Diagnosis Date   • Diabetes mellitus    • Hyperlipidemia    • Hypertension      Past Surgical History:   Procedure Laterality Date   • APPENDECTOMY     • CATARACT EXTRACTION Bilateral    • RETINAL DETACHMENT REPAIR Right    • RETINAL DETACHMENT SURGERY Left       General Information     Row Name 23 1550 23 1108       Physical Therapy Time and Intention    Document Type therapy note (daily note)  -CECE evaluation  -CECE    Mode of Treatment individual therapy;physical therapy  -CECE co-treatment;physical therapy;occupational therapy  -CECE    Row Name 23 1550 23 1108       General Information    Patient Profile Reviewed yes  -CECE yes  -CECE    Existing Precautions/Restrictions fall;non-weight bearing  -CECE fall;non-weight bearing  -CECE    Row Name 23 1108          Living Environment    People in Home spouse  -CECE     Row Name 23 1108          Home Main Entrance    Number of Stairs, Main Entrance two  -CECE     Stair Railings, Main Entrance none  -CECE     Row Name 23 1108          Stairs Within Home, Primary    Stairs, Within Home, Primary Pt has upstairs but can stay downstairs if needed; spouse looking into ramp for outside steps. Patient was using knee scooter prior;also has crutches but preferred to knee scooter to  crutches.  -     Number of Stairs, Within Home, Primary other (see comments)  has upstairs;can avoid  -CECE     Row Name 04/11/23 1550 04/11/23 1108       Cognition    Orientation Status (Cognition) oriented x 4  -CECE oriented x 4  -CECE    Row Name 04/11/23 1550 04/11/23 1108       Safety Issues, Functional Mobility    Safety Issues Affecting Function (Mobility) -- insight into deficits/self-awareness;safety precaution awareness  -    Impairments Affecting Function (Mobility) endurance/activity tolerance;strength;visual/perceptual;balance  -CECE balance;endurance/activity tolerance;strength  -CECE          User Key  (r) = Recorded By, (t) = Taken By, (c) = Cosigned By    Initials Name Provider Type    Lisa Cali PT Physical Therapist               Mobility     Row Name 04/11/23 1550 04/11/23 1108       Bed Mobility    Bed Mobility supine-sit;sit-supine  -CECE supine-sit;sit-supine  -CECE    Supine-Sit [place] (Bed Mobility) modified independence  -CECE modified independence  -CECE    Sit-Supine [place] (Bed Mobility) modified independence  -CECE modified independence  -    Assistive Device (Bed Mobility) head of bed elevated  - head of bed elevated;bed rails  -CECE    Row Name 04/11/23 1550          Bed-Chair Transfer    Bed-Chair [place] (Transfers) contact guard  bed to toilet to bed  -     Assistive Device (Bed-Chair Transfers) walker, front-wheeled  -CECE     Row Name 04/11/23 1550 04/11/23 1108       Sit-Stand Transfer    Sit-Stand [place] (Transfers) minimum assist (75% patient effort)  - minimum assist (75% patient effort);verbal cues;nonverbal cues (demo/gesture)  -    Assistive Device (Sit-Stand Transfers) walker, front-wheeled  -CECE walker, front-wheeled  -CECE    Row Name 04/11/23 1550 04/11/23 1108       Gait/Stairs (Locomotion)    [place] Level (Gait) contact guard  - minimum assist (75% patient effort)  -    Assistive Device (Gait) walker, front-wheeled  -CECE walker,  front-wheeled  -    Distance in Feet (Gait) 20ftx2,5ftx2  - 8ft,10ft  -    Row Name 04/11/23 1550 04/11/23 1108       Mobility    Extremity Weight-bearing Status right lower extremity  - right lower extremity  -    Right Lower Extremity (Weight-bearing Status) non weight-bearing (NWB)   - non weight-bearing (NWB)   -          User Key  (r) = Recorded By, (t) = Taken By, (c) = Cosigned By    Initials Name Provider Type    Lisa Cali PT Physical Therapist               Obj/Interventions     [facility] Name 04/11/23 1108          Range of Motion Comprehensive    Comment, General Range of Motion BLE: RLE  unable to assess foot/ankle/ 2/2 splint s/p Charcot foot reconstruction; ROM WFL knee, hip;LLE: AROM WFL  -[facility] Name 04/11/23 1108          Strength Comprehensive (MMT)    Comment, General Manual Muscle Testing (MMT) Assessment BLE: RLE: ankle/foot not tested; 4-/5 grossly knee, hip; LLE: 4/5 grossly  -[facility] Name 04/11/23 1550          Motor Skills    Therapeutic Exercise hip;knee  -[facility] Name 04/11/23 1550          Hip (Therapeutic Exercise)    Hip (Therapeutic Exercise) AROM (active range of motion)  -     Hip AROM (Therapeutic Exercise) bilateral;flexion;extension;20 repititions;sitting  -[facility] Name 04/11/23 1550          Knee (Therapeutic Exercise)    Knee (Therapeutic Exercise) AROM (active range of motion)  -     Knee AROM (Therapeutic Exercise) bilateral;LAQ (long arc quad);sitting;20 repititions  -[facility] Name 04/11/23 1550 04/11/23 1108       Balance    Balance Assessment -- sitting static balance;sitting dynamic balance;sit to stand dynamic balance;standing static balance;standing dynamic balance  -    Static Sitting Balance independent  - independent  -    Dynamic Sitting Balance independent  - standby assist  -    Position, Sitting Balance -- sitting edge of bed  -    Sit to Stand Dynamic Balance minimal assist  - minimal assist;verbal cues;non-verbal  "cues (demo/gesture)  -CECE    Static Standing Balance standby assist  -CECE contact guard  -CECE    Dynamic Standing Balance contact guard  -CECE minimal assist;non-verbal cues (demo/gesture);verbal cues  -CECE    Position/Device Used, Standing Balance walker, front-wheeled  -CECE walker, front-wheeled  -CECE    Row Name 04/11/23 1108          Sensory Assessment (Somatosensory)    Sensory Assessment (Somatosensory) other (see comments)  numbness in feet/lower legs 2/2 \"neuropathy\"  -           User Key  (r) = Recorded By, (t) = Taken By, (c) = Cosigned By    Initials Name Provider Type    Lisa Cali, PT Physical Therapist               Goals/Plan     Row Name 04/11/23 1550 04/11/23 1108       Bed Mobility Goal 1 (PT)    Activity/Assistive Device (Bed Mobility Goal 1, PT) sit to supine;supine to sit  -CECE sit to supine;supine to sit  -CECE    [place] Level/Cues Needed (Bed Mobility Goal 1, PT) independent  -CECE independent  -CECE    Time Frame (Bed Mobility Goal 1, PT) by discharge  -CECE by discharge  -CECE    Progress/Outcomes (Bed Mobility Goal 1, PT) goal not met;continuing progress toward goal  -CECE goal not met  -CECE    Row Name 04/11/23 1550 04/11/23 1108       Transfer Goal 1 (PT)    Activity/Assistive Device (Transfer Goal 1, PT) sit-to-stand/stand-to-sit;bed-to-chair/chair-to-bed;walker, rolling  -CECE sit-to-stand/stand-to-sit;bed-to-chair/chair-to-bed;walker, rolling  -CECE    [place] Level/Cues Needed (Transfer Goal 1, PT) standby assist;modified independence  -CECE standby assist;modified independence  -CECE    Time Frame (Transfer Goal 1, PT) by discharge  -CECE by discharge  -CECE    Progress/Outcome (Transfer Goal 1, PT) goal not met  -CECE goal not met  -    Row Name 04/11/23 1550 04/11/23 1108       Gait Training Goal 1 (PT)    Activity/Assistive Device (Gait Training Goal 1, PT) gait (walking locomotion);assistive device use;decrease fall risk;increase endurance/gait distance  - gait (walking locomotion);assistive " device use;decrease fall risk;increase endurance/gait distance  -CECE    [place] Level (Gait Training Goal 1, PT) modified independence  -CECE modified independence  -CECE    Distance (Gait Training Goal 1, PT) 50ft each trip  -CECE 50ft each trip  -CECE    Time Frame (Gait Training Goal 1, PT) 3 days  -CECE 3 days  -CECE    Progress/Outcome (Gait Training Goal 1, PT) goal not met;good progress toward goal  -CECE goal not met  -CECE    Row Name 04/11/23 1550 04/11/23 1108       Stairs Goal 1 (PT)    Activity/Assistive Device (Stairs Goal 1, PT) ascending stairs;descending stairs;assistive device use  -CECE ascending stairs;descending stairs;assistive device use  -CECE    [place] Level/Cues Needed (Stairs Goal 1, PT) minimum assist (75% or more patient effort);contact guard required;standby assist  -CECE minimum assist (75% or more patient effort);contact guard required;standby assist  -CECE    Number of Stairs (Stairs Goal 1, PT) 2  -CECE 2  -CECE    Time Frame (Stairs Goal 1, PT) 3 days  -CECE 3 days  -CECE    Progress/Outcome (Stairs Goal 1, PT) goal not met  -CECE goal not met  -CECE    Row Name 04/11/23 1108          Therapy Assessment/Plan (PT)    Planned Therapy Interventions (PT) balance training;bed mobility training;gait training;home exercise program;patient/family education;stair training;strengthening;transfer training;other (see comments)  ramp training  -           User Key  (r) = Recorded By, (t) = Taken By, (c) = Cosigned By    Initials Name Provider Type    Lisa Cali, PT Physical Therapist               Clinical Impression     Row Name 04/11/23 1550 04/11/23 1108       Pain    Pretreatment Pain Rating 5/10  -CECE 3/10  -CECE    Posttreatment Pain Rating 4/10  -CECE 3/10  -CECE    Pain Location - Side/Orientation Right  - --    Pain Location lower  -CECE lower  -CECE    Pain Location - extremity  -CECE extremity  -CECE    Pain Intervention(s) Medication (See MAR);Rest;Repositioned;Ambulation/increased activity  -  Repositioned;Rest;Elevated  -    Additional Documentation Pain Scale: Numbers Pre/Post-Treatment (Group)  - Pain Scale: Numbers Pre/Post-Treatment (Group)  -    Row Name 04/11/23 1550 04/11/23 1108       Plan of Care Review    Plan of Care Reviewed With patient  -CECE patient  -    Outcome Evaluation PT treatment completed. Pt awake, alert, and agreeable to therapy. Patient reports still has numbness in LUE. Pt transferred sit to stand min assistance and sit to stand with FWW with SBA. Pt ambulated 5ftx2 with FWW to/from bathroom with CGA. Pt ambulated with FWW 20ftx2 with CGA. Patient did well maintaining NWB RLE and demonstrated improved tolerance for mobility. Patient has not met any goals but demonstrates good progress with mobility goals and will benefit from continued PT to improve transfer and gait ability. Anticipate home with assist at discharge and HHPT as needed.  - PT evaluation completed as co-eval with OT. Pt awake,alert, and oriented x4, and agreeable to therapy. Pt still having some numbness LUE s/p surgery(Patient reports he informed podiatrist during rounding; RN was notified). Pt transferred supine to sit to supine with modifed independence with HOB elevated. Patient transferred sit to stand to sit with FWW and min assistance of 1. Patient ambulated 5ftx2 and 10ft with FWW and min assistance of 1. Patient attempted to ambulate to bathroom but was winded and fatigued at doorway and needed to return to EOB. Patient did well maintaining NWB RLE. Function limited by decreased strength, balance, and tolerance for functional mobility and activites s/p R Charcot foot reconstruction. Patient will benefit from PT to improve strength, balance, and tolerance for improved transfer and gait ability, and to decrease fall risk. PT recommends 5 inch wheeled FWW. Anticipate home  with assist at discharge with HHPT.  -    Row Name 04/11/23 1550 04/11/23 1108       Therapy Assessment/Plan (PT)     Patient/Family Therapy Goals Statement (PT) -- improve conditioning for mobility  -    Rehab Potential (PT) -- good, to achieve stated therapy goals  -CECE    Criteria for Skilled Interventions Met (PT) -- yes;meets criteria;skilled treatment is necessary  -CECE    Therapy Frequency (PT) daily  x2  -CECE daily  -CECE    Predicted Duration of Therapy Intervention (PT) -- until discharged or goals met  -    Row Name 04/11/23 1550 04/11/23 1108       Vital Signs    Pre Systolic BP Rehab 107  -CECE 122  -CECE    Pre Treatment Diastolic BP 58  -CECE 58  -CECE    Intra Systolic BP Rehab -- 130  -CECE    Intra Treatment Diastolic BP -- 58  -CECE    Pretreatment Heart Rate (beats/min) 93  -CECE 97  -CECE    Intratreatment Heart Rate (beats/min) 109  -CECE 100  -CECE    Posttreatment Heart Rate (beats/min) 102  -CECE 97  -CECE    Pre SpO2 (%) 97  -CECE 97  -CECE    O2 Delivery Pre Treatment room air  -CECE room air  -CECE    Intra SpO2 (%) 97  -CECE 97  -CECE    O2 Delivery Intra Treatment room air  -CECE room air  -CECE    Post SpO2 (%) 97  -CECE --    O2 Delivery Post Treatment room air  -CECE --    Pre Patient Position Supine  -CECE Supine  -CECE    Intra Patient Position Sitting  -CECE Sitting  -CECE    Post Patient Position Supine  -CECE --    Row Name 04/11/23 1550 04/11/23 1108       Positioning and Restraints    Pre-Treatment Position in bed  -CECE in bed  -CECE    Post Treatment Position bed  -CECE bed  -CECE    In Bed fowlers;call light within reach;encouraged to call for assist;exit alarm on;RLE elevated;with family/caregiver  - fowlers;encouraged to call for assist;exit alarm on;RLE elevated  -          User Key  (r) = Recorded By, (t) = Taken By, (c) = Cosigned By    Initials Name Provider Type    Lisa Cali, PT Physical Therapist               Outcome Measures     Row Name 04/11/23 1550 04/11/23 1108       How much help from another person do you currently need...    Turning from your back to your side while in flat bed without using bedrails? 4  -CECE 4  -CECE     Moving from lying on back to sitting on the side of a flat bed without bedrails? 3  -CECE 3  -CECE    Moving to and from a bed to a chair (including a wheelchair)? 3  -CECE 3  -CECE    Standing up from a chair using your arms (e.g., wheelchair, bedside chair)? 3  -CECE 3  -CECE    Climbing 3-5 steps with a railing? 2  -CECE 2  -CECE    To walk in hospital room? 3  -CECE 3  -CECE    AM-PAC 6 Clicks Score (PT) 18  -CECE 18  -CECE    Highest level of mobility 6 --> Walked 10 steps or more  -CECE 6 --> Walked 10 steps or more  -    Row Name 04/11/23 0830          How much help from another person do you currently need...    Turning from your back to your side while in flat bed without using bedrails? 4  -SN     Moving from lying on back to sitting on the side of a flat bed without bedrails? 3  -SN     Moving to and from a bed to a chair (including a wheelchair)? 2  -SN     Standing up from a chair using your arms (e.g., wheelchair, bedside chair)? 2  -SN     Climbing 3-5 steps with a railing? 1  -SN     To walk in hospital room? 1  -SN     AM-PAC 6 Clicks Score (PT) 13  -SN     Highest level of mobility 4 --> Transferred to chair/commode  -     Row Name 04/11/23 1550 04/11/23 1110       Functional Assessment    Outcome Measure Options AM-PAC 6 Clicks Basic Mobility (PT)  - AM-PAC 6 Clicks Daily Activity (OT)  -    Row Name 04/11/23 1108          Functional Assessment    Outcome Measure Options AM-PAC 6 Clicks Basic Mobility (PT)  -           User Key  (r) = Recorded By, (t) = Taken By, (c) = Cosigned By    Initials Name Provider Type    Lisa Cali PT Physical Therapist    Caity Barrios OT Occupational Therapist    Grayson Pendleton RN Registered Nurse                             Physical Therapy Education     Title: PT OT SLP Therapies (In Progress)     Topic: Physical Therapy (In Progress)     Point: Mobility training (In Progress)     Learning Progress Summary           Patient Acceptance, E, NR by  at 4/11/2023 1450                    Point: Home exercise program (Not Started)     Learner Progress:  Not documented in this visit.          Point: Body mechanics (In Progress)     Learning Progress Summary           Patient Acceptance, E, NR by CECE at 4/11/2023 1315                   Point: Precautions (In Progress)     Learning Progress Summary           Patient Acceptance, E, NR by CECE at 4/11/2023 1315                               User Key     Initials Effective Dates Name Provider Type Discipline     06/16/21 -  Lisa Ho PT Physical Therapist PT              PT Recommendation and Plan  Planned Therapy Interventions (PT): balance training, bed mobility training, gait training, home exercise program, patient/family education, stair training, strengthening, transfer training, other (see comments) (ramp training)  Plan of Care Reviewed With: patient  Outcome Evaluation: PT treatment completed. Pt awake, alert, and agreeable to therapy. Patient reports still has numbness in LUE. Pt transferred sit to stand min assistance and sit to stand with FWW with SBA. Pt ambulated 5ftx2 with FWW to/from bathroom with CGA. Pt ambulated with FWW 20ftx2 with CGA. Patient did well maintaining NWB RLE and demonstrated improved tolerance for mobility. Patient has not met any goals but demonstrates good progress with mobility goals and will benefit from continued PT to improve transfer and gait ability. Anticipate home with assist at discharge and HHPT as needed.     Time Calculation:    PT Charges     Row Name 04/11/23 1706 04/11/23 1442          Time Calculation    Start Time 1550  -CECE 1108  -CECE     Stop Time 1645  -CECE 1204  -CECE     Time Calculation (min) 55 min  -CECE 56 min  -CECE     PT Received On 04/11/23  -CECE 04/11/23  -CECE     PT Goal Re-Cert Due Date 04/24/23  -CECE 04/24/23  -CECE        Time Calculation- PT    Total Timed Code Minutes- PT 55 minute(s)  -CECE 0 minute(s)  -CECE        Timed Charges    10477 - Gait Training Minutes  30  -CECE --      32217 - PT Therapeutic Activity Minutes 25  -CECE --        Untimed Charges    PT Eval/Re-eval Minutes -- 56  -CECE        Total Minutes    Timed Charges Total Minutes 55  -CECE --     Untimed Charges Total Minutes -- 56  -CECE      Total Minutes 55  -CECE 56  -CECE           User Key  (r) = Recorded By, (t) = Taken By, (c) = Cosigned By    Initials Name Provider Type    CECE Lisa Ho, PT Physical Therapist              Therapy Charges for Today     Code Description Service Date Service Provider Modifiers Qty    36602592137 HC PT EVAL MOD COMPLEXITY 4 4/11/2023 Lisa Ho, PT GP 1    95869014731 HC GAIT TRAINING EA 15 MIN 4/11/2023 Lisa Ho, PT GP 2    95266642525 HC PT THERAPEUTIC ACT EA 15 MIN 4/11/2023 Lisa Ho, PT GP 2          PT G-Codes  Outcome Measure Options: AM-PAC 6 Clicks Basic Mobility (PT)  AM-PAC 6 Clicks Score (PT): 18  AM-PAC 6 Clicks Score (OT): 18  PT Discharge Summary  Anticipated Discharge Disposition (PT): home with assist, home with home health    Lisa Ho, SONNY  4/11/2023

## 2023-04-11 NOTE — PROGRESS NOTES
Podiatric Surgery Progress Note    Subjective     Post-Operative Day: 1 post-right foot charcot reconstruction  Systemic or Specific Complaints: No Complaints    Objective     Vital signs in last 24 hours:  Temp:  [97 °F (36.1 °C)-99.3 °F (37.4 °C)] 98.4 °F (36.9 °C)  Heart Rate:  [] 91  Resp:  [12-20] 16  BP: (116-153)/(53-64) 124/58    General: alert, appears stated age and cooperative   Neurovascular: SILT  Capillary refill: Normal   Wound: Dressing intact with strikethrough       DVT Exam: No evidence of DVT seen on physical exam.     Data Review  CBC:      Assessment & Plan     Charcot foot    Patient doing well postoperatively.  To be evaluated by PT/OT today.  Nonweightbearing right lower extremity.  Pain adequately controlled on current regimen.  Start heparin subcu.  I expect patient to be discharged in the next 1 to 3 days.  Call with questions.      LOS: 0 days     Quentin Ceballos DPM    Date: 4/11/2023  Time: 09:53 CDT

## 2023-04-11 NOTE — PLAN OF CARE
Goal Outcome Evaluation:  Plan of Care Reviewed With: patient        Progress: no change  Outcome Evaluation: OT eval completed, co-eval with PT. Pt fowlers in bed upon entering and agreeable to the session. Pt is mod I for bed mobility with the HOB elevated. Pt BUE MMT 4-/5 grossly except L hand 3+/5. Pt reports that his hand feels numb from the elbow to digits 2-4. Pt reports that this is new s/p surgery. Fine motor coordination in his L hand is minimally impaired during finger opposition. Pts RN and Dr. Ceballos notified. Pt min A for sit<>stand t/f with RW while maintaining NWB in the RLE. Pt min A for taking steps next to the bed with RW. Pt demonstrates decreased fxnl mobility, strength, fine motor coordination, and independence in ADLs. Pt would benefit from skilled OT services. Recommend home with assist, HHOT and outpatient OT services if the fxn in his L hand does not improve. Pt will also need a FWW at d/c.

## 2023-04-11 NOTE — PLAN OF CARE
Goal Outcome Evaluation:               Patient instructed on pain scale and opioid pain medication use, instructed to request pain medication prior to onset of severe pain, verbalized understaning.

## 2023-04-11 NOTE — THERAPY EVALUATION
Patient Name: Taylor Le  : 1975    MRN: [id number]                              Today's Date: 2023       Admit Date: 4/10/2023    Visit Dx:     ICD-10-CM ICD-9-CM   1. Charcot arthropathy of midfoot  M14.679 094.0     713.5   2. Impaired mobility and ADLs  Z74.09 V49.89    Z78.9      Patient Active Problem List   Diagnosis   • Lymphedema   • Class II obesity   • Essential hypertension   • Diabetes mellitus type 2, with complication, on long term insulin pump   • Mixed hyperlipidemia   • Leg edema   • Leg swelling   • Charcot arthropathy of midfoot     Past Medical History:   Diagnosis Date   • Diabetes mellitus    • Hyperlipidemia    • Hypertension      Past Surgical History:   Procedure Laterality Date   • APPENDECTOMY     • CATARACT EXTRACTION Bilateral    • RETINAL DETACHMENT REPAIR Right    • RETINAL DETACHMENT SURGERY Left       General Information     Row Name 23 1110          OT Time and Intention    Document Type evaluation  -     Mode of Treatment co-treatment;physical therapy;occupational therapy  -     Row Name 23 1110          General Information    Patient Profile Reviewed yes  -     Prior Level of Function independent:;bathing;dressing;ADL's;all household mobility  -     Existing Precautions/Restrictions fall;non-weight bearing  -     Row Name 23 1110          Living Environment    People in Home spouse  -     Row Name 23 1110          Home Main Entrance    Number of Stairs, Main Entrance two  -     Stair Railings, Main Entrance none  -     Row Name 23 1110          Stairs Within Home, Primary    Stairs, Within Home, Primary Pt has upstairs but can stay downstairs if needed. Had been using a knee scooter. Walk in shower. Pts spouse looking into a ramp for outside steps and shower chair.  -     Number of Stairs, Within Home, Primary other (see comments)  upstairs  -     Row Name 23 1110          Cognition    Orientation  Status (Cognition) oriented x 4  -     Row Name 04/11/23 1110          Safety Issues, Functional Mobility    Impairments Affecting Function (Mobility) endurance/activity tolerance;strength;visual/perceptual  -           User Key  (r) = Recorded By, (t) = Taken By, (c) = Cosigned By    Initials Name Provider Type    Caity Barrios OT Occupational Therapist                 Mobility/ADL's     Row Name 04/11/23 1110          Bed Mobility    Bed Mobility supine-sit;sit-supine  -     Supine-Sit [place] (Bed Mobility) modified independence  -     Sit-Supine [place] (Bed Mobility) modified independence  -     Assistive Device (Bed Mobility) head of bed elevated;bed rails  -     Row Name 04/11/23 1110          Transfers    Transfers sit-stand transfer;stand-sit transfer  -     Row Name 04/11/23 1110          Sit-Stand Transfer    Sit-Stand [place] (Transfers) minimum assist (75% patient effort)  -     Assistive Device (Sit-Stand Transfers) walker, front-wheeled  -     Row Name 04/11/23 1110          Stand-Sit Transfer    Stand-Sit [place] (Transfers) minimum assist (75% patient effort)  -     Assistive Device (Stand-Sit Transfers) walker, front-wheeled  -     Row Name 04/11/23 1110          Activities of Daily Living    BADL Assessment/Intervention lower body dressing  -     Row Name 04/11/23 1110          Mobility    Extremity Weight-bearing Status right lower extremity  -     Right Lower Extremity (Weight-bearing Status) non weight-bearing (NWB)  -     Row Name 04/11/23 1110          Lower Body Dressing Assessment/Training    [place] Level (Lower Body Dressing) don;doff;socks;dependent (less than 25% patient effort)  -     Position (Lower Body Dressing) edge of bed sitting  -           User Key  (r) = Recorded By, (t) = Taken By, (c) = Cosigned By    Initials Name Provider Type    Caity Barrios OT Occupational Therapist               Obj/Interventions     No documentation.                Goals/Plan     Row Name 04/11/23 1110          Transfer Goal 1 (OT)    Activity/Assistive Device (Transfer Goal 1, OT) transfers, all;walker, rolling  -     [place] Level/Cues Needed (Transfer Goal 1, OT) modified independence  -MC     Time Frame (Transfer Goal 1, OT) long term goal (LTG);by discharge  -     Progress/Outcome (Transfer Goal 1, OT) goal not met  -     Row Name 04/11/23 1110          Dressing Goal 1 (OT)    Activity/Device (Dressing Goal 1, OT) lower body dressing  -     [place]/Cues Needed (Dressing Goal 1, OT) independent  -     Time Frame (Dressing Goal 1, OT) long term goal (LTG);by discharge  -     Strategies/Barriers (Dressing Goal 1, OT) AD as needed  -     Progress/Outcome (Dressing Goal 1, OT) goal not met  -[facility] Name 04/11/23 1110          Toileting Goal 1 (OT)    Activity/Device (Toileting Goal 1, OT) toileting skills, all  -     [place] Level/Cues Needed (Toileting Goal 1, OT) independent  -     Time Frame (Toileting Goal 1, OT) long term goal (LTG);by discharge  -     Progress/Outcome (Toileting Goal 1, OT) goal not met  -     Row Name 04/11/23 1110          Strength Goal 1 (OT)    Strength Goal 1 (OT) Pt will increase fine motor coordination and  strength in the L hand to improve independence in ADLs.  -     Time Frame (Strength Goal 1, OT) long term goal (LTG);by discharge  -     Progress/Outcome (Strength Goal 1, OT) goal not met  -     Row Name 04/11/23 1110          Therapy Assessment/Plan (OT)    Planned Therapy Interventions (OT) activity tolerance training;adaptive equipment training;BADL retraining;cognitive/visual perception retraining;edema control/reduction;functional balance retraining;IADL retraining;passive ROM/stretching;manual therapy/joint mobilization;occupation/activity based interventions;orthotic fabrication/fitting/training;transfer/mobility retraining;ROM/therapeutic  exercise;strengthening exercise;patient/caregiver education/training  -           User Key  (r) = Recorded By, (t) = Taken By, (c) = Cosigned By    Initials Name Provider Type     Caity Antony, MALVIN Occupational Therapist               Clinical Impression     Row Name 04/11/23 1110          Pain Assessment    Pretreatment Pain Rating 3/10  -     Pain Location - Side/Orientation Right  -     Pain Location lower  -     Pain Location - extremity  -     Row Name 04/11/23 1110          Plan of Care Review    Plan of Care Reviewed With patient  -     Progress no change  -     Outcome Evaluation OT eval completed, co-eval with PT. Pt fowlers in bed upon entering and agreeable to the session. Pt is mod I for bed mobility with the HOB elevated. Pt BUE MMT 4-/5 grossly except L hand 3+/5. Pt reports that his hand feels numb from the elbow to digits 2-4. Pt reports that this is new s/p surgery. Fine motor coordination in his L hand is minimally impaired during finger opposition. Pts RN and Dr. Ceballos notified. Pt min A for sit<>stand t/f with RW while maintaining NWB in the RLE. Pt min A for taking steps next to the bed with RW. Pt demonstrates decreased fxnl mobility, strength, fine motor coordination, and independence in ADLs. Pt would benefit from skilled OT services. Recommend home with assist, HHOT and outpatient OT services if the fxn in his L hand does not improve. Pt will also need a FWW at d/c.  -     Row Name 04/11/23 1110          Therapy Assessment/Plan (OT)    Patient/Family Therapy Goal Statement (OT) return home  -     Rehab Potential (OT) good, to achieve stated therapy goals  -     Criteria for Skilled Therapeutic Interventions Met (OT) yes;skilled treatment is necessary;meets criteria  -     Therapy Frequency (OT) daily  5-7 d/wk  -     Predicted Duration of Therapy Intervention (OT) until all goals met or d/c  -     Row Name 04/11/23 1110          Therapy Plan Review/Discharge  Plan (OT)    Equipment Needs Upon Discharge (OT) walker, rolling  -RADHA     Anticipated Discharge Disposition (OT) home with assist;home with home health;home with outpatient therapy services  -     Row Name 04/11/23 1110          Vital Signs    Pre Systolic BP Rehab 122  -MC     Pre Treatment Diastolic BP 58  -MC     Intra Systolic BP Rehab 130  -MC     Intra Treatment Diastolic BP 58  -MC     Pretreatment Heart Rate (beats/min) 97  -MC     Intratreatment Heart Rate (beats/min) 100  -MC     Pre SpO2 (%) 97  -MC     O2 Delivery Pre Treatment room air  -     Intra SpO2 (%) 97  -MC     Pre Patient Position Supine  -     Intra Patient Position Sitting  -     Row Name 04/11/23 1110          Positioning and Restraints    Pre-Treatment Position in bed  -     Post Treatment Position bed  -MC     In Bed fowlers;encouraged to call for assist;exit alarm on;RLE elevated  -           User Key  (r) = Recorded By, (t) = Taken By, (c) = Cosigned By    Initials Name Provider Type    Caity Barrios OT Occupational Therapist               Outcome Measures     Row Name 04/11/23 1110          How much help from another is currently needed...    Putting on and taking off regular lower body clothing? 2  -MC     Bathing (including washing, rinsing, and drying) 2  -MC     Toileting (which includes using toilet bed pan or urinal) 2  -MC     Putting on and taking off regular upper body clothing 4  -MC     Taking care of personal grooming (such as brushing teeth) 4  -MC     Eating meals 4  -MC     AM-PAC 6 Clicks Score (OT) 18  -MC     Row Name 04/11/23 0830          How much help from another person do you currently need...    Turning from your back to your side while in flat bed without using bedrails? 4  -SN     Moving from lying on back to sitting on the side of a flat bed without bedrails? 3  -SN     Moving to and from a bed to a chair (including a wheelchair)? 2  -SN     Standing up from a chair using your arms (e.g.,  wheelchair, bedside chair)? 2  -SN     Climbing 3-5 steps with a railing? 1  -SN     To walk in hospital room? 1  -SN     AM-PAC 6 Clicks Score (PT) 13  -SN     Row Name 04/11/23 1110          Functional Assessment    Outcome Measure Options AM-PAC 6 Clicks Daily Activity (OT)  -           User Key  (r) = Recorded By, (t) = Taken By, (c) = Cosigned By    Initials Name Provider Type     Caity Antony OT Occupational Therapist    Grayson Pendleton RN Registered Nurse                Occupational Therapy Education     Title: PT OT SLP Therapies (In Progress)     Topic: Occupational Therapy (In Progress)     Point: ADL training (Done)     Description:   Instruct learner(s) on proper safety adaptation and remediation techniques during self care or transfers.   Instruct in proper use of assistive devices.              Learning Progress Summary           Patient Acceptance, E,TB, VU by  at 4/11/2023 1300    Comment: OT role, POC, safety precautions                   Point: Home exercise program (Not Started)     Description:   Instruct learner(s) on appropriate technique for monitoring, assisting and/or progressing therapeutic exercises/activities.              Learner Progress:  Not documented in this visit.          Point: Precautions (Done)     Description:   Instruct learner(s) on prescribed precautions during self-care and functional transfers.              Learning Progress Summary           Patient Acceptance, E,TB, VU by  at 4/11/2023 1300    Comment: OT role, POC, safety precautions                   Point: Body mechanics (Done)     Description:   Instruct learner(s) on proper positioning and spine alignment during self-care, functional mobility activities and/or exercises.              Learning Progress Summary           Patient Acceptance, E,TB, VU by  at 4/11/2023 1300    Comment: OT role, POC, safety precautions                               User Key     Initials Effective Dates Name Provider Type  Discipline     10/19/22 -  Caity Antony OT Occupational Therapist OT              OT Recommendation and Plan  Planned Therapy Interventions (OT): activity tolerance training, adaptive equipment training, BADL retraining, cognitive/visual perception retraining, edema control/reduction, functional balance retraining, IADL retraining, passive ROM/stretching, manual therapy/joint mobilization, occupation/activity based interventions, orthotic fabrication/fitting/training, transfer/mobility retraining, ROM/therapeutic exercise, strengthening exercise, patient/caregiver education/training  Therapy Frequency (OT): daily (5-7 d/wk)  Plan of Care Review  Plan of Care Reviewed With: patient  Progress: no change  Outcome Evaluation: OT eval completed, co-eval with PT. Pt fowlers in bed upon entering and agreeable to the session. Pt is mod I for bed mobility with the HOB elevated. Pt BUE MMT 4-/5 grossly except L hand 3+/5. Pt reports that his hand feels numb from the elbow to digits 2-4. Pt reports that this is new s/p surgery. Fine motor coordination in his L hand is minimally impaired during finger opposition. Pts RN and Dr. Ceballos notified. Pt min A for sit<>stand t/f with RW while maintaining NWB in the RLE. Pt min A for taking steps next to the bed with RW. Pt demonstrates decreased fxnl mobility, strength, fine motor coordination, and independence in ADLs. Pt would benefit from skilled OT services. Recommend home with assist, HHOT and outpatient OT services if the fxn in his L hand does not improve. Pt will also need a FWW at d/c.     Time Calculation:    Time Calculation- OT     Row Name 04/11/23 1110             Time Calculation- OT    OT Start Time 1109  -      OT Stop Time 1154  -      OT Time Calculation (min) 45 min  -      OT Received On 04/11/23  -      OT Goal Re-Cert Due Date 04/24/23  -         Untimed Charges    OT Eval/Re-eval Minutes 45  -         Total Minutes    Untimed Charges Total  Minutes 45  -MC       Total Minutes 45  -MC            User Key  (r) = Recorded By, (t) = Taken By, (c) = Cosigned By    Initials Name Provider Type    Caity Barrios OT Occupational Therapist              Therapy Charges for Today     Code Description Service Date Service Provider Modifiers Qty    [id number] HC OT EVAL MOD COMPLEXITY 3 4/11/2023 Caity Antony OT GO 1               Caity Antony OT  4/11/2023

## 2023-04-11 NOTE — PLAN OF CARE
Goal Outcome Evaluation:  Plan of Care Reviewed With: patient        Progress: no change  Outcome Evaluation: Pt has been resting off and on throughout the night. Dressing to RL extremity has a small amount of dried bloody drainage on outside of dressing. Dressing dry and intact. Patient foot is elevated on leg wedge. Patient has had some aching pain at a 4 on the numerical scale. PRN oral pain med given per doctors orders. Patient is on a regular diabetic diet. Ate a sandwich and diet drink. Tolerated well. Voided using the urinal without difficulty. Patient on bedrest and is tolerating well. Patient has a continous insulin pump and is informing nurse of BS and coverage per pump. Patient has signed insulin pump consent form and flowsheet is at bedside. Will continue to monitor patient.

## 2023-04-11 NOTE — PLAN OF CARE
Problem: Adult Inpatient Plan of Care  Goal: Plan of Care Review  Recent Flowsheet Documentation  Taken 4/11/2023 1550 by Lisa Ho PT  Plan of Care Reviewed With: patient  Outcome Evaluation: PT treatment completed. Pt awake, alert, and agreeable to therapy. Patient reports still has numbness in LUE. Pt transferred sit to stand min assistance and sit to stand with FWW with SBA. Pt ambulated 5ftx2 with FWW to/from bathroom with CGA. Pt ambulated with FWW 20ftx2 with CGA. Patient did well maintaining NWB RLE and demonstrated improved tolerance for mobility. Patient has not met any goals but demonstrates good progress with mobility goals and will benefit from continued PT to improve transfer and gait ability. Anticipate home with assist at discharge and HHPT as needed.  Taken 4/11/2023 1108 by Lisa Ho PT  Plan of Care Reviewed With: patient  Outcome Evaluation:   PT evaluation completed as co-eval with OT. Pt awake,alert, and oriented x4, and agreeable to therapy. Pt still having some numbness LUE s/p surgery(Patient reports he informed podiatrist during rounding   RN was notified). Pt transferred supine to sit to supine with modifed independence with HOB elevated. Patient transferred sit to stand to sit with FWW and min assistance of 1. Patient ambulated 5ftx2 and 10ft with FWW and min assistance of 1. Patient attempted to ambulate to bathroom but was winded and fatigued at doorway and needed to return to EOB. Patient did well maintaining NWB RLE. Function limited by decreased strength, balance, and tolerance for functional mobility and activites s/p R Charcot foot reconstruction. Patient will benefit from PT to improve strength, balance, and tolerance for improved transfer and gait ability, and to decrease fall risk. PT recommends 5 inch wheeled FWW. Anticipate home  with assist at discharge with HHPT.   Goal Outcome Evaluation:  Plan of Care Reviewed With: patient           Outcome Evaluation: PT  treatment completed. Pt awake, alert, and agreeable to therapy. Patient reports still has numbness in LUE. Pt transferred sit to stand min assistance and sit to stand with FWW with SBA. Pt ambulated 5ftx2 with FWW to/from bathroom with CGA. Pt ambulated with FWW 20ftx2 with CGA. Patient did well maintaining NWB RLE and demonstrated improved tolerance for mobility. Patient has not met any goals but demonstrates good progress with mobility goals and will benefit from continued PT to improve transfer and gait ability. Anticipate home with assist at discharge and HHPT as needed.

## 2023-04-11 NOTE — NURSING NOTE
Pt has own continuous insulin pump and dexcom. Pt wishes to use own insulin pump and dexcom vs. prescribed insulin. Pt is notifying this RN for BS readings and coverage. Pt. educated on documentation and using own pump. Pt. verbalized understanding.

## 2023-04-11 NOTE — THERAPY EVALUATION
Patient Name: Taylor Le  : 1975    MRN: [id number]                              Today's Date: 2023       Admit Date: 4/10/2023    Visit Dx:     ICD-10-CM ICD-9-CM   1. Charcot arthropathy of midfoot  M14.679 094.0     713.5   2. Impaired mobility and ADLs  Z74.09 V49.89    Z78.9    3. Impaired functional mobility, balance, gait, and endurance  Z74.09 V49.89     Patient Active Problem List   Diagnosis   • Lymphedema   • Class II obesity   • Essential hypertension   • Diabetes mellitus type 2, with complication, on long term insulin pump   • Mixed hyperlipidemia   • Leg edema   • Leg swelling   • Charcot arthropathy of midfoot     Past Medical History:   Diagnosis Date   • Diabetes mellitus    • Hyperlipidemia    • Hypertension      Past Surgical History:   Procedure Laterality Date   • APPENDECTOMY     • CATARACT EXTRACTION Bilateral    • RETINAL DETACHMENT REPAIR Right    • RETINAL DETACHMENT SURGERY Left       General Information     Row Name 23 1108          Physical Therapy Time and Intention    Document Type evaluation  -CECE     Mode of Treatment co-treatment;physical therapy;occupational therapy  -CECE     Row Name 23 1108          General Information    Patient Profile Reviewed yes  -CECE     Existing Precautions/Restrictions fall;non-weight bearing  -CECE     Row Name 23 1108          Living Environment    People in Home spouse  -CECE     Row Name 23 1108          Home Main Entrance    Number of Stairs, Main Entrance two  -CECE     Stair Railings, Main Entrance none  -CECE     Row Name 23 1108          Stairs Within Home, Primary    Stairs, Within Home, Primary Pt has upstairs but can stay downstairs if needed; spouse looking into ramp for outside steps. Patient was using knee scooter prior;also has crutches but preferred to knee scooter to crutches.  -CECE     Number of Stairs, Within Home, Primary other (see comments)  has upstairs;can avoid  -CECE     Row Name 23  1108          Cognition    Orientation Status (Cognition) oriented x 4  -     Row Name 04/11/23 1108          Safety Issues, Functional Mobility    Safety Issues Affecting Function (Mobility) insight into deficits/self-awareness;safety precaution awareness  -     Impairments Affecting Function (Mobility) balance;endurance/activity tolerance;strength  -           User Key  (r) = Recorded By, (t) = Taken By, (c) = Cosigned By    Initials Name Provider Type    Lisa Cali, PT Physical Therapist               Mobility     Row Name 04/11/23 1108          Bed Mobility    Bed Mobility supine-sit;sit-supine  -     Supine-Sit [place] (Bed Mobility) modified independence  -     Sit-Supine [place] (Bed Mobility) modified independence  -     Assistive Device (Bed Mobility) head of bed elevated;bed rails  -     Row Name 04/11/23 1108          Sit-Stand Transfer    Sit-Stand [place] (Transfers) minimum assist (75% patient effort);verbal cues;nonverbal cues (demo/gesture)  -     Assistive Device (Sit-Stand Transfers) walker, front-wheeled  -     Row Name 04/11/23 1108          Gait/Stairs (Locomotion)    [place] Level (Gait) minimum assist (75% patient effort)  -     Assistive Device (Gait) walker, front-wheeled  -     Distance in Feet (Gait) 8ft,10ft  -     Row Name 04/11/23 1108          Mobility    Extremity Weight-bearing Status right lower extremity  -     Right Lower Extremity (Weight-bearing Status) non weight-bearing (NWB)   -           User Key  (r) = Recorded By, (t) = Taken By, (c) = Cosigned By    Initials Name Provider Type    Lisa Cali PT Physical Therapist               Obj/Interventions     Row Name 04/11/23 1108          Range of Motion Comprehensive    Comment, General Range of Motion BLE: RLE  unable to assess foot/ankle/ 2/2 splint s/p Charcot foot reconstruction; ROM WFL knee, hip;LLE: AROM WFL  -     Row Name 04/11/23 1108          Strength  "Comprehensive (MMT)    Comment, General Manual Muscle Testing (MMT) Assessment BLE: RLE: ankle/foot not tested; 4-/5 grossly knee, hip; LLE: 4/5 grossly  -     Row Name 04/11/23 1108          Balance    Balance Assessment sitting static balance;sitting dynamic balance;sit to stand dynamic balance;standing static balance;standing dynamic balance  -     Static Sitting Balance independent  -     Dynamic Sitting Balance standby assist  -     Position, Sitting Balance sitting edge of bed  -     Sit to Stand Dynamic Balance minimal assist;verbal cues;non-verbal cues (demo/gesture)  -     Static Standing Balance contact guard  -     Dynamic Standing Balance minimal assist;non-verbal cues (demo/gesture);verbal cues  -     Position/Device Used, Standing Balance walker, front-wheeled  -     Row Name 04/11/23 1108          Sensory Assessment (Somatosensory)    Sensory Assessment (Somatosensory) other (see comments)  numbness in feet/lower legs 2/2 \"neuropathy\"  -           User Key  (r) = Recorded By, (t) = Taken By, (c) = Cosigned By    Initials Name Provider Type    CECE Lisa Ho, PT Physical Therapist               Goals/Plan     Row Name 04/11/23 1108          Bed Mobility Goal 1 (PT)    Activity/Assistive Device (Bed Mobility Goal 1, PT) sit to supine;supine to sit  -     [place] Level/Cues Needed (Bed Mobility Goal 1, PT) independent  -CECE     Time Frame (Bed Mobility Goal 1, PT) by discharge  -     Progress/Outcomes (Bed Mobility Goal 1, PT) goal not met  -     Row Name 04/11/23 1108          Transfer Goal 1 (PT)    Activity/Assistive Device (Transfer Goal 1, PT) sit-to-stand/stand-to-sit;bed-to-chair/chair-to-bed;walker, rolling  -     [place] Level/Cues Needed (Transfer Goal 1, PT) standby assist;modified independence  -     Time Frame (Transfer Goal 1, PT) by discharge  -     Progress/Outcome (Transfer Goal 1, PT) goal not met  -     Row Name 04/11/23 1108          Gait " Training Goal 1 (PT)    Activity/Assistive Device (Gait Training Goal 1, PT) gait (walking locomotion);assistive device use;decrease fall risk;increase endurance/gait distance  -CECE     [place] Level (Gait Training Goal 1, PT) modified independence  -CECE     Distance (Gait Training Goal 1, PT) 50ft each trip  -CECE     Time Frame (Gait Training Goal 1, PT) 3 days  -CECE     Progress/Outcome (Gait Training Goal 1, PT) goal not met  -     Row Name 04/11/23 1108          Stairs Goal 1 (PT)    Activity/Assistive Device (Stairs Goal 1, PT) ascending stairs;descending stairs;assistive device use  -CECE     [place] Level/Cues Needed (Stairs Goal 1, PT) minimum assist (75% or more patient effort);contact guard required;standby assist  -CECE     Number of Stairs (Stairs Goal 1, PT) 2  -CECE     Time Frame (Stairs Goal 1, PT) 3 days  -CECE     Progress/Outcome (Stairs Goal 1, PT) goal not met  -     Row Name 04/11/23 1108          Therapy Assessment/Plan (PT)    Planned Therapy Interventions (PT) balance training;bed mobility training;gait training;home exercise program;patient/family education;stair training;strengthening;transfer training;other (see comments)  ramp training  -           User Key  (r) = Recorded By, (t) = Taken By, (c) = Cosigned By    Initials Name Provider Type    Lisa Clai, PT Physical Therapist               Clinical Impression     Row Name 04/11/23 1108          Pain    Pretreatment Pain Rating 3/10  -     Posttreatment Pain Rating 3/10  -CECE     Pain Location lower  -CECE     Pain Location - extremity  -     Pain Intervention(s) Repositioned;Rest;Elevated  -CECE     Additional Documentation Pain Scale: Numbers Pre/Post-Treatment (Group)  -     Row Name 04/11/23 1108          Plan of Care Review    Plan of Care Reviewed With patient  -     Outcome Evaluation PT evaluation completed as co-eval with OT. Pt awake,alert, and oriented x4, and agreeable to therapy. Pt still having some numbness  AVINASH s/p surgery(Patient reports he informed podiatrist during rounding; RN was notified). Pt transferred supine to sit to supine with modifed independence with HOB elevated. Patient transferred sit to stand to sit with FWW and min assistance of 1. Patient ambulated 5ftx2 and 10ft with FWW and min assistance of 1. Patient attempted to ambulate to bathroom but was winded and fatigued at doorway and needed to return to EOB. Patient did well maintaining NWB RLE. Function limited by decreased strength, balance, and tolerance for functional mobility and activites s/p R Charcot foot reconstruction. Patient will benefit from PT to improve strength, balance, and tolerance for improved transfer and gait ability, and to decrease fall risk. PT recommends 5 inch wheeled FWW. Anticipate home  with assist at discharge with HHPT.  -     Row Name 04/11/23 1108          Therapy Assessment/Plan (PT)    Patient/Family Therapy Goals Statement (PT) improve conditioning for mobility  -     Rehab Potential (PT) good, to achieve stated therapy goals  -     Criteria for Skilled Interventions Met (PT) yes;meets criteria;skilled treatment is necessary  -     Therapy Frequency (PT) daily  -     Predicted Duration of Therapy Intervention (PT) until discharged or goals met  -     Row Name 04/11/23 1108          Vital Signs    Pre Systolic BP Rehab 122  -CECE     Pre Treatment Diastolic BP 58  -CECE     Intra Systolic BP Rehab 130  -CECE     Intra Treatment Diastolic BP 58  -CECE     Pretreatment Heart Rate (beats/min) 97  -CECE     Intratreatment Heart Rate (beats/min) 100  -CECE     Posttreatment Heart Rate (beats/min) 97  -CECE     Pre SpO2 (%) 97  -CECE     O2 Delivery Pre Treatment room air  -CECE     Intra SpO2 (%) 97  -CECE     O2 Delivery Intra Treatment room air  -CECE     Pre Patient Position Supine  -CECE     Intra Patient Position Sitting  -     Row Name 04/11/23 1108          Positioning and Restraints    Pre-Treatment Position in bed  -CECE      Post Treatment Position bed  -CECE     In Bed fowlers;encouraged to call for assist;exit alarm on;RLE elevated  -CECE           User Key  (r) = Recorded By, (t) = Taken By, (c) = Cosigned By    Initials Name Provider Type    Lisa Cali, PT Physical Therapist               Outcome Measures     Row Name 04/11/23 1108 04/11/23 0830       How much help from another person do you currently need...    Turning from your back to your side while in flat bed without using bedrails? 4  -CECE 4  -SN    Moving from lying on back to sitting on the side of a flat bed without bedrails? 3  -CECE 3  -SN    Moving to and from a bed to a chair (including a wheelchair)? 3  -CECE 2  -SN    Standing up from a chair using your arms (e.g., wheelchair, bedside chair)? 3  -CECE 2  -SN    Climbing 3-5 steps with a railing? 2  -CECE 1  -SN    To walk in hospital room? 3  -CECE 1  -SN    AM-PAC 6 Clicks Score (PT) 18  -CECE 13  -SN    Highest level of mobility 6 --> Walked 10 steps or more  -CECE 4 --> Transferred to chair/commode  -SN    Row Name 04/11/23 1110 04/11/23 1108       Functional Assessment    Outcome Measure Options AM-PAC 6 Clicks Daily Activity (OT)  - AM-PAC 6 Clicks Basic Mobility (PT)  -          User Key  (r) = Recorded By, (t) = Taken By, (c) = Cosigned By    Initials Name Provider Type    Lisa Cali, PT Physical Therapist    Caity Barrios OT Occupational Therapist    Grayson Pendleton RN Registered Nurse                             Physical Therapy Education     Title: PT OT SLP Therapies (In Progress)     Topic: Physical Therapy (In Progress)     Point: Mobility training (In Progress)     Learning Progress Summary           Patient Acceptance, E, NR by CECE at 4/11/2023 1315                   Point: Home exercise program (Not Started)     Learner Progress:  Not documented in this visit.          Point: Body mechanics (In Progress)     Learning Progress Summary           Patient Acceptance, E, NR by CECE at 4/11/2023 1315                    Point: Precautions (In Progress)     Learning Progress Summary           Patient Acceptance, E, NR by  at 4/11/2023 0703                               User Key     Initials Effective Dates Name Provider Type Discipline     06/16/21 -  Lisa Ho PT Physical Therapist PT              PT Recommendation and Plan  Planned Therapy Interventions (PT): balance training, bed mobility training, gait training, home exercise program, patient/family education, stair training, strengthening, transfer training, other (see comments) (ramp training)  Plan of Care Reviewed With: patient  Outcome Evaluation: PT evaluation completed as co-eval with OT. Pt awake,alert, and oriented x4, and agreeable to therapy. Pt still having some numbness LUE s/p surgery(Patient reports he informed podiatrist during rounding; RN was notified). Pt transferred supine to sit to supine with modifed independence with HOB elevated. Patient transferred sit to stand to sit with FWW and min assistance of 1. Patient ambulated 5ftx2 and 10ft with FWW and min assistance of 1. Patient attempted to ambulate to bathroom but was winded and fatigued at doorway and needed to return to EOB. Patient did well maintaining NWB RLE. Function limited by decreased strength, balance, and tolerance for functional mobility and activites s/p R Charcot foot reconstruction. Patient will benefit from PT to improve strength, balance, and tolerance for improved transfer and gait ability, and to decrease fall risk. PT recommends 5 inch wheeled FWW. Anticipate home  with assist at discharge with HHPT.     Time Calculation:    PT Charges     Row Name 04/11/23 1442             Time Calculation    Start Time 1108  -      Stop Time 1204  -      Time Calculation (min) 56 min  -      PT Received On 04/11/23  -      PT Goal Re-Cert Due Date 04/24/23  -         Time Calculation- PT    Total Timed Code Minutes- PT 0 minute(s)  -         Untimed Charges     PT Eval/Re-eval Minutes 56  -CECE         Total Minutes    Untimed Charges Total Minutes 56  -CECE       Total Minutes 56  -CECE            User Key  (r) = Recorded By, (t) = Taken By, (c) = Cosigned By    Initials Name Provider Type    Lisa Cali, PT Physical Therapist              Therapy Charges for Today     Code Description Service Date Service Provider Modifiers Qty    74858085890 HC PT EVAL MOD COMPLEXITY 4 4/11/2023 Lisa Ho, PT GP 1          PT G-Codes  Outcome Measure Options: AM-PAC 6 Clicks Daily Activity (OT)  AM-PAC 6 Clicks Score (PT): 18  AM-PAC 6 Clicks Score (OT): 18  PT Discharge Summary  Anticipated Discharge Disposition (PT): home with assist, home with home health    Lisa Ho, PT  4/11/2023

## 2023-04-11 NOTE — PLAN OF CARE
Problem: Adult Inpatient Plan of Care  Goal: Plan of Care Review  Recent Flowsheet Documentation  Taken 4/11/2023 1108 by Lisa Ho PT  Plan of Care Reviewed With: patient  Outcome Evaluation:   PT evaluation completed as co-eval with OT. Pt awake,alert, and oriented x4, and agreeable to therapy. Pt still having some numbness LUE s/p surgery(Patient reports he informed podiatrist during rounding   RN was notified). Pt transferred supine to sit to supine with modifed independence with HOB elevated. Patient transferred sit to stand to sit with FWW and min assistance of 1. Patient ambulated 5ftx2 and 10ft with FWW and min assistance of 1. Patient attempted to ambulate to bathroom but was winded and fatigued at doorway and needed to return to EOB. Patient did well maintaining NWB RLE. Function limited by decreased strength, balance, and tolerance for functional mobility and activites s/p R Charcot foot reconstruction. Patient will benefit from PT to improve strength, balance, and tolerance for improved transfer and gait ability, and to decrease fall risk. PT recommends 5 inch wheeled FWW. Anticipate home  with assist at discharge with HHPT.   Goal Outcome Evaluation:  Plan of Care Reviewed With: patient           Outcome Evaluation: PT evaluation completed as co-eval with OT. Pt awake,alert, and oriented x4, and agreeable to therapy. Pt still having some numbness LUE s/p surgery(Patient reports he informed podiatrist during rounding; RN was notified). Pt transferred supine to sit to supine with modifed independence with HOB elevated. Patient transferred sit to stand to sit with FWW and min assistance of 1. Patient ambulated 5ftx2 and 10ft with FWW and min assistance of 1. Patient attempted to ambulate to bathroom but was winded and fatigued at doorway and needed to return to EOB. Patient did well maintaining NWB RLE. Function limited by decreased strength, balance, and tolerance for functional mobility and  activites s/p R Charcot foot reconstruction. Patient will benefit from PT to improve strength, balance, and tolerance for improved transfer and gait ability, and to decrease fall risk. PT recommends 5 inch wheeled FWW. Anticipate home  with assist at discharge with HHPT.

## 2023-04-12 PROCEDURE — 25010000002 HEPARIN (PORCINE) PER 1000 UNITS: Performed by: PODIATRIST

## 2023-04-12 PROCEDURE — 97530 THERAPEUTIC ACTIVITIES: CPT

## 2023-04-12 PROCEDURE — 97535 SELF CARE MNGMENT TRAINING: CPT

## 2023-04-12 PROCEDURE — 63710000001 SENNOSIDES-DOCUSATE 8.6-50 MG TABLET: Performed by: PODIATRIST

## 2023-04-12 PROCEDURE — 97116 GAIT TRAINING THERAPY: CPT

## 2023-04-12 PROCEDURE — 99024 POSTOP FOLLOW-UP VISIT: CPT | Performed by: PODIATRIST

## 2023-04-12 PROCEDURE — 63710000001 OXYCODONE-ACETAMINOPHEN 7.5-325 MG TABLET: Performed by: PODIATRIST

## 2023-04-12 PROCEDURE — A9270 NON-COVERED ITEM OR SERVICE: HCPCS | Performed by: PODIATRIST

## 2023-04-12 RX ADMIN — HEPARIN SODIUM 5000 UNITS: 5000 INJECTION INTRAVENOUS; SUBCUTANEOUS at 20:57

## 2023-04-12 RX ADMIN — Medication 10 ML: at 21:01

## 2023-04-12 RX ADMIN — OXYCODONE HYDROCHLORIDE AND ACETAMINOPHEN 1 TABLET: 7.5; 325 TABLET ORAL at 13:43

## 2023-04-12 RX ADMIN — HEPARIN SODIUM 5000 UNITS: 5000 INJECTION INTRAVENOUS; SUBCUTANEOUS at 08:57

## 2023-04-12 RX ADMIN — DOCUSATE SODIUM 50 MG AND SENNOSIDES 8.6 MG 2 TABLET: 8.6; 5 TABLET, FILM COATED ORAL at 20:57

## 2023-04-12 RX ADMIN — OXYCODONE HYDROCHLORIDE AND ACETAMINOPHEN 1 TABLET: 7.5; 325 TABLET ORAL at 21:39

## 2023-04-12 RX ADMIN — Medication 10 ML: at 08:57

## 2023-04-12 NOTE — THERAPY TREATMENT NOTE
Acute Care - Occupational Therapy Treatment Note  [facility]     Patient Name: Taylor Le  : 1975  MRN: [id number]  Today's Date: 2023             Admit Date: 4/10/2023       ICD-10-CM ICD-9-CM   1. Charcot arthropathy of midfoot  M14.679 094.0     713.5   2. Impaired mobility and ADLs  Z74.09 V49.89    Z78.9    3. Impaired functional mobility, balance, gait, and endurance  Z74.09 V49.89     Patient Active Problem List   Diagnosis   • Lymphedema   • Class II obesity   • Essential hypertension   • Diabetes mellitus type 2, with complication, on long term insulin pump   • Mixed hyperlipidemia   • Leg edema   • Leg swelling   • Charcot arthropathy of midfoot     Past Medical History:   Diagnosis Date   • Diabetes mellitus    • Hyperlipidemia    • Hypertension      Past Surgical History:   Procedure Laterality Date   • APPENDECTOMY     • CATARACT EXTRACTION Bilateral    • RETINAL DETACHMENT REPAIR Right    • RETINAL DETACHMENT SURGERY Left          OT ASSESSMENT FLOWSHEET (last 12 hours)     OT Evaluation and Treatment     Row Name 23 0959                   OT Time and Intention    Document Type therapy note (daily note)  -RB        Mode of Treatment individual therapy;occupational therapy  -RB           General Information    Patient Profile Reviewed yes  -RB        Existing Precautions/Restrictions fall;non-weight bearing  -RB           Pain Assessment    Pretreatment Pain Rating 0/10 - no pain  -RB        Posttreatment Pain Rating 3/10  -RB        Pain Location - Side/Orientation Right  -RB        Pain Location - foot  -RB           Cognition    Orientation Status (Cognition) oriented x 4  -RB           Activities of Daily Living    BADL Assessment/Intervention lower body dressing  -RB           Lower Body Dressing Assessment/Training    [place] Level (Lower Body Dressing) lower body dressing skills;doff;don;socks;modified independence  -RB        Position (Lower Body Dressing)  edge of bed sitting  -RB           Mobility    Extremity Weight-bearing Status right lower extremity  -RB        Right Lower Extremity (Weight-bearing Status) non weight-bearing (NWB)   -RB           Bed Mobility    Bed Mobility supine-sit;sit-supine  -RB        Supine-Sit [place] (Bed Mobility) modified independence  -RB        Sit-Supine [place] (Bed Mobility) modified independence  -RB        Assistive Device (Bed Mobility) head of bed elevated  -RB           Functional Mobility    Functional Mobility- Ind. Level contact guard assist  -RB        Functional Mobility- Device walker, front-wheeled  -RB        Functional Mobility-Distance (Feet) 30  -RB        Functional Mobility- Safety Issues step length decreased;balance decreased during turns  -RB           Transfer Assessment/Treatment    Transfers toilet transfer  -RB           Bed-Chair Transfer    Bed-Chair [place] (Transfers) --  -RB        Assistive Device (Bed-Chair Transfers) --  -RB           Sit-Stand Transfer    Sit-Stand [place] (Transfers) contact guard  -RB        Assistive Device (Sit-Stand Transfers) walker, front-wheeled  -RB           Stand-Sit Transfer    Stand-Sit [place] (Transfers) contact guard  -RB        Assistive Device (Stand-Sit Transfers) walker, front-wheeled  -RB           Toilet Transfer    Type (Toilet Transfer) sit-stand;stand-sit  -RB        [place] Level (Toilet Transfer) contact guard  -RB        Assistive Device (Toilet Transfer) walker, front-wheeled;raised toilet seat;grab bars/safety frame  -RB           Safety Issues, Functional Mobility    Impairments Affecting Function (Mobility) endurance/activity tolerance;strength;visual/perceptual;balance  -RB           Wound 04/10/23 1200 Right foot Incision    Wound - Properties Group Placement Date: 04/10/23  -EW Placement Time: 1200  -EW Side: Right  -EW Location: foot  -EW Primary Wound Type: Incision  -EW Additional Comments: incision x4  -HC     Retired Wound - Properties Group Placement Date: 04/10/23  -EW Placement Time: 1200  -EW Side: Right  -EW Location: foot  -EW Primary Wound Type: Incision  -EW Additional Comments: incision x4  -HC    Retired Wound - Properties Group Date first assessed: 04/10/23  -EW Time first assessed: 1200  -EW Side: Right  -EW Location: foot  -EW Primary Wound Type: Incision  -EW Additional Comments: incision x4  -HC       Vital Signs    Pre Systolic BP Rehab 129  -RB        Pre Treatment Diastolic BP 60  -RB        Post Systolic BP Rehab 133  -RB        Post Treatment Diastolic BP 59  -RB        Pretreatment Heart Rate (beats/min) 83  -RB        Posttreatment Heart Rate (beats/min) 93  -RB        Pre SpO2 (%) 96  -RB        O2 Delivery Pre Treatment room air  -RB        Post SpO2 (%) 98  -RB        O2 Delivery Post Treatment room air  -RB        Pre Patient Position Supine  -RB        Intra Patient Position Standing  -RB        Post Patient Position Sitting  -RB           Positioning and Restraints    Pre-Treatment Position in bed  -RB        Post Treatment Position chair  -RB        In Chair sitting;RLE elevated;call light within reach  -RB              User Key  (r) = Recorded By, (t) = Taken By, (c) = Cosigned By    Initials Name Effective Dates    RB Jeet Morris OT 06/16/21 -     HC Lou Pino RN 12/23/22 -     EW Magaly Mcfadden, MARLENY 02/15/23 -                  Occupational Therapy Education     Title: PT OT SLP Therapies (In Progress)     Topic: Occupational Therapy (In Progress)     Point: ADL training (Done)     Description:   Instruct learner(s) on proper safety adaptation and remediation techniques during self care or transfers.   Instruct in proper use of assistive devices.              Learning Progress Summary           Patient Acceptance, E,TB, VU by  at 4/11/2023 1300    Comment: OT role, POC, safety precautions                   Point: Home exercise program (Not Started)     Description:   Instruct  learner(s) on appropriate technique for monitoring, assisting and/or progressing therapeutic exercises/activities.              Learner Progress:  Not documented in this visit.          Point: Precautions (Done)     Description:   Instruct learner(s) on prescribed precautions during self-care and functional transfers.              Learning Progress Summary           Patient Acceptance, E, DU by  at 4/12/2023 1301    Comment: Edu pt on use of gait belt and non skid socks when OOB and no OOB without assist.    Acceptance, E,TB, VU by  at 4/11/2023 1300    Comment: OT role, POC, safety precautions                   Point: Body mechanics (Done)     Description:   Instruct learner(s) on proper positioning and spine alignment during self-care, functional mobility activities and/or exercises.              Learning Progress Summary           Patient Acceptance, E,TB, VU by  at 4/11/2023 1300    Comment: OT role, POC, safety precautions                               User Key     Initials Effective Dates Name Provider Type Discipline     06/16/21 -  Jeet Morris, OT Occupational Therapist OT     10/19/22 -  Caity Antony OT Occupational Therapist OT                  OT Recommendation and Plan           Outcome Measures     Row Name 04/12/23 1200 04/12/23 0959          How much help from another person do you currently need...    Turning from your back to your side while in flat bed without using bedrails? 4  -TA --     Moving from lying on back to sitting on the side of a flat bed without bedrails? 3  -TA --     Moving to and from a bed to a chair (including a wheelchair)? 3  -TA --     Standing up from a chair using your arms (e.g., wheelchair, bedside chair)? 3  -TA --     Climbing 3-5 steps with a railing? 2  -TA --     To walk in hospital room? 3  -TA --     AM-PAC 6 Clicks Score (PT) 18  -TA --        How much help from another is currently needed...    Putting on and taking off regular lower body  clothing? -- 2  -RB     Bathing (including washing, rinsing, and drying) -- 2  -RB     Toileting (which includes using toilet bed pan or urinal) -- 2  -RB     Putting on and taking off regular upper body clothing -- 4  -RB     Taking care of personal grooming (such as brushing teeth) -- 4  -RB     Eating meals -- 4  -RB     AM-PAC 6 Clicks Score (OT) -- 18  -RB        Functional Assessment    Outcome Measure Options AM-PAC 6 Clicks Basic Mobility (PT)  -TA --           User Key  (r) = Recorded By, (t) = Taken By, (c) = Cosigned By    Initials Name Provider Type    RB Jeet Morris OT Occupational Therapist    Manuela Montoya, JOHNNA Physical Therapist Assistant                Time Calculation:    Time Calculation- OT     Row Name 04/12/23 1306 04/12/23 1236          Time Calculation- OT    OT Start Time 0959  -RB --     OT Stop Time 1040  -RB --     OT Time Calculation (min) 41 min  -RB --     OT Received On 04/12/23  -RB --        Timed Charges    60143 - Gait Training Minutes  -- 15  -TA        Total Minutes    Timed Charges Total Minutes -- 15  -TA      Total Minutes -- 15  -TA           User Key  (r) = Recorded By, (t) = Taken By, (c) = Cosigned By    Initials Name Provider Type    RB Jeet Morris OT Occupational Therapist    Manuela Montoya, JOHNNA Physical Therapist Assistant              Therapy Charges for Today     Code Description Service Date Service Provider Modifiers Qty    53919498106  OT SELF CARE/MGMT/TRAIN EA 15 MIN 4/12/2023 Jeet Morris OT GO 2    79922743315 HC OT THERAPEUTIC ACT EA 15 MIN 4/12/2023 Jeet Morris OT GO 1               Jeet Morris OT  4/12/2023

## 2023-04-12 NOTE — DISCHARGE PLACEMENT REQUEST
"Antolin Varela (47 y.o. Male)     Date of Birth   1975    Social Security Number       Address   [address] [address]    Home Phone   [phone]    MRN   [id number]       Religious   Confucianist    Marital Status                               Admission Date   4/10/23    Admission Type   Elective    Admitting Provider   Quentin Ceballos DPM    Attending Provider   Quentin Ceballos DPM    Department, Room/Bed   [address], 428/1       Discharge Date       Discharge Disposition       Discharge Destination                               Attending Provider: Quentin Ceballos DPM    Allergies: Phenobarbital    Isolation: None   Infection: None   Code Status: CPR    Ht: 182.9 cm (72\")   Wt: 137 kg (303 lb)    Admission Cmt: None   Principal Problem: Charcot arthropathy of midfoot [M14.679]                 Active Insurance as of 4/10/2023     Primary Coverage     Payor Plan Insurance Group Employer/Plan Group    HUMANA MEDICARE REPLACEMENT HUMANA MEDICARE REPLACEMENT [id number]     Payor Plan Address Payor Plan Phone Number Payor Plan Fax Number Effective Dates    [address] [address] [phone]  1/1/2022 - None Entered    [facility] [address]       Subscriber Name Subscriber Birth Date Member ID       NICHOLEANTOLIN D 1975 [id number]                 Emergency Contacts      (Rel.) Home Phone Work Phone Mobile Phone    SILVESTRE VARELA (Spouse) -- -- [phone]            Emergency Contact Information     Name Relation Home Work Mobile    SILVESTRE VARELA Spouse   [phone]          Insurance Information                HUMANA MEDICARE REPLACEMENT/HUMANA MEDICARE REPLACEMENT Phone: [phone]    Subscriber: Antolin Varela Subscriber#: [id number]    Group#: [id number] Precert#: --          "

## 2023-04-12 NOTE — PLAN OF CARE
Goal Outcome Evaluation:  Plan of Care Reviewed With: patient           Outcome Evaluation: pt sitting in chair upon entry. pt sit<>stand with CGA-min assist of 1, pt ambulated 20` with RW & CGA, pt able to maintain NWB status on R LE. pt would benefit from 24/7 care & conitnued PT services

## 2023-04-12 NOTE — PLAN OF CARE
Goal Outcome Evaluation:            OT tx on this date.  Pt demo modified independence with bed mobility.  He was CGA for sit to stand, 30' ambulation and toilet transfer.  Pt was able to don/doff sock with modified independence.

## 2023-04-12 NOTE — PROGRESS NOTES
Podiatric Surgery Progress Note    Subjective     Post-Operative Day: 2 post-right foot charcot reconstruction  Systemic or Specific Complaints: No Complaints    Objective     Vital signs in last 24 hours:  Temp:  [97.7 °F (36.5 °C)-99.8 °F (37.7 °C)] 97.8 °F (36.6 °C)  Heart Rate:  [] 82  Resp:  [16-20] 18  BP: (130-139)/(56-63) 137/56    General: alert, appears stated age and cooperative   Neurovascular: SILT  Capillary refill: Normal   Wound: Dressing intact with strikethrough       DVT Exam: No evidence of DVT seen on physical exam.     Data Review  CBC:      Assessment & Plan     Charcot foot    Patient doing well postoperatively. Pain adequately controlled on current regimen.  Discussed with patient discharging him home with home health physical therapy.  At this time patient is unsure if he will be able to take care of himself at home.  Case management has been consulted for placement.  Continue to work with PT/OT.      LOS: 0 days     Quentin Ceballos DPM    Date: 4/12/2023  Time: 15:15 CDT

## 2023-04-12 NOTE — THERAPY TREATMENT NOTE
Acute Care - Physical Therapy Treatment Note  [facility]     Patient Name: Taylor Le  : 1975  MRN: [id number]  Today's Date: 2023      Visit Dx:     ICD-10-CM ICD-9-CM   1. Charcot arthropathy of midfoot  M14.679 094.0     713.5   2. Impaired mobility and ADLs  Z74.09 V49.89    Z78.9    3. Impaired functional mobility, balance, gait, and endurance  Z74.09 V49.89     Patient Active Problem List   Diagnosis   • Lymphedema   • Class II obesity   • Essential hypertension   • Diabetes mellitus type 2, with complication, on long term insulin pump   • Mixed hyperlipidemia   • Leg edema   • Leg swelling   • Charcot arthropathy of midfoot     Past Medical History:   Diagnosis Date   • Diabetes mellitus    • Hyperlipidemia    • Hypertension      Past Surgical History:   Procedure Laterality Date   • APPENDECTOMY     • CATARACT EXTRACTION Bilateral    • RETINAL DETACHMENT REPAIR Right    • RETINAL DETACHMENT SURGERY Left 2009     PT Assessment (last 12 hours)     PT Evaluation and Treatment     Row Name 23 1124          Physical Therapy Time and Intention    Subjective Information complains of;pain  -TA     Document Type therapy note (daily note)  -TA     Mode of Treatment individual therapy;physical therapy  -TA     Row Name 23 1124          General Information    Patient Profile Reviewed yes  -TA     Existing Precautions/Restrictions fall;non-weight bearing  -TA     Row Name 23 1124          Pain    Pretreatment Pain Rating 3/10  -TA     Posttreatment Pain Rating 4/10  -TA     Pain Location - Side/Orientation Right  -TA     Pain Location - foot  -TA     Row Name 23 1124          Cognition    Orientation Status (Cognition) oriented x 4  -TA     Personal Safety Interventions fall prevention program maintained;gait belt;muscle strengthening facilitated;supervised activity;nonskid shoes/slippers when out of bed  -TA     Row Name 23 1124          Mobility    Extremity  Weight-bearing Status right lower extremity  -TA     Right Lower Extremity (Weight-bearing Status) non weight-bearing (NWB)   -TA     Row Name 04/12/23 1124          Bed Mobility    Bed Mobility supine-sit;sit-supine  -TA     Supine-Sit [place] (Bed Mobility) not tested  -TA     Sit-Supine [place] (Bed Mobility) modified independence  -TA     Assistive Device (Bed Mobility) head of bed elevated  -TA     Row Name 04/12/23 1124          Transfers    Comment, (Transfers) pt performed sit<>stand x 5  -TA     Row Name 04/12/23 1124          Sit-Stand Transfer    Sit-Stand [place] (Transfers) minimum assist (75% patient effort)  -TA     Assistive Device (Sit-Stand Transfers) walker, front-wheeled  -TA     Row Name 04/12/23 1124          Stand-Sit Transfer    Stand-Sit [place] (Transfers) contact guard;minimum assist (75% patient effort);1 person assist  -TA     Assistive Device (Stand-Sit Transfers) walker, front-wheeled  -TA     Row Name 04/12/23 1124          Gait/Stairs (Locomotion)    [place] Level (Gait) contact guard  -TA     Assistive Device (Gait) walker, front-wheeled  -TA     Distance in Feet (Gait) 20`  -TA     Row Name 04/12/23 1124          Safety Issues, Functional Mobility    Impairments Affecting Function (Mobility) endurance/activity tolerance;strength;visual/perceptual;balance  -TA     Row Name 04/12/23 1124          Ankle (Therapeutic Exercise)    Ankle (Therapeutic Exercise) AROM (active range of motion)  -TA     Ankle AROM (Therapeutic Exercise) dorsiflexion;plantarflexion;sitting;10 repetitions  -TA     Row Name             Wound 04/10/23 1200 Right foot Incision    Wound - Properties Group Placement Date: 04/10/23  -EW Placement Time: 1200  -EW Side: Right  -EW Location: foot  -EW Primary Wound Type: Incision  -EW Additional Comments: incision x4  -HC    Retired Wound - Properties Group Placement Date: 04/10/23  -EW Placement Time: 1200  -EW Side: Right  -EW Location: foot   -EW Primary Wound Type: Incision  -EW Additional Comments: incision x4  -HC    Retired Wound - Properties Group Date first assessed: 04/10/23  -EW Time first assessed: 1200  -EW Side: Right  -EW Location: foot  -EW Primary Wound Type: Incision  -EW Additional Comments: incision x4  -HC    Row Name 04/12/23 1124          Plan of Care Review    Plan of Care Reviewed With patient  -TA     Outcome Evaluation pt sitting in chair upon entry. pt sit<>stand with CGA-min assist of 1, pt ambulated 20` with RW & CGA, pt able to maintain NWB status on R LE. pt would benefit from 24/7 care & conitnued PT services  -TA     Row Name 04/12/23 1124          Vital Signs    Pre Systolic BP Rehab 117  -TA     Pre Treatment Diastolic BP 55  -TA     Post Systolic BP Rehab 118  -TA     Post Treatment Diastolic BP 50  -TA     Pretreatment Heart Rate (beats/min) 79  -TA     Posttreatment Heart Rate (beats/min) 85  -TA     Pre SpO2 (%) 97  -TA     O2 Delivery Pre Treatment room air  -TA     Post SpO2 (%) 98  -TA     O2 Delivery Post Treatment room air  -TA     Pre Patient Position Sitting  -TA     Post Patient Position Supine  -TA     Row Name 04/12/23 1124          Positioning and Restraints    Pre-Treatment Position in bed  -TA     Post Treatment Position bed  -TA     In Bed fowlers;call light within reach;exit alarm on  -TA     Row Name 04/12/23 1124          Therapy Assessment/Plan (PT)    Therapy Frequency (PT) daily  x2  -TA     Comment, Therapy Assessment/Plan (PT) continue  -TA     Row Name 04/12/23 1124          Bed Mobility Goal 1 (PT)    Activity/Assistive Device (Bed Mobility Goal 1, PT) sit to supine;supine to sit  -TA     [place] Level/Cues Needed (Bed Mobility Goal 1, PT) independent  -TA     Time Frame (Bed Mobility Goal 1, PT) by discharge  -TA     Progress/Outcomes (Bed Mobility Goal 1, PT) goal not met;continuing progress toward goal  -TA     Row Name 04/12/23 1124          Transfer Goal 1 (PT)    Activity/Assistive  Device (Transfer Goal 1, PT) sit-to-stand/stand-to-sit;bed-to-chair/chair-to-bed;walker, rolling  -TA     [place] Level/Cues Needed (Transfer Goal 1, PT) standby assist;modified independence  -TA     Time Frame (Transfer Goal 1, PT) by discharge  -TA     Progress/Outcome (Transfer Goal 1, PT) goal not met  -TA     Row Name 04/12/23 1124          Gait Training Goal 1 (PT)    Activity/Assistive Device (Gait Training Goal 1, PT) gait (walking locomotion);assistive device use;decrease fall risk;increase endurance/gait distance  -TA     [place] Level (Gait Training Goal 1, PT) modified independence  -TA     Distance (Gait Training Goal 1, PT) 50ft each trip  -TA     Time Frame (Gait Training Goal 1, PT) 3 days  -TA     Progress/Outcome (Gait Training Goal 1, PT) goal not met;good progress toward goal  -TA     Row Name 04/12/23 1124          Stairs Goal 1 (PT)    Activity/Assistive Device (Stairs Goal 1, PT) ascending stairs;descending stairs;assistive device use  -TA     [place] Level/Cues Needed (Stairs Goal 1, PT) minimum assist (75% or more patient effort);contact guard required;standby assist  -TA     Number of Stairs (Stairs Goal 1, PT) 2  -TA     Time Frame (Stairs Goal 1, PT) 3 days  -TA     Progress/Outcome (Stairs Goal 1, PT) goal not met  -TA           User Key  (r) = Recorded By, (t) = Taken By, (c) = Cosigned By    Initials Name Provider Type    TA Manuela Chowdhury PTA Physical Therapist Assistant    Lou Lieberman, RN Registered Nurse    Magaly Britton RN Registered Nurse                Physical Therapy Education     Title: PT OT SLP Therapies (In Progress)     Topic: Physical Therapy (In Progress)     Point: Mobility training (In Progress)     Learning Progress Summary           Patient Acceptance, E, NR by CECE at 4/11/2023 6307                   Point: Home exercise program (Not Started)     Learner Progress:  Not documented in this visit.          Point: Body mechanics (In Progress)      Learning Progress Summary           Patient Acceptance, E, NR by CECE at 4/11/2023 1315                   Point: Precautions (In Progress)     Learning Progress Summary           Patient Acceptance, E, NR by CECE at 4/11/2023 1315                               User Key     Initials Effective Dates Name Provider Type Discipline    CECE 06/16/21 -  Lisa Ho PT Physical Therapist PT              PT Recommendation and Plan  Anticipated Discharge Disposition (PT): home with assist, home with home health  Therapy Frequency (PT): daily (x2)  Plan of Care Reviewed With: patient  Outcome Evaluation: pt sitting in chair upon entry. pt sit<>stand with CGA-min assist of 1, pt ambulated 20` with RW & CGA, pt able to maintain NWB status on R LE. pt would benefit from 24/7 care & conitnued PT services   Outcome Measures     Row Name 04/12/23 1200             How much help from another person do you currently need...    Turning from your back to your side while in flat bed without using bedrails? 4  -TA      Moving from lying on back to sitting on the side of a flat bed without bedrails? 3  -TA      Moving to and from a bed to a chair (including a wheelchair)? 3  -TA      Standing up from a chair using your arms (e.g., wheelchair, bedside chair)? 3  -TA      Climbing 3-5 steps with a railing? 2  -TA      To walk in hospital room? 3  -TA      AM-PAC 6 Clicks Score (PT) 18  -TA         Functional Assessment    Outcome Measure Options AM-PAC 6 Clicks Basic Mobility (PT)  -TA            User Key  (r) = Recorded By, (t) = Taken By, (c) = Cosigned By    Initials Name Provider Type    Manuela Montoya, PTA Physical Therapist Assistant                 Time Calculation:    PT Charges     Row Name 04/12/23 1236             Time Calculation    Start Time 1124  -TA      Stop Time 1202  -TA      Time Calculation (min) 38 min  -TA      PT Received On 04/12/23  -TA         Time Calculation- PT    Total Timed Code Minutes- PT 38 minute(s)   -TA         Timed Charges    79551 - Gait Training Minutes  15  -TA      72513 - PT Therapeutic Activity Minutes 23  -TA         Total Minutes    Timed Charges Total Minutes 38  -TA       Total Minutes 38  -TA            User Key  (r) = Recorded By, (t) = Taken By, (c) = Cosigned By    Initials Name Provider Type    Manuela Montoya PTA Physical Therapist Assistant              Therapy Charges for Today     Code Description Service Date Service Provider Modifiers Qty    97986596007 HC GAIT TRAINING EA 15 MIN 4/12/2023 Manuela Chowdhury PTA GP 1    87072193275 HC PT THERAPEUTIC ACT EA 15 MIN 4/12/2023 Manuela Chowdhury PTA GP 2          PT G-Codes  Outcome Measure Options: AM-PAC 6 Clicks Basic Mobility (PT)  AM-PAC 6 Clicks Score (PT): 18  AM-PAC 6 Clicks Score (OT): 18    Manuela Chowdhury PTA  4/12/2023

## 2023-04-12 NOTE — PLAN OF CARE
Goal Outcome Evaluation:  Plan of Care Reviewed With: patient        Progress: improving  Outcome Evaluation: Pt is alert and oriented. Slept well throughout the night. Continues to have some numbness in LUE but improving. RLE dressing is dry and intact. Has some dried bloody drainage on outside of dressing but no change in amount. Toes are pink and warm. Has 3+ edema in LLE.  Has a cont. insuling pump that he is montoring. Pt will update RN of BS and coverage and it is added to the bedside flowsheet. Will continue to monitor patient.

## 2023-04-13 ENCOUNTER — HOME HEALTH ADMISSION (OUTPATIENT)
Dept: HOME HEALTH SERVICES | Facility: HOME HEALTHCARE | Age: 48
End: 2023-04-13
Payer: MEDICARE

## 2023-04-13 VITALS
OXYGEN SATURATION: 94 % | RESPIRATION RATE: 18 BRPM | SYSTOLIC BLOOD PRESSURE: 133 MMHG | WEIGHT: 315 LBS | DIASTOLIC BLOOD PRESSURE: 68 MMHG | HEIGHT: 72 IN | BODY MASS INDEX: 42.66 KG/M2 | TEMPERATURE: 98.5 F | HEART RATE: 88 BPM

## 2023-04-13 LAB
ANION GAP SERPL CALCULATED.3IONS-SCNC: 5 MMOL/L (ref 5–15)
BASOPHILS # BLD AUTO: NORMAL 10*3/UL
BASOPHILS NFR BLD AUTO: NORMAL %
BUN SERPL-MCNC: 14 MG/DL (ref 6–20)
BUN/CREAT SERPL: 14.4 (ref 7–25)
CALCIUM SPEC-SCNC: 8.5 MG/DL (ref 8.6–10.5)
CHLORIDE SERPL-SCNC: 100 MMOL/L (ref 98–107)
CO2 SERPL-SCNC: 28 MMOL/L (ref 22–29)
CREAT SERPL-MCNC: 0.97 MG/DL (ref 0.76–1.27)
EGFRCR SERPLBLD CKD-EPI 2021: 98.1 ML/MIN/1.73
EOSINOPHIL # BLD AUTO: NORMAL 10*3/UL
EOSINOPHIL NFR BLD AUTO: NORMAL %
ERYTHROCYTE [DISTWIDTH] IN BLOOD BY AUTOMATED COUNT: NORMAL %
GLUCOSE SERPL-MCNC: 100 MG/DL (ref 65–99)
HCT VFR BLD AUTO: NORMAL %
HGB BLD-MCNC: NORMAL G/DL
LYMPHOCYTES # BLD AUTO: NORMAL 10*3/UL
LYMPHOCYTES NFR BLD AUTO: NORMAL %
MCH RBC QN AUTO: NORMAL PG
MCHC RBC AUTO-ENTMCNC: NORMAL G/DL
MCV RBC AUTO: NORMAL FL
MONOCYTES # BLD AUTO: NORMAL 10*3/UL
MONOCYTES NFR BLD AUTO: NORMAL %
NEUTROPHILS NFR BLD AUTO: NORMAL %
NEUTROPHILS NFR BLD AUTO: NORMAL %
PLATELET # BLD AUTO: NORMAL 10*3/UL
POTASSIUM SERPL-SCNC: 4.7 MMOL/L (ref 3.5–5.2)
RBC # BLD AUTO: NORMAL 10*6/UL
SODIUM SERPL-SCNC: 133 MMOL/L (ref 136–145)
WBC NRBC COR # BLD: NORMAL 10*3/UL

## 2023-04-13 PROCEDURE — 25010000002 HEPARIN (PORCINE) PER 1000 UNITS: Performed by: PODIATRIST

## 2023-04-13 PROCEDURE — 63710000001 OXYCODONE-ACETAMINOPHEN 7.5-325 MG TABLET: Performed by: PODIATRIST

## 2023-04-13 PROCEDURE — 85025 COMPLETE CBC W/AUTO DIFF WBC: CPT | Performed by: PODIATRIST

## 2023-04-13 PROCEDURE — A9270 NON-COVERED ITEM OR SERVICE: HCPCS | Performed by: PODIATRIST

## 2023-04-13 PROCEDURE — 80048 BASIC METABOLIC PNL TOTAL CA: CPT | Performed by: PODIATRIST

## 2023-04-13 PROCEDURE — 97110 THERAPEUTIC EXERCISES: CPT

## 2023-04-13 PROCEDURE — 63710000001 SENNOSIDES-DOCUSATE 8.6-50 MG TABLET: Performed by: PODIATRIST

## 2023-04-13 PROCEDURE — 97535 SELF CARE MNGMENT TRAINING: CPT

## 2023-04-13 RX ADMIN — DOCUSATE SODIUM 50 MG AND SENNOSIDES 8.6 MG 2 TABLET: 8.6; 5 TABLET, FILM COATED ORAL at 09:58

## 2023-04-13 RX ADMIN — HEPARIN SODIUM 5000 UNITS: 5000 INJECTION INTRAVENOUS; SUBCUTANEOUS at 09:58

## 2023-04-13 RX ADMIN — OXYCODONE HYDROCHLORIDE AND ACETAMINOPHEN 1 TABLET: 7.5; 325 TABLET ORAL at 13:43

## 2023-04-13 NOTE — DISCHARGE PLACEMENT REQUEST
"Antolin Varela (47 y.o. Male)     Date of Birth   1975    Social Security Number       Address   [address] [place] [id number]    Home Phone   [phone]    MRN   [id number]       Jainism   Uatsdin    Marital Status                               Admission Date   4/10/23    Admission Type   Elective    Admitting Provider   Quentin Ceballos DPM    Attending Provider   Quentin Ceballos DPM    Department, Room/Bed   [address], [facility]/1       Discharge Date       Discharge Disposition   Home or Self Care    Discharge Destination                               Attending Provider: Quentin Ceballos DPM    Allergies: Phenobarbital    Isolation: None   Infection: None   Code Status: CPR    Ht: 182.9 cm (72\")   Wt: 146 kg (322 lb 1.6 oz)    Admission Cmt: None   Principal Problem: Charcot arthropathy of midfoot [M14.679]                 Active Insurance as of 4/10/2023     Primary Coverage     Payor Plan Insurance Group Employer/Plan Group    HUMANA MEDICARE REPLACEMENT HUMANA MEDICARE REPLACEMENT [id number]     Payor Plan Address Payor Plan Phone Number Payor Plan Fax Number Effective Dates    [address] [address] [phone]  2022 - None Entered    [facility] [address]       Subscriber Name Subscriber Birth Date Member ID       ANTOLIN VARELA 1975 [id number]                 Emergency Contacts      (Rel.) Home Phone Work Phone Mobile Phone    SILVESTRE VARELA (Spouse) -- -- [phone]           [address] [address]  Phone:  [phone]  Fax:   Date: 2023      Ambulatory Referral to Home Health (Hospital)     Patient:  Antolin Varela MRN:  [id number]   [address] [facility] [address] :  1975  SSN:    Phone: [phone] Sex:  M      INSURANCE PAYOR PLAN GROUP # SUBSCRIBER ID   Primary:    HUMANA MEDICARE REPLACEMENT [id number] " [id number]      Referring Provider Information:  АЛЕКСАНДР GRIFFIN Phone: [phone] Fax: [phone]       Referral Information:   # Visits:  999 Referral Type: Home Health [42]   Urgency:  Routine Referral Reason: Specialty Services Required   Start Date: Apr 13, 2023 End Date:  To be determined by Insurer   Diagnosis: Charcot arthropathy of midfoot (M14.679 [ICD-10-CM] 094.0,713.5 [ICD-9-CM])      Refer to Dept:   Refer to Provider:   Refer to Provider Phone:   Refer to Facility:       Face to Face Visit Date: 4/13/2023  Follow-up provider for Plan of Care? I will be treating the patient on an ongoing basis.  Please send me the Plan of Care for signature.  Follow-up provider: АЛЕКСАНДР GRIFFIN [[id number]]  Reason/Clinical Findings: post op  Describe mobility limitations that make leaving home difficult: NWB RLE  Nursing/Therapeutic Services Requested: Physical Therapy  PT orders: Home safety assessment  PT orders: Therapeutic exercise  PT orders: Transfer training  PT orders: Strengthening  Frequency: 1 Week 1     This document serves as a request of services and does not constitute Insurance authorization or approval of services.  To determine eligibility, please contact the members Insurance carrier to verify and review coverage.     If you have medical questions regarding this request for services. Please contact [address] at [phone] during normal business hours.        Authorizing Provider:Александр Griffin DPM  Authorizing Provider's NPI: [id number]  Order Entered By: Александр Griffin DPM 4/13/2023  9:27 AM     Electronically signed by: Александр Griffin DPM 4/13/2023  9:27 AM         Emergency Contact Information     Name Relation Home Work Mobile    SILVESTRE VARELA Spouse   [phone]          Insurance Information                HUMANA MEDICARE REPLACEMENT/HUMANA MEDICARE REPLACEMENT Phone: [phone]    Subscriber: Taylor Varela Subscriber#: [id number]    Group#: [id number]  Precert#: --

## 2023-04-13 NOTE — DISCHARGE PLACEMENT REQUEST
"Antolin Varela (47 y.o. Male)     Date of Birth   1975    Social Security Number       Address   [address] [place] [address]    Home Phone   [phone]    MRN   [id number]       Adventism   Samaritan    Marital Status                               Admission Date   4/10/23    Admission Type   Elective    Admitting Provider   Quentin Ceballos DPM    Attending Provider   Quentin Ceballos DPM    Department, Room/Bed   [address], [facility]/1       Discharge Date       Discharge Disposition   Home or Self Care    Discharge Destination                               Attending Provider: Quentin Ceballos DPM    Allergies: Phenobarbital    Isolation: None   Infection: None   Code Status: CPR    Ht: 182.9 cm (72\")   Wt: 146 kg (322 lb 1.6 oz)    Admission Cmt: None   Principal Problem: Charcot arthropathy of midfoot [M14.679]                 Active Insurance as of 4/10/2023     Primary Coverage     Payor Plan Insurance Group Employer/Plan Group    HUMANA MEDICARE REPLACEMENT HUMANA MEDICARE REPLACEMENT [id number]     Payor Plan Address Payor Plan Phone Number Payor Plan Fax Number Effective Dates    [address] [address] [phone]  2022 - None Entered    [facility] [address]       Subscriber Name Subscriber Birth Date Member ID       ANTOLIN VARELA 1975 [id number]                 Emergency Contacts      (Rel.) Home Phone Work Phone Mobile Phone    SILVESTRE VARELA (Spouse) -- -- [phone]             [address] [address]  Dept. Phone:  [phone]  Dept. Fax:   Date Ordered: 2023         Patient:  Antolin Varela MRN:  [id number]   [address] [facility] [address] :  1975  SSN:    Phone: [phone] Sex:  M     Weight: 146 kg (322 lb 1.6 oz)         Ht Readings from Last 1 Encounters:   04/10/23 182.9 cm (72\")         Quentin" (Order ID: [id number])    Diagnosis:  Charcot arthropathy of midfoot (M14.679 [ICD-10-CM] 094.0,713.5 [ICD-9-CM])   Quantity:  1  Comments: front wheeled walker with 5 inch fixed wheels     Equipment:  Walker Folding with Wheels  Length of Need (99 Months = Lifetime): 6 Months        Authorizing Provider's Phone: [phone]  Authorizing Provider:Quentin Ceballos DPM  Authorizing Provider's NPI: [id number]  Order Entered By: Quentin Ceballos DPM 4/13/2023  9:27 AM     Electronically signed by: Quentin Ceballos DPM 4/13/2023  9:27 AM       Emergency Contact Information     Name Relation Home Work Mobile    SILVESTRE VAERLA Spouse   [phone]          Insurance Information                HUMANA MEDICARE REPLACEMENT/HUMANA MEDICARE REPLACEMENT Phone: [phone]    Subscriber: Taylor Varela Subscriber#: [id number]    Group#: [id number] Precert#: --

## 2023-04-13 NOTE — DISCHARGE PLACEMENT REQUEST
"Antolin Varela (47 y.o. Male)     Date of Birth   1975    Social Security Number       Address   [address] [place] [address]    Home Phone   [phone]    MRN   [id number]       Oriental orthodox   Religious    Marital Status                               Admission Date   4/10/23    Admission Type   Elective    Admitting Provider   Quentin Ceballos DPM    Attending Provider   Quentin Ceballos DPM    Department, Room/Bed   [address], [facility]/1       Discharge Date       Discharge Disposition   Home or Self Care    Discharge Destination                               Attending Provider: Quentin Ceballos DPM    Allergies: Phenobarbital    Isolation: None   Infection: None   Code Status: CPR    Ht: 182.9 cm (72\")   Wt: 146 kg (322 lb 1.6 oz)    Admission Cmt: None   Principal Problem: Charcot arthropathy of midfoot [M14.679]                 Active Insurance as of 4/10/2023     Primary Coverage     Payor Plan Insurance Group Employer/Plan Group    HUMANA MEDICARE REPLACEMENT HUMANA MEDICARE REPLACEMENT [id number]     Payor Plan Address Payor Plan Phone Number Payor Plan Fax Number Effective Dates    [address] [address] [phone]  2022 - None Entered    [facility] [address]       Subscriber Name Subscriber Birth Date Member ID       ANTOLIN VARELA 1975 [id number]                 Emergency Contacts      (Rel.) Home Phone Work Phone Mobile Phone    SILVESTRE VARELA (Spouse) -- -- [phone]           [address] [address]  Dept. Phone:  [phone]  Dept. Fax:   Date Ordered: 2023         Patient:  Antolin Varela MRN:  [id number]   [address] [facility] [address] :  1975  SSN:    Phone: [phone] Sex:  M     Weight: 146 kg (322 lb 1.6 oz)         Ht Readings from Last 1 Encounters:   04/10/23 182.9 cm (72\")         Commode Chair          " (Order ID: [id number])    Diagnosis:  Charcot arthropathy of midfoot (M14.679 [ICD-10-CM] 094.0,713.5 [ICD-9-CM])   Quantity:  1  Comments: 3 in 1 bedside commode     Equipment:  Bedside Commode Chair w/Fixed Arms  Length of Need (99 Months = Lifetime): 6 Months        Authorizing Provider's Phone: [phone]  Authorizing Provider:Quentin Ceballos DPM  Authorizing Provider's NPI: [id number]  Order Entered By: Quentin Ceballos DPM 4/13/2023  9:27 AM     Electronically signed by: Quentin Ceballos DPM 4/13/2023  9:27 AM         Emergency Contact Information     Name Relation Home Work Mobile    SILVESTRE VARELA Spouse   [phone]          Insurance Information                HUMANA MEDICARE REPLACEMENT/HUMANA MEDICARE REPLACEMENT Phone: [phone]    Subscriber: Taylor Varela Subscriber#: [id number]    Group#: [id number] Precert#: --

## 2023-04-13 NOTE — THERAPY TREATMENT NOTE
Patient Name: Taylor Le  : 1975    MRN: [id number]                              Today's Date: 2023       Admit Date: 4/10/2023    Visit Dx:     ICD-10-CM ICD-9-CM   1. Charcot arthropathy of midfoot  M14.679 094.0     713.5   2. Impaired mobility and ADLs  Z74.09 V49.89    Z78.9    3. Impaired functional mobility, balance, gait, and endurance  Z74.09 V49.89     Patient Active Problem List   Diagnosis   • Lymphedema   • Class II obesity   • Essential hypertension   • Diabetes mellitus type 2, with complication, on long term insulin pump   • Mixed hyperlipidemia   • Leg edema   • Leg swelling   • Charcot arthropathy of midfoot     Past Medical History:   Diagnosis Date   • Diabetes mellitus    • Hyperlipidemia    • Hypertension      Past Surgical History:   Procedure Laterality Date   • APPENDECTOMY     • CATARACT EXTRACTION Bilateral    • RETINAL DETACHMENT REPAIR Right    • RETINAL DETACHMENT SURGERY Left       General Information     Row Name 23 1055          OT Time and Intention    Document Type therapy note (daily note)  -     Mode of Treatment individual therapy;speech-language pathology  -     Row Name 23 1055          General Information    Patient Profile Reviewed yes  -     Existing Precautions/Restrictions fall;non-weight bearing  -     Row Name 23 1055          Cognition    Orientation Status (Cognition) oriented x 4  -     Row Name 23 1056          Safety Issues, Functional Mobility    Impairments Affecting Function (Mobility) endurance/activity tolerance;strength;visual/perceptual;balance  -           User Key  (r) = Recorded By, (t) = Taken By, (c) = Cosigned By    Initials Name Provider Type     Doreen Degroot COTA Occupational Therapist Assistant                 Mobility/ADL's    No documentation.                Obj/Interventions    No documentation.                Goals/Plan     Row Name 23 1055          Transfer Goal 1 (OT)     Activity/Assistive Device (Transfer Goal 1, OT) transfers, all;walker, rolling  -RC     Rawlings Level/Cues Needed (Transfer Goal 1, OT) modified independence  -RC     Time Frame (Transfer Goal 1, OT) long term goal (LTG);by discharge  -RC     Progress/Outcome (Transfer Goal 1, OT) goal not met  -RC     Row Name 04/13/23 1055          Dressing Goal 1 (OT)    Activity/Device (Dressing Goal 1, OT) lower body dressing  -RC     Rawlings/Cues Needed (Dressing Goal 1, OT) independent  -RC     Time Frame (Dressing Goal 1, OT) long term goal (LTG);by discharge  -RC     Strategies/Barriers (Dressing Goal 1, OT) AD as needed  -RC     Progress/Outcome (Dressing Goal 1, OT) goal not met  -RC     Row Name 04/13/23 1055          Toileting Goal 1 (OT)    Activity/Device (Toileting Goal 1, OT) toileting skills, all  -RC     Rawlings Level/Cues Needed (Toileting Goal 1, OT) independent  -RC     Time Frame (Toileting Goal 1, OT) long term goal (LTG);by discharge  -RC     Progress/Outcome (Toileting Goal 1, OT) goal not met  -RC     Row Name 04/13/23 1055          Strength Goal 1 (OT)    Strength Goal 1 (OT) Pt will increase fine motor coordination and  strength in the L hand to improve independence in ADLs.  -RC     Time Frame (Strength Goal 1, OT) long term goal (LTG);by discharge  -RC     Progress/Outcome (Strength Goal 1, OT) goal not met  -RC           User Key  (r) = Recorded By, (t) = Taken By, (c) = Cosigned By    Initials Name Provider Type    RC Doreen Degroot COTA Occupational Therapist Assistant               Clinical Impression     Row Name 04/13/23 1052          Pain Assessment    Pretreatment Pain Rating 0/10 - no pain  -RC     Posttreatment Pain Rating 0/10 - no pain  -     Row Name 04/13/23 1059          Therapy Assessment/Plan (OT)    Rehab Potential (OT) good, to achieve stated therapy goals  -     Criteria for Skilled Therapeutic Interventions Met (OT) yes;skilled treatment is necessary;meets  criteria  -     Therapy Frequency (OT) daily  5-7 d/wk  -     Row Name 04/13/23 1055          Therapy Plan Review/Discharge Plan (OT)    Anticipated Discharge Disposition (OT) home with assist;home with home health;home with outpatient therapy services  -           User Key  (r) = Recorded By, (t) = Taken By, (c) = Cosigned By    Initials Name Provider Type    Doreen Kong COTA Occupational Therapist Assistant               Outcome Measures     Row Name 04/13/23 1055          How much help from another is currently needed...    Putting on and taking off regular lower body clothing? 2  -RC     Bathing (including washing, rinsing, and drying) 2  -RC     Toileting (which includes using toilet bed pan or urinal) 2  -RC     Putting on and taking off regular upper body clothing 4  -RC     Taking care of personal grooming (such as brushing teeth) 4  -RC     Eating meals 4  -     AM-PAC 6 Clicks Score (OT) 18  -     Row Name 04/13/23 1100          How much help from another person do you currently need...    Turning from your back to your side while in flat bed without using bedrails? 4  -DAVID     Moving from lying on back to sitting on the side of a flat bed without bedrails? 3  -DAVID     Moving to and from a bed to a chair (including a wheelchair)? 3  -DAVID     Standing up from a chair using your arms (e.g., wheelchair, bedside chair)? 3  -DAVID     Climbing 3-5 steps with a railing? 2  -DAVID     To walk in hospital room? 3  -DAVID     AM-PAC 6 Clicks Score (PT) 18  -DAVID     Highest level of mobility 6 --> Walked 10 steps or more  -     Row Name 04/13/23 1055          Functional Assessment    Outcome Measure Options AM-PAC 6 Clicks Daily Activity (OT)  -           User Key  (r) = Recorded By, (t) = Taken By, (c) = Cosigned By    Initials Name Provider Type    Belkys Mariscal RN Registered Nurse     Doreen Degroot COTA Occupational Therapist Assistant                Occupational Therapy Education      Title: PT OT SLP Therapies (Done)     Topic: Occupational Therapy (Done)     Point: ADL training (Resolved)     Description:   Instruct learner(s) on proper safety adaptation and remediation techniques during self care or transfers.   Instruct in proper use of assistive devices.              Learning Progress Summary           Patient Acceptance, E,TB, VU by  at 4/11/2023 1300    Comment: OT role, POC, safety precautions                   Point: Home exercise program (Done)     Description:   Instruct learner(s) on appropriate technique for monitoring, assisting and/or progressing therapeutic exercises/activities.              Learning Progress Summary           Patient Acceptance, E,D, VU by  at 4/13/2023 1137                   Point: Precautions (Done)     Description:   Instruct learner(s) on prescribed precautions during self-care and functional transfers.              Learning Progress Summary           Patient Acceptance, E,D, VU by  at 4/13/2023 1137    Acceptance, E, DU by  at 4/12/2023 1301    Comment: Edu pt on use of gait belt and non skid socks when OOB and no OOB without assist.    Acceptance, E,TB, VU by  at 4/11/2023 1300    Comment: OT role, POC, safety precautions                   Point: Body mechanics (Done)     Description:   Instruct learner(s) on proper positioning and spine alignment during self-care, functional mobility activities and/or exercises.              Learning Progress Summary           Patient Acceptance, E,D, VU by  at 4/13/2023 1137    Acceptance, E,TB, VU by  at 4/11/2023 1300    Comment: OT role, POC, safety precautions                               User Key     Initials Effective Dates Name Provider Type Discipline     06/16/21 -  Jeet Morris, OT Occupational Therapist OT     06/16/21 -  Doreen Degroot COTA Occupational Therapist Assistant OT     10/19/22 -  Caity Antony OT Occupational Therapist OT              OT Recommendation and  Plan  Therapy Frequency (OT): daily (5-7 d/wk)  Plan of Care Review  Plan of Care Reviewed With: patient  Outcome Evaluation: nursing ok'd OT pt agreeable issued foam exerciser for l hand reviewed ROM ex for finger extension arom wrist, and pro/supination forearm, pt reports feeling in l hand is improving. discussed needs for bsc and adjusted fww to height of fww pt has used in room. pt will be sponge bathing until able to go upstairs to shower. Reinforced NWB on R LE  pt is expected to d/c today recommend HHOT to follow     Time Calculation:    Time Calculation- OT     Row Name 04/13/23 1129             Time Calculation- OT    OT Start Time 1055  -RC      OT Stop Time 1120  -RC      OT Time Calculation (min) 25 min  -RC      Total Timed Code Minutes- OT 25 minute(s)  -RC      OT Received On 04/13/23  -RC         Timed Charges    24550 - OT Therapeutic Exercise Minutes 15  -RC      79172 - OT Self Care/Mgmt Minutes 10  -RC         Total Minutes    Timed Charges Total Minutes 25  -RC       Total Minutes 25  -RC            User Key  (r) = Recorded By, (t) = Taken By, (c) = Cosigned By    Initials Name Provider Type    RC Doreen Degroot COTA Occupational Therapist Assistant              Therapy Charges for Today     Code Description Service Date Service Provider Modifiers Qty    07889982253 HC OT THER PROC EA 15 MIN 4/13/2023 Doreen Degroot COTA GO 1    95044970725 HC OT SELF CARE/MGMT/TRAIN EA 15 MIN 4/13/2023 Doreen Degroot COTA GO 1               KESHAV Gunderson  4/13/2023

## 2023-04-13 NOTE — PLAN OF CARE
Goal Outcome Evaluation:  Plan of Care Reviewed With: patient        Progress: no change  Outcome Evaluation: VSS. Pt requested PRN pain med once this shift. Pt rested well between care. No new complaints.

## 2023-04-13 NOTE — DISCHARGE SUMMARY
Date of Discharge:  4/13/2023    Discharge Diagnosis: Charcot neuroarthropathy    Presenting Problem/History of Present Illness  Active Hospital Problems    Diagnosis  POA   • **Charcot arthropathy of midfoot [M14.679]  Unknown      Resolved Hospital Problems   No resolved problems to display.          Hospital Course  Patient is a 47 y.o. male presented for surgical intervention to the right foot.  Surgery was performed on April 10.  Patient was admitted postoperatively for pain control and evaluation with PT/OT.  Patient had an uneventful stay and was discharged home on April 13 in stable condition.      Procedures Performed    Procedure(s):  CHARCOT FOOT RECONSTRUCTION AND ALL OTHER INDICATED PROCEDURES  -------------------       Consults:   Consults     No orders found from 3/12/2023 to 4/11/2023.          Pertinent Test Results: none    Condition on Discharge:  stable    Vital Signs  Temp:  [97.7 °F (36.5 °C)-98.7 °F (37.1 °C)] 98 °F (36.7 °C)  Heart Rate:  [70-83] 70  Resp:  [16-18] 16  BP: (129-139)/(56-62) 129/62    Physical Exam:   No exam performed today,    Discharge Disposition  Home or Self Care    Discharge Medications     Discharge Medications      New Medications      Instructions Start Date   aspirin 325 MG tablet  Commonly known as: Anegl Aspirin  Replaces: aspirin 81 MG chewable tablet   325 mg, Oral, Every 12 Hours      gabapentin 300 MG capsule  Commonly known as: Neurontin   300 mg, Oral, 3 Times Daily      oxyCODONE-acetaminophen  MG per tablet  Commonly known as: Percocet   1 tablet, Oral, Every 4 Hours PRN         Continue These Medications      Instructions Start Date   ascorbic acid 500 MG tablet  Commonly known as: VITAMIN C   500 mg, Oral, Daily      Dexcom G6  device   1 each, Does not apply, Once As Needed      FreeStyle Sander 14 Day [place] device   1 Device, Does not apply, As Needed      Dexcom G6 Sensor   Does not apply, Every 10 Days      FreeStyle Sander 14 Day  Sensor misc   1 each, Does not apply, Every 14 Days      Dexcom G6 Transmitter misc   1 each, Does not apply, Every 3 Months      furosemide 40 MG tablet  Commonly known as: Lasix   40 mg, Oral, Daily      Gvoke HypoPen 2-Pack 1 MG/0.2ML solution auto-injector  Generic drug: Glucagon   1 mg, Subcutaneous, As Needed      Insulin Aspart 100 UNIT/ML injection  Commonly known as: NovoLOG   Up to 150 units daily through pumpE10.65      Insulin Glargine 100 UNIT/ML injection pen  Commonly known as: LANTUS SOLOSTAR   30 Units, Subcutaneous, 2 Times Daily      lisinopril-hydrochlorothiazide 20-25 MG per tablet  Commonly known as: PRINZIDE,ZESTORETIC   1 tablet, Oral, Daily      multivitamin with minerals tablet tablet   1 tablet, Oral, Daily      rosuvastatin 10 MG tablet  Commonly known as: Crestor   10 mg, Oral, Nightly         Stop These Medications    aspirin 81 MG chewable tablet  Replaced by: aspirin 325 MG tablet            Discharge Diet: regular    Activity at Discharge: n wb RLE    Follow-up Appointments  Future Appointments   Date Time Provider Department [place]   4/18/2023  9:00 AM Rachel Sue APRN [facility] FM MAD3 [facility]   4/18/2023 10:20 AM Carlo Davis APRN [facility] POD [facility]   4/27/2023 10:30 AM Tiana Alfonso APRN [facility] POD MAD [facility]   6/13/2023  8:45 AM Bernabe Robledo MD [facility] END [facility]     Additional Instructions for the Follow-ups that You Need to Schedule     Ambulatory Referral to Home Health (Hospital)   As directed      Face to Face Visit Date: 4/13/2023    Follow-up provider for Plan of Care?: I will be treating the patient on an ongoing basis.  Please send me the Plan of Care for signature.    Follow-up provider: АЛЕКСАНДР GRIFFIN [[id number]]    Reason/Clinical Findings: post op    Describe mobility limitations that make leaving home difficult: NWB RLE    Nursing/Therapeutic Services Requested: Physical Therapy    PT orders: Home safety assessment Therapeutic exercise Transfer training  Strengthening    Frequency: 1 Week 1               Test Results Pending at Discharge  Pending Labs     Order Current Status    CBC & Differential In process    CBC Auto Differential In process           Quentin Ceballos DPM  04/13/23  09:29 CDT

## 2023-04-13 NOTE — DISCHARGE PLACEMENT REQUEST
"Antolin Varela (47 y.o. Male)     Date of Birth   1975    Social Security Number       Address   [address] [facility] [address]    Home Phone   [phone]    MRN   [id number]       Spiritism   Jewish    Marital Status                               Admission Date   4/10/23    Admission Type   Elective    Admitting Provider   Quentin Ceballos DPM    Attending Provider   Quentin Ceballos DPM    Department, Room/Bed   [address], 428/1       Discharge Date       Discharge Disposition   Home or Self Care    Discharge Destination                               Attending Provider: Quentin Ceballos DPM    Allergies: Phenobarbital    Isolation: None   Infection: None   Code Status: CPR    Ht: 182.9 cm (72\")   Wt: 146 kg (322 lb 1.6 oz)    Admission Cmt: None   Principal Problem: Charcot arthropathy of midfoot [M14.679]                 Active Insurance as of 4/10/2023     Primary Coverage     Payor Plan Insurance Group Employer/Plan Group    HUMANA MEDICARE REPLACEMENT HUMANA MEDICARE REPLACEMENT [id number]     Payor Plan Address Payor Plan Phone Number Payor Plan Fax Number Effective Dates    [address] [address] [phone]  2022 - None Entered    [facility] [address]       Subscriber Name Subscriber Birth Date Member ID       ANTOLIN VARELA 1975 [id number]                 Emergency Contacts      (Rel.) Home Phone Work Phone Mobile Phone    SILVESTRE VARELA (Spouse) -- -- [phone]               Physical Therapy Notes (most recent note)      Manuela Chowdhury, PTA at 23 1236  Version 1 of 1         Acute Care - Physical Therapy Treatment Note  [facility]     Patient Name: Antolin Varela  : 1975  MRN: [id number]  Today's Date: 2023      Visit Dx:     ICD-10-CM ICD-9-CM   1. Charcot arthropathy of midfoot  M14.679 094.0     713.5   2. Impaired mobility and ADLs  Z74.09 V49.89    Z78.9    3. Impaired functional mobility, " balance, gait, and endurance  Z74.09 V49.89     Patient Active Problem List   Diagnosis   • Lymphedema   • Class II obesity   • Essential hypertension   • Diabetes mellitus type 2, with complication, on long term insulin pump   • Mixed hyperlipidemia   • Leg edema   • Leg swelling   • Charcot arthropathy of midfoot     Past Medical History:   Diagnosis Date   • Diabetes mellitus    • Hyperlipidemia    • Hypertension      Past Surgical History:   Procedure Laterality Date   • APPENDECTOMY     • CATARACT EXTRACTION Bilateral    • RETINAL DETACHMENT REPAIR Right 2010   • RETINAL DETACHMENT SURGERY Left 2009     PT Assessment (last 12 hours)     PT Evaluation and Treatment     Row Name 04/12/23 1124          Physical Therapy Time and Intention    Subjective Information complains of;pain  -TA     Document Type therapy note (daily note)  -TA     Mode of Treatment individual therapy;physical therapy  -TA     Row Name 04/12/23 1124          General Information    Patient Profile Reviewed yes  -TA     Existing Precautions/Restrictions fall;non-weight bearing  -TA     Row Name 04/12/23 1124          Pain    Pretreatment Pain Rating 3/10  -TA     Posttreatment Pain Rating 4/10  -TA     Pain Location - Side/Orientation Right  -TA     Pain Location - foot  -TA     Row Name 04/12/23 1124          Cognition    Orientation Status (Cognition) oriented x 4  -TA     Personal Safety Interventions fall prevention program maintained;gait belt;muscle strengthening facilitated;supervised activity;nonskid shoes/slippers when out of bed  -TA     Row Name 04/12/23 1124          Mobility    Extremity Weight-bearing Status right lower extremity  -TA     Right Lower Extremity (Weight-bearing Status) non weight-bearing (NWB)   -TA     Row Name 04/12/23 1124          Bed Mobility    Bed Mobility supine-sit;sit-supine  -TA     Supine-Sit [place] (Bed Mobility) not tested  -TA     Sit-Supine [place] (Bed Mobility) modified independence   -TA     Assistive Device (Bed Mobility) head of bed elevated  -TA     Row Name 04/12/23 1124          Transfers    Comment, (Transfers) pt performed sit<>stand x 5  -TA     Row Name 04/12/23 1124          Sit-Stand Transfer    Sit-Stand [place] (Transfers) minimum assist (75% patient effort)  -TA     Assistive Device (Sit-Stand Transfers) walker, front-wheeled  -TA     Row Name 04/12/23 1124          Stand-Sit Transfer    Stand-Sit [place] (Transfers) contact guard;minimum assist (75% patient effort);1 person assist  -TA     Assistive Device (Stand-Sit Transfers) walker, front-wheeled  -TA     Row Name 04/12/23 1124          Gait/Stairs (Locomotion)    [place] Level (Gait) contact guard  -TA     Assistive Device (Gait) walker, front-wheeled  -TA     Distance in Feet (Gait) 20`  -TA     Row Name 04/12/23 1124          Safety Issues, Functional Mobility    Impairments Affecting Function (Mobility) endurance/activity tolerance;strength;visual/perceptual;balance  -TA     Row Name 04/12/23 1124          Ankle (Therapeutic Exercise)    Ankle (Therapeutic Exercise) AROM (active range of motion)  -TA     Ankle AROM (Therapeutic Exercise) dorsiflexion;plantarflexion;sitting;10 repetitions  -TA     Row Name             Wound 04/10/23 1200 Right foot Incision    Wound - Properties Group Placement Date: 04/10/23  -EW Placement Time: 1200  -EW Side: Right  -EW Location: foot  -EW Primary Wound Type: Incision  -EW Additional Comments: incision x4  -HC    Retired Wound - Properties Group Placement Date: 04/10/23  -EW Placement Time: 1200  -EW Side: Right  -EW Location: foot  -EW Primary Wound Type: Incision  -EW Additional Comments: incision x4  -HC    Retired Wound - Properties Group Date first assessed: 04/10/23  -EW Time first assessed: 1200  -EW Side: Right  -EW Location: foot  -EW Primary Wound Type: Incision  -EW Additional Comments: incision x4  -HC    Row Name 04/12/23 1124          Plan of Care Review     Plan of Care Reviewed With patient  -TA     Outcome Evaluation pt sitting in chair upon entry. pt sit<>stand with CGA-min assist of 1, pt ambulated 20` with RW & CGA, pt able to maintain NWB status on R LE. pt would benefit from 24/7 care & conitnued PT services  -TA     Row Name 04/12/23 1124          Vital Signs    Pre Systolic BP Rehab 117  -TA     Pre Treatment Diastolic BP 55  -TA     Post Systolic BP Rehab 118  -TA     Post Treatment Diastolic BP 50  -TA     Pretreatment Heart Rate (beats/min) 79  -TA     Posttreatment Heart Rate (beats/min) 85  -TA     Pre SpO2 (%) 97  -TA     O2 Delivery Pre Treatment room air  -TA     Post SpO2 (%) 98  -TA     O2 Delivery Post Treatment room air  -TA     Pre Patient Position Sitting  -TA     Post Patient Position Supine  -TA     Row Name 04/12/23 1124          Positioning and Restraints    Pre-Treatment Position in bed  -TA     Post Treatment Position bed  -TA     In Bed fowlers;call light within reach;exit alarm on  -TA     Row Name 04/12/23 1124          Therapy Assessment/Plan (PT)    Therapy Frequency (PT) daily  x2  -TA     Comment, Therapy Assessment/Plan (PT) continue  -TA     Row Name 04/12/23 1124          Bed Mobility Goal 1 (PT)    Activity/Assistive Device (Bed Mobility Goal 1, PT) sit to supine;supine to sit  -TA     [place] Level/Cues Needed (Bed Mobility Goal 1, PT) independent  -TA     Time Frame (Bed Mobility Goal 1, PT) by discharge  -TA     Progress/Outcomes (Bed Mobility Goal 1, PT) goal not met;continuing progress toward goal  -TA     Row Name 04/12/23 1124          Transfer Goal 1 (PT)    Activity/Assistive Device (Transfer Goal 1, PT) sit-to-stand/stand-to-sit;bed-to-chair/chair-to-bed;walker, rolling  -TA     [place] Level/Cues Needed (Transfer Goal 1, PT) standby assist;modified independence  -TA     Time Frame (Transfer Goal 1, PT) by discharge  -TA     Progress/Outcome (Transfer Goal 1, PT) goal not met  -TA     Row Name 04/12/23 1124           Gait Training Goal 1 (PT)    Activity/Assistive Device (Gait Training Goal 1, PT) gait (walking locomotion);assistive device use;decrease fall risk;increase endurance/gait distance  -TA     [place] Level (Gait Training Goal 1, PT) modified independence  -TA     Distance (Gait Training Goal 1, PT) 50ft each trip  -TA     Time Frame (Gait Training Goal 1, PT) 3 days  -TA     Progress/Outcome (Gait Training Goal 1, PT) goal not met;good progress toward goal  -TA     Row Name 04/12/23 1124          Stairs Goal 1 (PT)    Activity/Assistive Device (Stairs Goal 1, PT) ascending stairs;descending stairs;assistive device use  -TA     [place] Level/Cues Needed (Stairs Goal 1, PT) minimum assist (75% or more patient effort);contact guard required;standby assist  -TA     Number of Stairs (Stairs Goal 1, PT) 2  -TA     Time Frame (Stairs Goal 1, PT) 3 days  -TA     Progress/Outcome (Stairs Goal 1, PT) goal not met  -TA           User Key  (r) = Recorded By, (t) = Taken By, (c) = Cosigned By    Initials Name Provider Type    Manuela Montoya PTA Physical Therapist Assistant    Lou Lieberman, RN Registered Nurse    Magaly Britton, RN Registered Nurse                Physical Therapy Education     Title: PT OT SLP Therapies (In Progress)     Topic: Physical Therapy (In Progress)     Point: Mobility training (In Progress)     Learning Progress Summary           Patient Acceptance, E, NR by CECE at 4/11/2023 1315                   Point: Home exercise program (Not Started)     Learner Progress:  Not documented in this visit.          Point: Body mechanics (In Progress)     Learning Progress Summary           Patient Acceptance, E, NR by CECE at 4/11/2023 1315                   Point: Precautions (In Progress)     Learning Progress Summary           Patient Acceptance, E, NR by CECE at 4/11/2023 1315                               User Key     Initials Effective Dates Name Provider Type Adrianna ZHAO 06/16/21 -   Lisa Ho, PT Physical Therapist PT              PT Recommendation and Plan  Anticipated Discharge Disposition (PT): home with assist, home with home health  Therapy Frequency (PT): daily (x2)  Plan of Care Reviewed With: patient  Outcome Evaluation: pt sitting in chair upon entry. pt sit<>stand with CGA-min assist of 1, pt ambulated 20` with RW & CGA, pt able to maintain NWB status on R LE. pt would benefit from 24/7 care & conitnued PT services   Outcome Measures     Row Name 04/12/23 1200             How much help from another person do you currently need...    Turning from your back to your side while in flat bed without using bedrails? 4  -TA      Moving from lying on back to sitting on the side of a flat bed without bedrails? 3  -TA      Moving to and from a bed to a chair (including a wheelchair)? 3  -TA      Standing up from a chair using your arms (e.g., wheelchair, bedside chair)? 3  -TA      Climbing 3-5 steps with a railing? 2  -TA      To walk in hospital room? 3  -TA      AM-PAC 6 Clicks Score (PT) 18  -TA         Functional Assessment    Outcome Measure Options AM-PAC 6 Clicks Basic Mobility (PT)  -TA            User Key  (r) = Recorded By, (t) = Taken By, (c) = Cosigned By    Initials Name Provider Type    Manuela Montoya PTA Physical Therapist Assistant                 Time Calculation:    PT Charges     Row Name 04/12/23 1236             Time Calculation    Start Time 1124  -TA      Stop Time 1202  -TA      Time Calculation (min) 38 min  -TA      PT Received On 04/12/23  -TA         Time Calculation- PT    Total Timed Code Minutes- PT 38 minute(s)  -TA         Timed Charges    27985 - Gait Training Minutes  15  -TA      91159 - PT Therapeutic Activity Minutes 23  -TA         Total Minutes    Timed Charges Total Minutes 38  -TA       Total Minutes 38  -TA            User Key  (r) = Recorded By, (t) = Taken By, (c) = Cosigned By    Initials Name Provider Type    Manuela Montoya PTA  Physical Therapist Assistant              Therapy Charges for Today     Code Description Service Date Service Provider Modifiers Qty    34556939950 HC GAIT TRAINING EA 15 MIN 4/12/2023 Manuela Chowdhury PTA GP 1    45673249564 HC PT THERAPEUTIC ACT EA 15 MIN 4/12/2023 Manuela Chowdhury PTA GP 2          PT G-Codes  Outcome Measure Options: AM-PAC 6 Clicks Basic Mobility (PT)  AM-PAC 6 Clicks Score (PT): 18  AM-PAC 6 Clicks Score (OT): 18    Manuela Chowdhury PTA  4/12/2023      Electronically signed by Manuela Chowdhury PTA at 04/12/23 1232

## 2023-04-14 ENCOUNTER — HOME CARE VISIT (OUTPATIENT)
Dept: HOME HEALTH SERVICES | Facility: CLINIC | Age: 48
End: 2023-04-14
Payer: MEDICARE

## 2023-04-14 VITALS
DIASTOLIC BLOOD PRESSURE: 90 MMHG | HEART RATE: 78 BPM | TEMPERATURE: 97.8 F | OXYGEN SATURATION: 98 % | SYSTOLIC BLOOD PRESSURE: 155 MMHG | RESPIRATION RATE: 18 BRPM

## 2023-04-14 PROCEDURE — G0151 HHCP-SERV OF PT,EA 15 MIN: HCPCS

## 2023-04-14 NOTE — Clinical Note
"Huddle  / Case Conference Note  Medical Necessity and focus of care:Pt presents a 47 y.o. male s/p hospitalization 4/10 to 4/13 at [facility] for CHARCOT FOOT RECONSTRUCTION on Right  by Dr Ceballos. Pt demonstrates typical signs and sx of gait deviations, dependence in transfers and ADLs, decreased strength and ROM right leg. Pt would benefit from skilled PT to address deficits and work toward safety and indep at home. Pt has DC instructions from hospital to keep dressing and splint clean, dry, and intact, Nonweight bearing to  Right lower extremity, Ice and elevate on blue foam elevator. Pt states he had at least 4 rods put in foot and achillies lengthening.    Past Med Hx: Lymphedema Class II, obesity, Essential hypertension, Diabetes mellitus type 2, with complication, on long term insulin pump, Mixed hyperlipidemia, Leg edema,   Leg swelling, Charcot arthropathy of midfoot   Patient Goal: \"eventually get back walking.\"  Prior level of function: Used knee scooter before for 3 months  Numbness/tingling: yes both legs with neuropathy  Restrictions: NWB right lower leg    Caregiver Status: currently staying at [facility] for a few days until bed set up downstairs in home  ]GG Discharge Goal: amb 150 6  Medication [place]: pt has all meds   Environmental/ Physical issues: no issues in this home, will have to eval safety when he moves  Any additional needs:   Appts 4-18 Dr Ceballos office and PCP  Based upon record review and collaboration conference, the recommended frequency for this patient is   SN-----  PT-----1w5  OT----  ST-----  HHA---  MSW---  Provider__dR Ceballos________ Notified @ ______4-14___ and agrees with POC  "

## 2023-04-14 NOTE — Clinical Note
Huddle  / Case Conference Note  Medical Necessity and focus of care:  Caregiver Status:  Patient's goal(s):  GG Discharge Goal:  Medication [place]:   Environmental/ Physical issues:  Any additional needs:    Based upon record review and collaboration conference, the recommended frequency for this patient is   SN-----  PT-----  OT----  ST-----  HHA---  MSW---  Provider__________ Notified @ _________ and agrees with POC    Please verify your eval  scores: (Answer the scores  that pertain to your area of focus remove the others)              M 1700

## 2023-04-14 NOTE — HOME HEALTH
"Reason for referral: Pt presents a 47 y.o. male s/p hospitalization 4/10 to 4/13 at [facility] for CHARCOT FOOT RECONSTRUCTION on Right  by Dr Ceballos. Pt demonstrates typical signs and sx of gait deviations, dependence in transfers and ADLs, decreased strength and ROM right leg. Pt would benefit from skilled PT to address deficits and work toward safety and indep at home. Pt has DC instructions from hospital to keep dressing and splint clean, dry, and intact, Nonweight bearing to  Right lower extremity, Ice and elevate on blue foam elevator. Pt states he had at least 4 rods put in foot and achillies lengthening.    Past Med Hx: Lymphedema Class II, obesity, Essential hypertension, Diabetes mellitus type 2, with complication, on long term insulin pump, Mixed hyperlipidemia, Leg edema,   Leg swelling, Charcot arthropathy of midfoot   Patient Goal: \"eventually get back walking.\"  Prior level of function: Used knee scooter before for 3 months  Numbness/tingling: yes both legs with neuropathy  Precautions:   Restrictions: NWB right lower leg   Appts 4-18 Dr Ceballos office and PCP"

## 2023-04-15 NOTE — OP NOTE
Date of Procedure: 04/10/2023     SURGEON: Quentin Ceballos DPM      Assistant: Rachel Zelaya MA was responsible for performing the following activities: Retraction, Suction, Irrigation and Placing Dressing and their skilled assistance was necessary for the success of this case.      PREOPERATIVE DIAGNOSIS:   1.  Right ankle contracture  2.  Right foot deformity  3.  Charcot neuroarthropathy right foot     POSTOPERATIVE DIAGNOSIS: Same as preop     PROCEDURES PERFORMED:   1.  Right Achilles tendon lengthening  2.  Triple arthrodesis right foot  3.  Multiple midfoot arthrodesis right foot     ANESTHESIA: General      HEMOSTASIS: Thigh tourniquet set at 300 mmHg     ESTIMATED BLOOD LOSS: 500 mL.     SPECIMEN: none     MATERIALS: 7.0 partially-threaded fusion beam x3, 5.0 fully threaded compression screw x1     COMPLICATIONS: None.      CONDITION: Stable.        INDICATIONS FOR PROCEDURE: This is a patient of mine who has right foot deformity secondary to Charcot.  He agrees to undergo the surgical intervention at this time. Consent is signed and documented in the chart. History and physical exam were reviewed prior to patient being taken to the operating room and n.p.o. status was confirmed. No guarantees were given or implied regarding the outcome of this treatment.      DESCRIPTION OF PROCEDURE: After mild sedation was administered by the anesthesia team in the preoperative holding area the patient was transported to the operating room and placed in a supine position.  General anesthesia was then administered and the right lower extremity was prepped and draped in usual sterile technique and a timeout was performed to confirm the correct patient, correct site, and correct procedure.      Attention was then directed to the right lower extremity was exsanguinated using Esmarch bandage and the tourniquet was rapidly inflated to 300 mmHg.  Attention was directed to the posterior aspect of the ankle where 3 small  incisions were made along the Achilles tendon.  A triple hemisection of the Achilles tendon was performed with increased dorsiflexion at the ankle joint noted following lengthening.  Incision sites were flushed and closed with nylon.  At this time a linear incision was made from the distal tip of the medial malleolus extending distally over the proximal first metatarsal.  Sharp and blunt dissection carried down to the level of bone.  At this time articular cartilage was denuded from the first tarsometatarsal joint, the navicular cuneiform joint and the talonavicular joint.  A second incision was made over the proximal third ray.  Sharp and blunt dissection carried down to level bone.  Second, third and fourth tarsometatarsal joints were identified.  Cartilage was denuded.  A third incision was made extending from the distal tip of the lateral malleolus extending distally over the medial aspect of the fifth tarsometatarsal joint.  Articular cartilage was denuded from the subtalar joint, calcaneocuboid joint and the fourth and fifth tarsometatarsal joints.  Midfoot deformity was reduced and temporarily fixated with multiple K wires.  Intraoperative fluoroscopy confirms adequate reduction of Charcot deformity.  Next deformity was fixated with three 7.0 partially-threaded beams.  The first beam was inserted through the distal first metatarsal head transversing proximally through the first tarsometatarsal joint, navicular cuneiform joint, and talonavicular joint.  The second beam was inserted through the distal head of the second metatarsal extending approximately through the second tarsometatarsal joint, naviculocuneiform and into the calcaneus.  A third beam was inserted through the fourth metatarsal head extending proximally through the fourth tarsometatarsal joint the calcaneocuboid joint into the calcaneus.  Intraoperative fluoroscopy was used throughout the entire process to ensure that reduction of the midfoot  deformity was obtained and that hardware is well-placed.  Lastly under fluoroscopy a fully threaded cannulated screw was inserted through the posterior facet of the subtalar joint from the calcaneal tuberosity into the talus.  Final fluoroscopic images were obtained.  Operative sites were flushed and closed with Monocryl and nylon.  Sterile dressing followed by a well-padded posterior mold splint was applied.  Tourniquet was deflated with rapid hyperemic response to the distal digits.  Patient tolerated the procedure and anesthesia well and was transported from the operating room to the PACU with vital signs stable and neurovascular status intact to the operative extremity.    Plan to admit patient postoperatively for pain control.  Nonweightbearing right lower extremity.            This document has been electronically signed by Quentin Ceballos DPM on April 15, 2023 14:14 CDT

## 2023-04-18 ENCOUNTER — OFFICE VISIT (OUTPATIENT)
Dept: FAMILY MEDICINE CLINIC | Facility: CLINIC | Age: 48
End: 2023-04-18
Payer: MEDICARE

## 2023-04-18 ENCOUNTER — OFFICE VISIT (OUTPATIENT)
Dept: PODIATRY | Facility: CLINIC | Age: 48
End: 2023-04-18
Payer: MEDICARE

## 2023-04-18 VITALS
OXYGEN SATURATION: 98 % | SYSTOLIC BLOOD PRESSURE: 116 MMHG | HEART RATE: 81 BPM | BODY MASS INDEX: 43.67 KG/M2 | HEIGHT: 72 IN | DIASTOLIC BLOOD PRESSURE: 66 MMHG

## 2023-04-18 VITALS — HEIGHT: 72 IN | BODY MASS INDEX: 42.66 KG/M2 | HEART RATE: 82 BPM | WEIGHT: 315 LBS | OXYGEN SATURATION: 98 %

## 2023-04-18 DIAGNOSIS — M14.679 CHARCOT ARTHROPATHY OF MIDFOOT: Primary | ICD-10-CM

## 2023-04-18 DIAGNOSIS — M79.671 RIGHT FOOT PAIN: ICD-10-CM

## 2023-04-18 DIAGNOSIS — I89.0 LYMPHEDEMA: ICD-10-CM

## 2023-04-18 DIAGNOSIS — Z09 HOSPITAL DISCHARGE FOLLOW-UP: Primary | ICD-10-CM

## 2023-04-18 DIAGNOSIS — Z79.4 TYPE 2 DIABETES MELLITUS WITH DIABETIC POLYNEUROPATHY, WITH LONG-TERM CURRENT USE OF INSULIN: ICD-10-CM

## 2023-04-18 DIAGNOSIS — E11.42 TYPE 2 DIABETES MELLITUS WITH DIABETIC POLYNEUROPATHY, WITH LONG-TERM CURRENT USE OF INSULIN: ICD-10-CM

## 2023-04-18 NOTE — PROGRESS NOTES
Taylor Le  1975  47 y.o. male   PCP: Rachel Sue 12/05/2022  BS: 140 per pt    4/18/2023      History of Present Illness    Taylor Le is a 47 y.o.male return to clinic for a post operative appointment on charcot reconstruction of right foot.       Past Medical History:   Diagnosis Date   • Diabetes mellitus    • Hyperlipidemia    • Hypertension          Past Surgical History:   Procedure Laterality Date   • APPENDECTOMY     • CATARACT EXTRACTION Bilateral    • RETINAL DETACHMENT REPAIR Right 2010   • RETINAL DETACHMENT SURGERY Left 2009         Family History   Problem Relation Age of Onset   • Heart disease Mother    • Diabetes Mother    • Diabetes Father    • Diabetes Sister    • Hypertension Sister    • Diabetes Maternal Grandmother    • Heart disease Maternal Grandfather    • Diabetes Paternal Grandmother    • Cancer Paternal Grandfather        Allergies   Allergen Reactions   • Phenobarbital Seizure       Social History     Socioeconomic History   • Marital status:    Tobacco Use   • Smoking status: Never   • Smokeless tobacco: Never   Vaping Use   • Vaping Use: Never used   Substance and Sexual Activity   • Alcohol use: Never   • Drug use: Never   • Sexual activity: Defer         Current Outpatient Medications   Medication Sig Dispense Refill   • ascorbic acid (VITAMIN C) 500 MG tablet Take 1 tablet by mouth Daily.     • aspirin (Angel Aspirin) 325 MG tablet Take 1 tablet by mouth Every 12 (Twelve) Hours. 30 tablet 1   • Continuous Blood Gluc  (Dexcom G6 ) device 1 each 1 (One) Time As Needed (one device a year) for up to 1 dose. 1 each 0   • Continuous Blood Gluc  (FreeStyle Sander 14 Day [place]) device 1 Device As Needed (Check blood sugar 4 times a day and  PRN). 1 each 0   • Continuous Blood Gluc Sensor (Dexcom G6 Sensor) Every 10 (Ten) Days. 3 each 3   • Continuous Blood Gluc Sensor (FreeStyle Sander 14 Day Sensor) misc 1 each Every 14 (Fourteen) Days. 3 each 11   •  "Continuous Blood Gluc Transmit (Dexcom G6 Transmitter) misc 1 each Every 3 (Three) Months. 1 each 3   • furosemide (Lasix) 40 MG tablet Take 1 tablet by mouth Daily. 90 tablet 0   • gabapentin (Neurontin) 300 MG capsule Take 1 capsule by mouth 3 (Three) Times a Day. 30 capsule 0   • Glucagon (Gvoke HypoPen 2-Pack) 1 MG/0.2ML solution auto-injector Inject 1 mg under the skin into the appropriate area as directed As Needed (for hypoglycemia). 0.4 mL 1   • Insulin Aspart (NovoLOG) 100 UNIT/ML injection Up to 150 units daily through pumpE10.65 5 mL 11   • Insulin Glargine (LANTUS SOLOSTAR) 100 UNIT/ML injection pen Inject 30 Units under the skin into the appropriate area as directed 2 (Two) Times a Day. 10 pen 11   • lisinopril-hydrochlorothiazide (PRINZIDE,ZESTORETIC) 20-25 MG per tablet Take 1 tablet by mouth Daily. 30 tablet 11   • multivitamin with minerals tablet tablet Take 1 tablet by mouth Daily.     • oxyCODONE-acetaminophen (Percocet)  MG per tablet Take 1 tablet by mouth Every 4 (Four) Hours As Needed for Moderate Pain. 30 tablet 0   • rosuvastatin (CRESTOR) 10 MG tablet TAKE ONE TABLET BY MOUTH ONCE NIGHTLY 90 tablet 3     No current facility-administered medications for this visit.       Review of Systems   Musculoskeletal:        Foot pain   All other systems reviewed and are negative.        OBJECTIVE    Pulse 82   Ht 182.9 cm (72.01\")   Wt (!) 146 kg (322 lb)   SpO2 98%   BMI 43.66 kg/m²     Physical Exam  Vitals reviewed.   Constitutional:       General: He is not in acute distress.     Appearance: He is well-developed.   HENT:      Head: Normocephalic and atraumatic.      Nose: Nose normal.   Eyes:      Conjunctiva/sclera: Conjunctivae normal.      Pupils: Pupils are equal, round, and reactive to light.   Pulmonary:      Effort: Pulmonary effort is normal. No respiratory distress.      Breath sounds: No wheezing.   Musculoskeletal:      Right lower leg: 3+ Edema present.   Skin:     General: " Skin is warm and dry.      Capillary Refill: Capillary refill takes less than 2 seconds.   Neurological:      Mental Status: He is alert and oriented to person, place, and time.   Psychiatric:         Behavior: Behavior normal.         Thought Content: Thought content normal.          Right lower extremity: Incision sites well approximated.  No signs of dehiscence.  Moderate edema.  Negative Homans.      Procedures        ASSESSMENT AND PLAN    Diagnoses and all orders for this visit:    1. Charcot arthropathy of midfoot (Primary)    2. Type 2 diabetes mellitus with diabetic polyneuropathy, with long-term current use of insulin    3. Lymphedema        -Patient doing well postoperatively.  Dressing change.  Recheck 2 weeks          This document has been electronically signed by Quentin Ceballos DPM on April 29, 2023 13:06 CDT     4/29/2023  13:06 CDT

## 2023-04-18 NOTE — PROGRESS NOTES
Chief Complaint  Follow-up (Hospital /Post op 4/10/23)    Subjective          Taylor Le presents to [facility] [place]  For hospital follow up from surgery on 4/10/2023. Patient states he is doing well, pain is controlled at this time. He is having some numbing/tingling that occurred once he woke up from surgery at this slowly improving    Copied from hospital chart:    Hospital Course  Patient is a 47 y.o. male presented for surgical intervention to the right foot.  Surgery was performed on April 10.  Patient was admitted postoperatively for pain control and evaluation with PT/OT.  Patient had an uneventful stay and was discharged home on April 13 in stable condition.         Foot Injury   The incident occurred more than 1 week ago (surgery). There was no injury mechanism. The pain is present in the right foot. The pain is mild. The pain has been fluctuating since onset. Associated symptoms include an inability to bear weight. He reports no foreign bodies present. The symptoms are aggravated by weight bearing. He has tried elevation, rest and non-weight bearing for the symptoms. The treatment provided moderate relief.     Outpatient Medications Prior to Visit   Medication Sig Dispense Refill   • ascorbic acid (VITAMIN C) 500 MG tablet Take 1 tablet by mouth Daily.     • aspirin (Angel Aspirin) 325 MG tablet Take 1 tablet by mouth Every 12 (Twelve) Hours. 30 tablet 1   • Continuous Blood Gluc  (Dexcom G6 ) device 1 each 1 (One) Time As Needed (one device a year) for up to 1 dose. 1 each 0   • Continuous Blood Gluc  (FreeStyle Sander 14 Day [place]) device 1 Device As Needed (Check blood sugar 4 times a day and  PRN). 1 each 0   • Continuous Blood Gluc Sensor (Dexcom G6 Sensor) Every 10 (Ten) Days. 3 each 3   • Continuous Blood Gluc Sensor (FreeStyle Sander 14 Day Sensor) misc 1 each Every 14 (Fourteen) Days. 3 each 11   • Continuous Blood Gluc  "Transmit (Dexcom G6 Transmitter) misc 1 each Every 3 (Three) Months. 1 each 3   • furosemide (Lasix) 40 MG tablet Take 1 tablet by mouth Daily. 90 tablet 0   • gabapentin (Neurontin) 300 MG capsule Take 1 capsule by mouth 3 (Three) Times a Day. 30 capsule 0   • Glucagon (Gvoke HypoPen 2-Pack) 1 MG/0.2ML solution auto-injector Inject 1 mg under the skin into the appropriate area as directed As Needed (for hypoglycemia). 0.4 mL 1   • Insulin Aspart (NovoLOG) 100 UNIT/ML injection Up to 150 units daily through pumpE10.65 5 mL 11   • Insulin Glargine (LANTUS SOLOSTAR) 100 UNIT/ML injection pen Inject 30 Units under the skin into the appropriate area as directed 2 (Two) Times a Day. 10 pen 11   • lisinopril-hydrochlorothiazide (PRINZIDE,ZESTORETIC) 20-25 MG per tablet Take 1 tablet by mouth Daily. 30 tablet 11   • multivitamin with minerals tablet tablet Take 1 tablet by mouth Daily.     • oxyCODONE-acetaminophen (Percocet)  MG per tablet Take 1 tablet by mouth Every 4 (Four) Hours As Needed for Moderate Pain. 30 tablet 0   • rosuvastatin (Crestor) 10 MG tablet Take 1 tablet by mouth Every Night. 30 tablet 11     No facility-administered medications prior to visit.       Review of Systems      Objective   Vital Signs:   Visit Vitals  /66 (BP Location: Left arm, Patient Position: Sitting, Cuff Size: Large Adult)   Pulse 81   Ht 182.9 cm (72.01\")   SpO2 98%   BMI 43.67 kg/m²     Physical Exam  Vitals and nursing note reviewed.   Constitutional:       Appearance: Normal appearance. He is well-developed.   HENT:      Head: Normocephalic and atraumatic.      Right Ear: Hearing normal.      Left Ear: Hearing normal.      Nose: Nose normal. No mucosal edema or rhinorrhea.   Eyes:      General: Lids are normal.      Conjunctiva/sclera: Conjunctivae normal.      Pupils: Pupils are equal, round, and reactive to light.   Neck:      Thyroid: No thyroid mass or thyromegaly.      Trachea: Trachea normal. No tracheal " tenderness or tracheal deviation.   Cardiovascular:      Rate and Rhythm: Normal rate.      Pulses: Normal pulses.      Heart sounds: Normal heart sounds.   Pulmonary:      Effort: Pulmonary effort is normal. No respiratory distress.      Breath sounds: Normal breath sounds. No stridor. No wheezing or rales.   Abdominal:      Palpations: Abdomen is soft.   Musculoskeletal:         General: Normal range of motion.      Cervical back: Normal range of motion.        Feet:    Feet:      Comments: Wrapped   Skin:     General: Skin is warm and dry.   Neurological:      Mental Status: He is alert and oriented to person, place, and time.   Psychiatric:         Mood and Affect: Mood is not anxious. Affect is not inappropriate.         Speech: Speech normal.         Behavior: Behavior normal. Behavior is not agitated or hyperactive.         Thought Content: Thought content normal.         Judgment: Judgment normal.        Result Review :                 Assessment and Plan    Diagnoses and all orders for this visit:    1. Hospital discharge follow-up (Primary)    2. Right foot pain        Continue current medications  Continue to follow with podiatry as well as PT    Please call the office if you have any issues or increased pain    May use NSAIDs for left arm discomfort as needed    Follow Up   Return if symptoms worsen or fail to improve, for Next scheduled follow up.  Patient was given instructions and counseling regarding his condition or for health maintenance advice. Please see specific information pulled into the AVS if appropriate.           This document has been electronically signed by TRISHA Parra on April 18, 2023 13:04 CDT

## 2023-04-19 ENCOUNTER — HOME CARE VISIT (OUTPATIENT)
Dept: HOME HEALTH SERVICES | Facility: CLINIC | Age: 48
End: 2023-04-19
Payer: MEDICARE

## 2023-04-19 PROCEDURE — G0157 HHC PT ASSISTANT EA 15: HCPCS

## 2023-04-20 VITALS
RESPIRATION RATE: 18 BRPM | DIASTOLIC BLOOD PRESSURE: 70 MMHG | SYSTOLIC BLOOD PRESSURE: 132 MMHG | OXYGEN SATURATION: 98 % | TEMPERATURE: 98.7 F | HEART RATE: 78 BPM

## 2023-04-24 ENCOUNTER — HOME CARE VISIT (OUTPATIENT)
Dept: HOME HEALTH SERVICES | Facility: CLINIC | Age: 48
End: 2023-04-24
Payer: MEDICARE

## 2023-04-24 PROCEDURE — G0157 HHC PT ASSISTANT EA 15: HCPCS

## 2023-04-24 RX ORDER — ROSUVASTATIN CALCIUM 10 MG/1
TABLET, COATED ORAL
Qty: 90 TABLET | Refills: 3 | Status: SHIPPED | OUTPATIENT
Start: 2023-04-24

## 2023-04-25 VITALS
OXYGEN SATURATION: 98 % | DIASTOLIC BLOOD PRESSURE: 60 MMHG | HEART RATE: 80 BPM | RESPIRATION RATE: 18 BRPM | SYSTOLIC BLOOD PRESSURE: 118 MMHG | TEMPERATURE: 97.7 F

## 2023-05-01 ENCOUNTER — HOME CARE VISIT (OUTPATIENT)
Dept: HOME HEALTH SERVICES | Facility: HOME HEALTHCARE | Age: 48
End: 2023-05-01
Payer: MEDICARE

## 2023-05-09 ENCOUNTER — HOME CARE VISIT (OUTPATIENT)
Dept: HOME HEALTH SERVICES | Facility: CLINIC | Age: 48
End: 2023-05-09
Payer: MEDICARE

## 2023-05-09 PROCEDURE — G0151 HHCP-SERV OF PT,EA 15 MIN: HCPCS

## 2023-05-11 VITALS
OXYGEN SATURATION: 97 % | DIASTOLIC BLOOD PRESSURE: 66 MMHG | HEART RATE: 68 BPM | TEMPERATURE: 96.9 F | SYSTOLIC BLOOD PRESSURE: 122 MMHG | RESPIRATION RATE: 18 BRPM

## 2023-05-17 ENCOUNTER — OFFICE VISIT (OUTPATIENT)
Dept: PODIATRY | Facility: CLINIC | Age: 48
End: 2023-05-17
Payer: MEDICARE

## 2023-05-17 VITALS
DIASTOLIC BLOOD PRESSURE: 73 MMHG | OXYGEN SATURATION: 97 % | SYSTOLIC BLOOD PRESSURE: 131 MMHG | HEART RATE: 82 BPM | BODY MASS INDEX: 42.66 KG/M2 | HEIGHT: 72 IN | WEIGHT: 315 LBS

## 2023-05-17 DIAGNOSIS — M14.679 CHARCOT ARTHROPATHY OF MIDFOOT: Primary | ICD-10-CM

## 2023-05-17 DIAGNOSIS — L89.610 PRESSURE INJURY OF RIGHT HEEL, UNSTAGEABLE: ICD-10-CM

## 2023-05-17 NOTE — PROGRESS NOTES
Taylor Le  1975  47 y.o. male   PCP: Rachel Sue 12/05/2022  BS: 150 per pt    05/17/2023        History of Present Illness    Taylor Le is a 47 y.o.male return to clinic for a post operative appointment on 4/10 he had charcot reconstruction of right foot.       Past Medical History:   Diagnosis Date   • Diabetes mellitus    • Hyperlipidemia    • Hypertension          Past Surgical History:   Procedure Laterality Date   • APPENDECTOMY     • CATARACT EXTRACTION Bilateral    • CHARCOT FOOT RECONSTRUCTION Right 4/10/2023    Procedure: CHARCOT FOOT RECONSTRUCTION AND ALL OTHER INDICATED PROCEDURES;  Surgeon: Quentin Ceballos DPM;  Location: [facility];  Service: Podiatry;  Laterality: Right;   • RETINAL DETACHMENT REPAIR Right 2010   • RETINAL DETACHMENT SURGERY Left 2009         Family History   Problem Relation Age of Onset   • Heart disease Mother    • Diabetes Mother    • Diabetes Father    • Diabetes Sister    • Hypertension Sister    • Diabetes Maternal Grandmother    • Heart disease Maternal Grandfather    • Diabetes Paternal Grandmother    • Cancer Paternal Grandfather        Allergies   Allergen Reactions   • Phenobarbital Seizure       Social History     Socioeconomic History   • Marital status:    Tobacco Use   • Smoking status: Never   • Smokeless tobacco: Never   Vaping Use   • Vaping Use: Never used   Substance and Sexual Activity   • Alcohol use: Never   • Drug use: Never   • Sexual activity: Defer         Current Outpatient Medications   Medication Sig Dispense Refill   • ascorbic acid (VITAMIN C) 500 MG tablet Take 1 tablet by mouth Daily.     • aspirin (Angel Aspirin) 325 MG tablet Take 1 tablet by mouth Every 12 (Twelve) Hours. 30 tablet 1   • Continuous Blood Gluc  (Dexcom G6 ) device 1 each 1 (One) Time As Needed (one device a year) for up to 1 dose. 1 each 0   • Continuous Blood Gluc  ([employer] Sander 14 Day [place]) device 1 Device As Needed (Check blood  "sugar 4 times a day and  PRN). 1 each 0   • Continuous Blood Gluc Sensor (Dexcom G6 Sensor) Every 10 (Ten) Days. 3 each 3   • Continuous Blood Gluc Sensor (FreeStyle Sander 14 Day Sensor) misc 1 each Every 14 (Fourteen) Days. 3 each 11   • Continuous Blood Gluc Transmit (Dexcom G6 Transmitter) misc 1 each Every 3 (Three) Months. 1 each 3   • furosemide (Lasix) 40 MG tablet Take 1 tablet by mouth Daily. 90 tablet 0   • gabapentin (Neurontin) 300 MG capsule Take 1 capsule by mouth 3 (Three) Times a Day. 30 capsule 0   • Glucagon (Gvoke HypoPen 2-Pack) 1 MG/0.2ML solution auto-injector Inject 1 mg under the skin into the appropriate area as directed As Needed (for hypoglycemia). 0.4 mL 1   • Insulin Aspart (NovoLOG) 100 UNIT/ML injection Up to 150 units daily through pumpE10.65 5 mL 11   • Insulin Glargine (LANTUS SOLOSTAR) 100 UNIT/ML injection pen Inject 30 Units under the skin into the appropriate area as directed 2 (Two) Times a Day. 10 pen 11   • lisinopril-hydrochlorothiazide (PRINZIDE,ZESTORETIC) 20-25 MG per tablet Take 1 tablet by mouth Daily. 30 tablet 11   • multivitamin with minerals tablet tablet Take 1 tablet by mouth Daily.     • oxyCODONE-acetaminophen (Percocet)  MG per tablet Take 1 tablet by mouth Every 4 (Four) Hours As Needed for Moderate Pain. 30 tablet 0   • rosuvastatin (CRESTOR) 10 MG tablet TAKE ONE TABLET BY MOUTH ONCE NIGHTLY 90 tablet 3     No current facility-administered medications for this visit.       Review of Systems   Musculoskeletal:        Foot pain   All other systems reviewed and are negative.        OBJECTIVE    /73   Pulse 82   Ht 182.9 cm (72\")   Wt (!) 146 kg (322 lb)   SpO2 97%   BMI 43.67 kg/m²     Physical Exam  Vitals reviewed.   Constitutional:       General: He is not in acute distress.     Appearance: He is well-developed.   HENT:      Head: Normocephalic and atraumatic.      Nose: Nose normal.   Eyes:      Conjunctiva/sclera: Conjunctivae normal.      " Pupils: Pupils are equal, round, and reactive to light.   Pulmonary:      Effort: Pulmonary effort is normal. No respiratory distress.      Breath sounds: No wheezing.   Musculoskeletal:      Right lower leg: 3+ Edema present.   Skin:     General: Skin is warm and dry.      Capillary Refill: Capillary refill takes less than 2 seconds.   Neurological:      Mental Status: He is alert and oriented to person, place, and time.   Psychiatric:         Behavior: Behavior normal.         Thought Content: Thought content normal.          Right lower extremity: Incision sites well coapted.  Moderate edema.  Negative Homans.  Unstageable heel ulcer.  No signs of infection.      Procedures        ASSESSMENT AND PLAN    Diagnoses and all orders for this visit:    1. Charcot arthropathy of midfoot (Primary)  -     Cancel: XR Foot 3+ View Right    2. Pressure injury of right heel, unstageable        -Patient doing well postoperatively.  Excisional debridement to heel.  Reapplied cast.  Nonweightbearing right lower extremity.  Recheck 2 weeks          This document has been electronically signed by Quentin Ceballos DPM on May 17, 2023 16:09 CDT     5/17/2023  16:09 CDT

## 2023-05-22 ENCOUNTER — OUTSIDE FACILITY SERVICE (OUTPATIENT)
Dept: WOUND CARE | Facility: HOSPITAL | Age: 48
End: 2023-05-22
Payer: MEDICARE

## 2023-05-22 ENCOUNTER — OFFICE VISIT (OUTPATIENT)
Dept: WOUND CARE | Facility: HOSPITAL | Age: 48
End: 2023-05-22
Payer: MEDICARE

## 2023-05-22 DIAGNOSIS — R09.89 WEAK PULSE: Primary | ICD-10-CM

## 2023-05-24 ENCOUNTER — OUTSIDE FACILITY SERVICE (OUTPATIENT)
Dept: WOUND CARE | Facility: HOSPITAL | Age: 48
End: 2023-05-24

## 2023-05-24 ENCOUNTER — OFFICE VISIT (OUTPATIENT)
Dept: WOUND CARE | Facility: HOSPITAL | Age: 48
End: 2023-05-24
Payer: MEDICARE

## 2023-05-24 PROCEDURE — 97602 WOUND(S) CARE NON-SELECTIVE: CPT

## 2023-05-25 ENCOUNTER — OFFICE VISIT (OUTPATIENT)
Dept: WOUND CARE | Facility: HOSPITAL | Age: 48
End: 2023-05-25
Payer: MEDICARE

## 2023-05-31 ENCOUNTER — OUTSIDE FACILITY SERVICE (OUTPATIENT)
Dept: WOUND CARE | Facility: HOSPITAL | Age: 48
End: 2023-05-31
Payer: MEDICARE

## 2023-05-31 ENCOUNTER — OFFICE VISIT (OUTPATIENT)
Dept: WOUND CARE | Facility: HOSPITAL | Age: 48
End: 2023-05-31

## 2023-06-01 ENCOUNTER — TRANSCRIBE ORDERS (OUTPATIENT)
Dept: HOME HEALTH SERVICES | Facility: HOME HEALTHCARE | Age: 48
End: 2023-06-01
Payer: MEDICARE

## 2023-06-01 ENCOUNTER — HOME HEALTH ADMISSION (OUTPATIENT)
Dept: HOME HEALTH SERVICES | Facility: HOME HEALTHCARE | Age: 48
End: 2023-06-01
Payer: MEDICARE

## 2023-06-01 DIAGNOSIS — L89.610 UNSTAGEABLE PRESSURE ULCER OF RIGHT HEEL: Primary | ICD-10-CM

## 2023-06-02 ENCOUNTER — HOME CARE VISIT (OUTPATIENT)
Dept: HOME HEALTH SERVICES | Facility: CLINIC | Age: 48
End: 2023-06-02
Payer: MEDICARE

## 2023-06-02 VITALS
RESPIRATION RATE: 18 BRPM | DIASTOLIC BLOOD PRESSURE: 64 MMHG | HEART RATE: 68 BPM | SYSTOLIC BLOOD PRESSURE: 126 MMHG | OXYGEN SATURATION: 98 % | TEMPERATURE: 97.1 F

## 2023-06-02 DIAGNOSIS — M79.89 LEG SWELLING: ICD-10-CM

## 2023-06-02 PROCEDURE — G0299 HHS/HOSPICE OF RN EA 15 MIN: HCPCS

## 2023-06-02 RX ORDER — FUROSEMIDE 40 MG/1
TABLET ORAL
Qty: 90 TABLET | Refills: 0 | OUTPATIENT
Start: 2023-06-02

## 2023-06-03 NOTE — CASE COMMUNICATION
"Medical Necessity and focus of care: Patient had right foot surgery for Charcot arthropathy.  Patient goes to wound clinic for wound care and orders. Patient using knee scooter for mobility. Surgical incision to top of foot. Wound to heal.  Sn to complete wound care weekly.  Caregiver Status:lives with wife to assist with care  Patient's goal(s):\"heal wound\"  GG Discharge Goal: independent with transfers  Medication [place]:none   Environ mental/ Physical issues: steps to home  Any additional needs:    Based upon record review and collaboration conference, the recommended frequency for this patient is   SN-----1w4  Provider___Kaden RICO_______ Notified @ __6/2/23_______ and agrees with POC   "

## 2023-06-05 ENCOUNTER — OFFICE VISIT (OUTPATIENT)
Dept: WOUND CARE | Facility: HOSPITAL | Age: 48
End: 2023-06-05
Payer: MEDICARE

## 2023-06-05 ENCOUNTER — OUTSIDE FACILITY SERVICE (OUTPATIENT)
Dept: WOUND CARE | Facility: HOSPITAL | Age: 48
End: 2023-06-05
Payer: MEDICARE

## 2023-06-08 ENCOUNTER — HOME CARE VISIT (OUTPATIENT)
Dept: HOME HEALTH SERVICES | Facility: CLINIC | Age: 48
End: 2023-06-08
Payer: MEDICARE

## 2023-06-08 VITALS
DIASTOLIC BLOOD PRESSURE: 70 MMHG | OXYGEN SATURATION: 98 % | TEMPERATURE: 98.1 F | HEART RATE: 66 BPM | RESPIRATION RATE: 20 BRPM | SYSTOLIC BLOOD PRESSURE: 128 MMHG

## 2023-06-08 PROCEDURE — G0300 HHS/HOSPICE OF LPN EA 15 MIN: HCPCS

## 2023-06-12 ENCOUNTER — HOME CARE VISIT (OUTPATIENT)
Dept: HOME HEALTH SERVICES | Facility: CLINIC | Age: 48
End: 2023-06-12
Payer: MEDICARE

## 2023-06-12 VITALS
DIASTOLIC BLOOD PRESSURE: 64 MMHG | TEMPERATURE: 98.6 F | SYSTOLIC BLOOD PRESSURE: 122 MMHG | RESPIRATION RATE: 18 BRPM | HEART RATE: 82 BPM | OXYGEN SATURATION: 98 %

## 2023-06-12 PROCEDURE — G0299 HHS/HOSPICE OF RN EA 15 MIN: HCPCS

## 2023-06-13 ENCOUNTER — OFFICE VISIT (OUTPATIENT)
Dept: ENDOCRINOLOGY | Facility: CLINIC | Age: 48
End: 2023-06-13
Payer: MEDICARE

## 2023-06-13 VITALS
BODY MASS INDEX: 38.6 KG/M2 | HEART RATE: 66 BPM | SYSTOLIC BLOOD PRESSURE: 120 MMHG | DIASTOLIC BLOOD PRESSURE: 70 MMHG | OXYGEN SATURATION: 99 % | WEIGHT: 285 LBS | HEIGHT: 72 IN

## 2023-06-13 DIAGNOSIS — E10.9 WELL CONTROLLED TYPE 1 DIABETES MELLITUS: Primary | ICD-10-CM

## 2023-06-13 DIAGNOSIS — G47.33 OBSTRUCTIVE SLEEP APNEA: ICD-10-CM

## 2023-06-13 DIAGNOSIS — E78.2 MIXED DIABETIC HYPERLIPIDEMIA ASSOCIATED WITH TYPE 1 DIABETES MELLITUS: ICD-10-CM

## 2023-06-13 DIAGNOSIS — E10.42 DIABETIC POLYNEUROPATHY ASSOCIATED WITH TYPE 1 DIABETES MELLITUS: ICD-10-CM

## 2023-06-13 DIAGNOSIS — E10.69 MIXED DIABETIC HYPERLIPIDEMIA ASSOCIATED WITH TYPE 1 DIABETES MELLITUS: ICD-10-CM

## 2023-06-13 DIAGNOSIS — I10 PRIMARY HYPERTENSION: ICD-10-CM

## 2023-06-13 NOTE — PROGRESS NOTES
"CC, Type 1 DM                                 History of Present Illness    47 y.o. male     Diabetes Type 1    Duration - since age 15 years old    Complications -    Neuropathy , charcot   Retinopathy , cataracts, vision loss    Present Monitoring -      Fingersticks -     CGM - tandem , dexcom     Present Regimen -  Basal, bolus insulin    Carb Intake -   Counts carbs     Exercise -   None    Symptoms -     Numbness, tingling   ==========================================  PE    /70   Pulse 66   Ht 182.9 cm (72\")   Wt 129 kg (285 lb)   SpO2 99%   BMI 38.65 kg/m²   AOx3  No visible goiter  Normal Respiratory Effort   Lymphedema  Right Charcot       Labs    Lab Results   Component Value Date    WBC  04/13/2023      Comment:      See scanned report      HGB  04/13/2023      Comment:      See scanned report      HCT  04/13/2023      Comment:      See scanned report      MCV  04/13/2023      Comment:      See scanned report      PLT  04/13/2023      Comment:      See scanned report       Lab Results   Component Value Date    GLUCOSE 100 (H) 04/13/2023    BUN 14 04/13/2023    CREATININE 0.97 04/13/2023    EGFRIFNONA 91 12/22/2021    BCR 14.4 04/13/2023     (L) 04/13/2023    K 4.7 04/13/2023    CO2 28.0 04/13/2023    CALCIUM 8.5 (L) 04/13/2023    ALBUMIN 3.2 (L) 04/11/2023    LABIL2 1.6 07/19/2021    AST 36 04/11/2023    ALT 23 04/11/2023                 ==========================================      ICD-10-CM ICD-9-CM   1. Well controlled type 1 diabetes mellitus  E10.9 250.01   2. Mixed diabetic hyperlipidemia associated with type 1 diabetes mellitus  E10.69 250.81    E78.2 272.2   3. Primary hypertension  I10 401.9   4. Diabetic polyneuropathy associated with type 1 diabetes mellitus  E10.42 250.61     357.2   5. Obstructive sleep apnea  G47.33 327.23       Diabetes " "[place]    --------------------------------------------------------------------------------------------------------------------------------------------    Glycemic Management   Lab Results   Component Value Date    HGBA1C 6.2 02/15/2023     Tandem , dexcom     2 week data May 31, Jun 13, 2023     Above 22%  In target 77%  Lows 0%       Tandem     Basal 1.83  Correction 20   Carb ratio 5     avg carbs 139  Per day 139    Correction boluses 50%  Increase carb ratio to 4     Glucagon  rx    n    ==========================================================================  Microvascular Complication Monitoring    Neuropathy                       Yes, Charcot   Retinopathy    yes   Renal     GFR   Lab Results   Component Value Date    EGFR 98.1 04/13/2023    EGFR 80.0 04/11/2023    EGFR 100.6 03/31/2023       Albuminuria   Lab Results   Component Value Date    MALBCRERATIO  11/02/2022      Comment:      Unable to calculate            ==========================================================================    Lipids  Lab Results   Component Value Date    CHOL 167 11/02/2022    CHLPL 199 05/15/2020    TRIG 70 11/02/2022    HDL 48 11/02/2022     (H) 11/02/2022       Statin  crestor 10 mg qhs     ==========================================================================    HTN  /70   Pulse 66   Ht 182.9 cm (72\")   Wt 129 kg (285 lb)   SpO2 99%   BMI 38.65 kg/m²     Lisinopril - hctz   Lasix       ==========================================================================    Bone Health    Vit D    No results found for: [id number]    Calcium intake     ==========================================================================    Thyroid Assessment  Lab Results   Component Value Date    TSH 3.450 11/02/2022           ==========================================================================    Vitamin B12  Lab Results   Component Value Date    AZOVNYWR89 575 11/02/2022 "         ==========================================================================    Celiac Panel     Pending     Sleep apnea     refer to sleep medicine   Doesn't believe he has     No orders of the defined types were placed in this encounter.               This document has been electronically signed by Bernabe Juarez MD on June 13, 2023 09:22 CDT

## 2023-06-16 ENCOUNTER — OFFICE VISIT (OUTPATIENT)
Dept: WOUND CARE | Facility: HOSPITAL | Age: 48
End: 2023-06-16
Payer: MEDICARE

## 2023-06-18 ENCOUNTER — HOME CARE VISIT (OUTPATIENT)
Dept: HOME HEALTH SERVICES | Facility: CLINIC | Age: 48
End: 2023-06-18
Payer: MEDICARE

## 2023-06-18 VITALS
TEMPERATURE: 97.2 F | DIASTOLIC BLOOD PRESSURE: 82 MMHG | OXYGEN SATURATION: 97 % | HEART RATE: 66 BPM | RESPIRATION RATE: 18 BRPM | SYSTOLIC BLOOD PRESSURE: 130 MMHG

## 2023-06-18 PROCEDURE — G0299 HHS/HOSPICE OF RN EA 15 MIN: HCPCS

## 2023-06-19 NOTE — CASE COMMUNICATION
Wound clarification order needed:  order was received after Friday's appointment for santyl and adaptic to right medial foot with profore and ONLY profore to right heel however when SN took dressing off that was applied in office there was polymem to right heel and no adaptic to right medial foot. Patient stated he was told Santyl ointment was to be applied to his heel wound.  Please clarify as soon as possible.  Thank you.

## 2023-07-21 ENCOUNTER — OUTSIDE FACILITY SERVICE (OUTPATIENT)
Dept: PODIATRY | Facility: CLINIC | Age: 48
End: 2023-07-21
Payer: MEDICARE

## 2023-07-25 ENCOUNTER — HOME CARE VISIT (OUTPATIENT)
Dept: HOME HEALTH SERVICES | Facility: CLINIC | Age: 48
End: 2023-07-25
Payer: MEDICARE

## 2023-07-25 VITALS
DIASTOLIC BLOOD PRESSURE: 80 MMHG | TEMPERATURE: 97.6 F | SYSTOLIC BLOOD PRESSURE: 146 MMHG | HEART RATE: 83 BPM | RESPIRATION RATE: 20 BRPM | OXYGEN SATURATION: 98 %

## 2023-07-25 PROCEDURE — G0300 HHS/HOSPICE OF LPN EA 15 MIN: HCPCS

## 2023-07-28 ENCOUNTER — OUTSIDE FACILITY SERVICE (OUTPATIENT)
Dept: PODIATRY | Facility: CLINIC | Age: 48
End: 2023-07-28
Payer: MEDICARE

## 2023-07-28 ENCOUNTER — OFFICE VISIT (OUTPATIENT)
Dept: WOUND CARE | Facility: HOSPITAL | Age: 48
End: 2023-07-28
Payer: MEDICARE

## 2023-07-28 PROCEDURE — 97605 NEG PRS WND THER DME<=50SQCM: CPT

## 2023-07-31 ENCOUNTER — HOME CARE VISIT (OUTPATIENT)
Dept: HOME HEALTH SERVICES | Facility: HOME HEALTHCARE | Age: 48
End: 2023-07-31
Payer: MEDICARE

## 2023-07-31 ENCOUNTER — HOME CARE VISIT (OUTPATIENT)
Dept: HOME HEALTH SERVICES | Facility: CLINIC | Age: 48
End: 2023-07-31
Payer: MEDICARE

## 2023-07-31 VITALS
TEMPERATURE: 98 F | RESPIRATION RATE: 18 BRPM | SYSTOLIC BLOOD PRESSURE: 120 MMHG | HEART RATE: 71 BPM | DIASTOLIC BLOOD PRESSURE: 78 MMHG | OXYGEN SATURATION: 98 %

## 2023-07-31 PROCEDURE — G0299 HHS/HOSPICE OF RN EA 15 MIN: HCPCS

## 2023-08-02 ENCOUNTER — HOME CARE VISIT (OUTPATIENT)
Dept: HOME HEALTH SERVICES | Facility: CLINIC | Age: 48
End: 2023-08-02
Payer: MEDICARE

## 2023-08-02 VITALS
HEART RATE: 80 BPM | RESPIRATION RATE: 20 BRPM | SYSTOLIC BLOOD PRESSURE: 126 MMHG | TEMPERATURE: 97.7 F | DIASTOLIC BLOOD PRESSURE: 80 MMHG | OXYGEN SATURATION: 99 %

## 2023-08-02 PROCEDURE — G0300 HHS/HOSPICE OF LPN EA 15 MIN: HCPCS

## 2023-08-04 ENCOUNTER — OFFICE VISIT (OUTPATIENT)
Dept: WOUND CARE | Facility: HOSPITAL | Age: 48
End: 2023-08-04
Payer: MEDICARE

## 2023-08-04 ENCOUNTER — OUTSIDE FACILITY SERVICE (OUTPATIENT)
Dept: PODIATRY | Facility: CLINIC | Age: 48
End: 2023-08-04
Payer: MEDICARE

## 2023-08-04 PROCEDURE — 97607 NEG PRS WND THR NDME<=50SQCM: CPT

## 2023-08-09 ENCOUNTER — HOME CARE VISIT (OUTPATIENT)
Dept: HOME HEALTH SERVICES | Facility: CLINIC | Age: 48
End: 2023-08-09
Payer: MEDICARE

## 2023-08-09 VITALS
TEMPERATURE: 97.7 F | SYSTOLIC BLOOD PRESSURE: 124 MMHG | HEART RATE: 62 BPM | OXYGEN SATURATION: 98 % | RESPIRATION RATE: 20 BRPM | DIASTOLIC BLOOD PRESSURE: 68 MMHG

## 2023-08-09 PROCEDURE — G0300 HHS/HOSPICE OF LPN EA 15 MIN: HCPCS

## 2023-08-11 ENCOUNTER — OUTSIDE FACILITY SERVICE (OUTPATIENT)
Dept: PODIATRY | Facility: CLINIC | Age: 48
End: 2023-08-11
Payer: MEDICARE

## 2023-08-11 ENCOUNTER — OFFICE VISIT (OUTPATIENT)
Dept: WOUND CARE | Facility: HOSPITAL | Age: 48
End: 2023-08-11
Payer: MEDICARE

## 2023-08-14 ENCOUNTER — HOME CARE VISIT (OUTPATIENT)
Dept: HOME HEALTH SERVICES | Facility: CLINIC | Age: 48
End: 2023-08-14
Payer: MEDICARE

## 2023-08-14 VITALS
OXYGEN SATURATION: 98 % | SYSTOLIC BLOOD PRESSURE: 126 MMHG | HEART RATE: 77 BPM | DIASTOLIC BLOOD PRESSURE: 60 MMHG | TEMPERATURE: 98.3 F | RESPIRATION RATE: 20 BRPM

## 2023-08-14 PROCEDURE — G0299 HHS/HOSPICE OF RN EA 15 MIN: HCPCS

## 2023-08-18 ENCOUNTER — OFFICE VISIT (OUTPATIENT)
Dept: WOUND CARE | Facility: HOSPITAL | Age: 48
End: 2023-08-18
Payer: MEDICARE

## 2023-08-18 ENCOUNTER — OUTSIDE FACILITY SERVICE (OUTPATIENT)
Dept: PODIATRY | Facility: CLINIC | Age: 48
End: 2023-08-18
Payer: MEDICARE

## 2023-08-22 ENCOUNTER — HOME CARE VISIT (OUTPATIENT)
Dept: HOME HEALTH SERVICES | Facility: CLINIC | Age: 48
End: 2023-08-22
Payer: MEDICARE

## 2023-08-22 VITALS
DIASTOLIC BLOOD PRESSURE: 80 MMHG | HEART RATE: 74 BPM | SYSTOLIC BLOOD PRESSURE: 124 MMHG | OXYGEN SATURATION: 99 % | TEMPERATURE: 97.8 F | RESPIRATION RATE: 20 BRPM

## 2023-08-22 PROCEDURE — G0300 HHS/HOSPICE OF LPN EA 15 MIN: HCPCS

## 2023-08-25 ENCOUNTER — OFFICE VISIT (OUTPATIENT)
Dept: WOUND CARE | Facility: HOSPITAL | Age: 48
End: 2023-08-25
Payer: MEDICARE

## 2023-08-25 ENCOUNTER — OUTSIDE FACILITY SERVICE (OUTPATIENT)
Dept: PODIATRY | Facility: CLINIC | Age: 48
End: 2023-08-25
Payer: MEDICARE

## 2023-08-25 PROCEDURE — 97605 NEG PRS WND THER DME<=50SQCM: CPT

## 2023-08-28 ENCOUNTER — HOME CARE VISIT (OUTPATIENT)
Dept: HOME HEALTH SERVICES | Facility: CLINIC | Age: 48
End: 2023-08-28
Payer: MEDICARE

## 2023-08-28 VITALS
HEART RATE: 77 BPM | RESPIRATION RATE: 20 BRPM | TEMPERATURE: 97.8 F | SYSTOLIC BLOOD PRESSURE: 124 MMHG | DIASTOLIC BLOOD PRESSURE: 70 MMHG | OXYGEN SATURATION: 98 %

## 2023-08-28 PROCEDURE — G0300 HHS/HOSPICE OF LPN EA 15 MIN: HCPCS

## 2023-08-30 ENCOUNTER — HOME CARE VISIT (OUTPATIENT)
Dept: HOME HEALTH SERVICES | Facility: CLINIC | Age: 48
End: 2023-08-30
Payer: MEDICARE

## 2023-08-30 VITALS
DIASTOLIC BLOOD PRESSURE: 78 MMHG | HEART RATE: 78 BPM | TEMPERATURE: 98.2 F | SYSTOLIC BLOOD PRESSURE: 138 MMHG | RESPIRATION RATE: 20 BRPM | OXYGEN SATURATION: 98 %

## 2023-08-30 PROCEDURE — G0299 HHS/HOSPICE OF RN EA 15 MIN: HCPCS

## 2023-09-01 ENCOUNTER — OFFICE VISIT (OUTPATIENT)
Dept: WOUND CARE | Facility: HOSPITAL | Age: 48
End: 2023-09-01
Payer: MEDICARE

## 2023-09-01 ENCOUNTER — OUTSIDE FACILITY SERVICE (OUTPATIENT)
Dept: PODIATRY | Facility: CLINIC | Age: 48
End: 2023-09-01
Payer: MEDICARE

## 2023-09-01 PROCEDURE — 97605 NEG PRS WND THER DME<=50SQCM: CPT

## 2023-09-03 ENCOUNTER — HOME CARE VISIT (OUTPATIENT)
Dept: HOME HEALTH SERVICES | Facility: CLINIC | Age: 48
End: 2023-09-03
Payer: MEDICARE

## 2023-09-03 VITALS
DIASTOLIC BLOOD PRESSURE: 68 MMHG | HEART RATE: 79 BPM | OXYGEN SATURATION: 98 % | TEMPERATURE: 98.3 F | SYSTOLIC BLOOD PRESSURE: 124 MMHG | RESPIRATION RATE: 20 BRPM

## 2023-09-03 PROCEDURE — G0299 HHS/HOSPICE OF RN EA 15 MIN: HCPCS

## 2023-09-05 ENCOUNTER — HOME CARE VISIT (OUTPATIENT)
Dept: HOME HEALTH SERVICES | Facility: CLINIC | Age: 48
End: 2023-09-05
Payer: MEDICARE

## 2023-09-05 PROCEDURE — G0299 HHS/HOSPICE OF RN EA 15 MIN: HCPCS

## 2023-09-08 ENCOUNTER — OFFICE VISIT (OUTPATIENT)
Dept: WOUND CARE | Facility: HOSPITAL | Age: 48
End: 2023-09-08
Payer: MEDICARE

## 2023-09-08 ENCOUNTER — OUTSIDE FACILITY SERVICE (OUTPATIENT)
Dept: PODIATRY | Facility: CLINIC | Age: 48
End: 2023-09-08
Payer: MEDICARE

## 2023-09-12 VITALS
DIASTOLIC BLOOD PRESSURE: 88 MMHG | RESPIRATION RATE: 22 BRPM | SYSTOLIC BLOOD PRESSURE: 134 MMHG | OXYGEN SATURATION: 97 % | TEMPERATURE: 98 F

## 2023-09-13 ENCOUNTER — OFFICE VISIT (OUTPATIENT)
Dept: WOUND CARE | Facility: HOSPITAL | Age: 48
End: 2023-09-13
Payer: MEDICARE

## 2023-09-13 ENCOUNTER — OUTSIDE FACILITY SERVICE (OUTPATIENT)
Dept: WOUND CARE | Facility: HOSPITAL | Age: 48
End: 2023-09-13
Payer: MEDICARE

## 2023-09-13 PROCEDURE — 97607 NEG PRS WND THR NDME<=50SQCM: CPT

## 2023-09-13 PROCEDURE — 11042 DBRDMT SUBQ TIS 1ST 20SQCM/<: CPT | Performed by: NURSE PRACTITIONER

## 2023-09-16 DIAGNOSIS — M79.89 LEG SWELLING: ICD-10-CM

## 2023-09-18 RX ORDER — FUROSEMIDE 40 MG/1
40 TABLET ORAL DAILY
Qty: 90 TABLET | Refills: 0 | Status: SHIPPED | OUTPATIENT
Start: 2023-09-18

## 2023-09-20 ENCOUNTER — OFFICE VISIT (OUTPATIENT)
Dept: WOUND CARE | Facility: HOSPITAL | Age: 48
End: 2023-09-20
Payer: MEDICARE

## 2023-09-20 ENCOUNTER — OUTSIDE FACILITY SERVICE (OUTPATIENT)
Dept: WOUND CARE | Facility: HOSPITAL | Age: 48
End: 2023-09-20

## 2023-09-20 PROCEDURE — 97607 NEG PRS WND THR NDME<=50SQCM: CPT

## 2023-09-20 PROCEDURE — 11042 DBRDMT SUBQ TIS 1ST 20SQCM/<: CPT | Performed by: NURSE PRACTITIONER

## 2023-09-25 ENCOUNTER — OFFICE VISIT (OUTPATIENT)
Dept: WOUND CARE | Facility: HOSPITAL | Age: 48
End: 2023-09-25

## 2023-09-25 ENCOUNTER — OUTSIDE FACILITY SERVICE (OUTPATIENT)
Dept: WOUND CARE | Facility: HOSPITAL | Age: 48
End: 2023-09-25
Payer: MEDICARE

## 2023-09-25 PROCEDURE — 11042 DBRDMT SUBQ TIS 1ST 20SQCM/<: CPT | Performed by: NURSE PRACTITIONER

## 2023-09-25 PROCEDURE — 97607 NEG PRS WND THR NDME<=50SQCM: CPT

## 2023-09-26 ENCOUNTER — HOME CARE VISIT (OUTPATIENT)
Dept: HOME HEALTH SERVICES | Facility: HOME HEALTHCARE | Age: 48
End: 2023-09-26
Payer: MEDICARE

## 2023-09-28 ENCOUNTER — HOME CARE VISIT (OUTPATIENT)
Dept: HOME HEALTH SERVICES | Facility: CLINIC | Age: 48
End: 2023-09-28
Payer: MEDICARE

## 2023-09-28 VITALS
SYSTOLIC BLOOD PRESSURE: 124 MMHG | RESPIRATION RATE: 20 BRPM | OXYGEN SATURATION: 97 % | DIASTOLIC BLOOD PRESSURE: 70 MMHG | TEMPERATURE: 98.2 F | HEART RATE: 75 BPM

## 2023-09-28 PROCEDURE — G0299 HHS/HOSPICE OF RN EA 15 MIN: HCPCS
